# Patient Record
Sex: MALE | Race: WHITE | NOT HISPANIC OR LATINO | Employment: OTHER | URBAN - METROPOLITAN AREA
[De-identification: names, ages, dates, MRNs, and addresses within clinical notes are randomized per-mention and may not be internally consistent; named-entity substitution may affect disease eponyms.]

---

## 2019-04-05 ENCOUNTER — HOSPITAL ENCOUNTER (OUTPATIENT)
Dept: OTHER | Facility: HOSPITAL | Age: 74
Discharge: HOME | End: 2019-04-05
Attending: FAMILY MEDICINE | Admitting: FAMILY MEDICINE

## 2019-09-11 ENCOUNTER — OFFICE VISIT (OUTPATIENT)
Dept: FAMILY MEDICINE CLINIC | Facility: CLINIC | Age: 74
End: 2019-09-11

## 2019-09-11 VITALS
TEMPERATURE: 97.6 F | OXYGEN SATURATION: 99 % | BODY MASS INDEX: 27.8 KG/M2 | DIASTOLIC BLOOD PRESSURE: 86 MMHG | RESPIRATION RATE: 18 BRPM | HEART RATE: 55 BPM | SYSTOLIC BLOOD PRESSURE: 140 MMHG | WEIGHT: 183.4 LBS | HEIGHT: 68 IN

## 2019-09-11 DIAGNOSIS — S80.811A INFECTED ABRASION OF RIGHT LOWER EXTREMITY, INITIAL ENCOUNTER: Primary | ICD-10-CM

## 2019-09-11 DIAGNOSIS — L08.9 INFECTED ABRASION OF RIGHT LOWER EXTREMITY, INITIAL ENCOUNTER: Primary | ICD-10-CM

## 2019-09-11 PROCEDURE — 99213 OFFICE O/P EST LOW 20 MIN: CPT | Performed by: FAMILY MEDICINE

## 2019-09-11 RX ORDER — LORATADINE 10 MG/1
1 CAPSULE, LIQUID FILLED ORAL DAILY
COMMUNITY

## 2019-09-11 RX ORDER — METOPROLOL SUCCINATE 100 MG/1
TABLET, EXTENDED RELEASE ORAL DAILY
COMMUNITY
Start: 2019-05-29 | End: 2020-01-24 | Stop reason: SDUPTHER

## 2019-09-11 RX ORDER — METOPROLOL SUCCINATE 100 MG/1
TABLET, EXTENDED RELEASE ORAL
COMMUNITY
Start: 2019-08-21 | End: 2019-10-31 | Stop reason: SDUPTHER

## 2019-09-11 RX ORDER — MULTIVITAMIN WITH IRON
1 TABLET ORAL DAILY
COMMUNITY

## 2019-09-11 RX ORDER — PROCHLORPERAZINE MALEATE 10 MG
TABLET ORAL
Refills: 0 | Status: ON HOLD | COMMUNITY
Start: 2019-06-07 | End: 2021-10-01

## 2019-09-11 RX ORDER — LOSARTAN POTASSIUM AND HYDROCHLOROTHIAZIDE 12.5; 1 MG/1; MG/1
TABLET ORAL
COMMUNITY
Start: 2019-08-21 | End: 2020-03-26

## 2019-09-11 RX ORDER — ATORVASTATIN CALCIUM 20 MG/1
TABLET, FILM COATED ORAL DAILY
COMMUNITY
Start: 2019-05-29 | End: 2020-06-29

## 2019-09-11 RX ORDER — RANITIDINE 300 MG/1
TABLET ORAL DAILY
COMMUNITY
Start: 2019-05-29 | End: 2020-10-09

## 2019-09-11 RX ORDER — TERBINAFINE HYDROCHLORIDE 250 MG/1
250 TABLET ORAL DAILY
Refills: 1 | Status: ON HOLD | COMMUNITY
Start: 2019-09-03 | End: 2021-10-01

## 2019-09-11 RX ORDER — OMEPRAZOLE 20 MG/1
1 CAPSULE, DELAYED RELEASE ORAL DAILY
COMMUNITY
Start: 2019-05-29 | End: 2020-06-25

## 2019-09-11 NOTE — PROGRESS NOTES
Chief Complaint   Patient presents with   • Laceration     right shin       Subjective   Luis Emery is a 74 y.o. male.     Laceration    The incident occurred 5 to 7 days ago. The laceration is located on the right leg. The laceration is 3 cm in size. The laceration mechanism was a blunt object (Patient was wearing jeans when the object hit the leg. There was no damage to the jeans.). The pain is at a severity of 5/10. The pain is moderate. The pain has been fluctuating since onset. His tetanus status is UTD.          I have reviewed and updated his medications, medical history and problem list during today's office visit.     Past Medical History:   Diagnosis Date   • Anemia    • Chronic angle-closure glaucoma    • DNR (do not resuscitate)    • Essential hypertension    • Gastroesophageal reflux disease without esophagitis    • History of measles    • Hyperlipidemia    • Medicare annual wellness visit, subsequent    • Onychomycosis    • Overweight    • Personal history of malignant neoplasm of prostate    • Screening for depression     Negative Depression Screening (4 or less) ()   • Screening PSA (prostate specific antigen)        Past Surgical History:   Procedure Laterality Date   • ANKLE TENDON REPAIR  July 5th, 2017    Dr. Armijo   • CHOLECYSTECTOMY  03/2010    [facility].   • PROSTATECTOMY  01/1997    [facility].       Family History   Problem Relation Age of Onset   • Testicular cancer Father    • Coronary artery disease Father    • Hypertension Other        Current Outpatient Medications on File Prior to Visit   Medication Sig Dispense Refill   • atorvastatin (LIPITOR) 20 MG tablet Take  by mouth Daily.     • Loratadine (CLARITIN) 10 MG capsule Take 1 capsule by mouth Daily.     • losartan-hydrochlorothiazide (HYZAAR) 100-12.5 MG per tablet      • methylcellulose, Laxative, (CITRUCEL) 500 MG tablet tablet Take  by mouth Daily.     • metoprolol succinate XL (TOPROL-XL)  "100 MG 24 hr tablet Take  by mouth Daily.     • metoprolol succinate XL (TOPROL-XL) 100 MG 24 hr tablet      • Multiple Vitamin (MULTIVITAMINS PO) Take 1 capsule by mouth Daily.     • Omega-3 Fatty Acids (FISH OIL CONCENTRATE) 300 MG capsule Take 1 capsule by mouth Daily.     • omeprazole (priLOSEC) 20 MG capsule Take 1 capsule by mouth Daily.     • prochlorperazine (COMPAZINE) 10 MG tablet TAKE 1 2 TO 1 (ONE HALF TO ONE) TABLET BY MOUTH EVERY 4 TO 6 HOURS AS NEEDED FOR NAUSEA VOMITING  0   • raNITIdine (ZANTAC) 300 MG tablet Take  by mouth Daily.     • terbinafine (lamiSIL) 250 MG tablet Take 250 mg by mouth Daily.  1   • glucosamine-chondroitin 500-400 MG per tablet Take  by mouth Daily.       No current facility-administered medications on file prior to visit.        PHQ-2 Depression Screening  Little interest or pleasure in doing things? 0   Feeling down, depressed, or hopeless? 0   PHQ-2 Total Score 0         Social History     Tobacco Use   • Smoking status: Never Smoker   Substance Use Topics   • Alcohol use: No       Review of Systems   Constitutional: Negative for chills and fever.   Respiratory: Negative for shortness of breath.    Cardiovascular: Negative for chest pain and leg swelling.   Gastrointestinal: Negative for abdominal pain.   Genitourinary: Negative for difficulty urinating.   Musculoskeletal: Negative for arthralgias, gait problem and joint swelling.   Skin: Positive for skin lesions.   Neurological: Negative for weakness and numbness.       Objective   Vitals:    09/11/19 1733   BP: 140/86   BP Location: Left arm   Patient Position: Sitting   Cuff Size: Adult   Pulse: 55   Resp: 18   Temp: 97.6 °F (36.4 °C)   TempSrc: Oral   SpO2: 99%   Weight: 83.2 kg (183 lb 6.4 oz)   Height: 172.7 cm (68\")     Body mass index is 27.89 kg/m².  Physical Exam   Constitutional: He is oriented to person, place, and time. He appears well-developed and well-nourished. No distress.   HENT:   Head: Normocephalic " and atraumatic.   Mouth/Throat: Oropharynx is clear and moist.   Eyes: Conjunctivae and EOM are normal. Pupils are equal, round, and reactive to light.   Neck: Normal range of motion. Neck supple. No JVD present.   Cardiovascular: Normal rate, regular rhythm and normal heart sounds.   No murmur heard.  Pulmonary/Chest: Effort normal and breath sounds normal.   Musculoskeletal: Normal range of motion. He exhibits tenderness. He exhibits no edema.   Lymphadenopathy:     He has no cervical adenopathy.   Neurological: He is alert and oriented to person, place, and time. No cranial nerve deficit.   Skin: Skin is warm and dry. Capillary refill takes less than 2 seconds. No rash noted.        Psychiatric: He has a normal mood and affect. His behavior is normal. Thought content normal.       Assessment/Plan       Diagnoses and all orders for this visit:    1. Infected abrasion of right lower extremity, initial encounter (Primary)  -     silver sulfadiazine (SILVADENE, SSD) 1 % cream; Apply  topically to the appropriate area as directed 2 (Two) Times a Day.  Dispense: 50 g; Refill: 1      Wound care discussed.   Apply silvadene.  F/U next week if needed for recheck.    Return if symptoms worsen or fail to improve.

## 2019-09-12 PROBLEM — Z12.5 SCREENING PSA (PROSTATE SPECIFIC ANTIGEN): Status: ACTIVE | Noted: 2019-09-12

## 2019-09-12 PROBLEM — K21.9 GASTROESOPHAGEAL REFLUX DISEASE WITHOUT ESOPHAGITIS: Status: ACTIVE | Noted: 2019-09-12

## 2019-09-12 PROBLEM — D64.9 ANEMIA: Status: ACTIVE | Noted: 2019-09-12

## 2019-09-12 PROBLEM — R35.0 INCREASED FREQUENCY OF URINATION: Status: ACTIVE | Noted: 2019-09-12

## 2019-09-12 PROBLEM — B35.1 ONYCHOMYCOSIS: Status: ACTIVE | Noted: 2019-09-12

## 2019-09-12 PROBLEM — H40.2290 CHRONIC ANGLE-CLOSURE GLAUCOMA: Status: ACTIVE | Noted: 2019-09-12

## 2019-09-12 PROBLEM — Z86.19 HISTORY OF MEASLES: Status: ACTIVE | Noted: 2019-09-12

## 2019-09-12 PROBLEM — Z00.00 MEDICARE ANNUAL WELLNESS VISIT, SUBSEQUENT: Status: ACTIVE | Noted: 2019-09-12

## 2019-09-12 PROBLEM — Z23 INFLUENZA VACCINE ADMINISTERED: Status: ACTIVE | Noted: 2019-09-12

## 2019-09-12 PROBLEM — I10 ESSENTIAL HYPERTENSION: Status: ACTIVE | Noted: 2019-09-12

## 2019-09-12 PROBLEM — Z66 DNR (DO NOT RESUSCITATE): Status: ACTIVE | Noted: 2019-09-12

## 2019-09-12 PROBLEM — Z85.46 PERSONAL HISTORY OF PROSTATE CANCER: Status: ACTIVE | Noted: 2019-09-12

## 2019-09-12 PROBLEM — E78.5 HYPERLIPIDEMIA: Status: ACTIVE | Noted: 2019-09-12

## 2019-09-12 RX ORDER — LANOLIN ALCOHOL/MO/W.PET/CERES
1 CREAM (GRAM) TOPICAL DAILY
COMMUNITY

## 2019-09-18 ENCOUNTER — OFFICE VISIT (OUTPATIENT)
Dept: FAMILY MEDICINE CLINIC | Facility: CLINIC | Age: 74
End: 2019-09-18

## 2019-09-18 VITALS
TEMPERATURE: 98.8 F | WEIGHT: 183 LBS | DIASTOLIC BLOOD PRESSURE: 88 MMHG | HEIGHT: 68 IN | BODY MASS INDEX: 27.74 KG/M2 | OXYGEN SATURATION: 96 % | SYSTOLIC BLOOD PRESSURE: 136 MMHG | HEART RATE: 57 BPM

## 2019-09-18 DIAGNOSIS — S80.811D ABRASION OF RIGHT LOWER EXTREMITY, SUBSEQUENT ENCOUNTER: Primary | ICD-10-CM

## 2019-09-18 DIAGNOSIS — L03.115 CELLULITIS OF RIGHT LOWER EXTREMITY: ICD-10-CM

## 2019-09-18 DIAGNOSIS — I10 ESSENTIAL HYPERTENSION: ICD-10-CM

## 2019-09-18 PROCEDURE — 99213 OFFICE O/P EST LOW 20 MIN: CPT | Performed by: FAMILY MEDICINE

## 2019-09-18 PROCEDURE — 96372 THER/PROPH/DIAG INJ SC/IM: CPT | Performed by: FAMILY MEDICINE

## 2019-09-18 RX ORDER — SULFAMETHOXAZOLE AND TRIMETHOPRIM 800; 160 MG/1; MG/1
1 TABLET ORAL 2 TIMES DAILY
Qty: 20 TABLET | Refills: 0 | Status: SHIPPED | OUTPATIENT
Start: 2019-09-18 | End: 2019-09-28

## 2019-09-18 RX ORDER — CEFTRIAXONE 1 G/1
1 INJECTION, POWDER, FOR SOLUTION INTRAMUSCULAR; INTRAVENOUS EVERY 24 HOURS
Status: COMPLETED | OUTPATIENT
Start: 2019-09-18 | End: 2019-09-18

## 2019-09-18 RX ADMIN — CEFTRIAXONE 1 G: 1 INJECTION, POWDER, FOR SOLUTION INTRAMUSCULAR; INTRAVENOUS at 17:02

## 2019-09-18 NOTE — PROGRESS NOTES
"Chief Complaint   Patient presents with   • Leg Swelling       Subjective   Luis Emery is a 74 y.o. male.     Patient reports that abrasion on right leg is not getting better and it hurts on the inside.       Leg Swelling   This is a recurrent problem. The current episode started 1 to 4 weeks ago. The problem occurs constantly. The problem has been unchanged. Pertinent negatives include no abdominal pain, arthralgias, chest pain, chills, fever, joint swelling, numbness or weakness. The symptoms are aggravated by walking and standing. Treatments tried: silvadine. The treatment provided no relief.          I have reviewed and updated his medications, medical history and problem list during today's office visit.     Social History     Tobacco Use   • Smoking status: Never Smoker   Substance Use Topics   • Alcohol use: No       Review of Systems   Constitutional: Negative for chills and fever.   Respiratory: Negative for shortness of breath.    Cardiovascular: Negative for chest pain and leg swelling.   Gastrointestinal: Negative for abdominal pain.   Genitourinary: Negative for difficulty urinating.   Musculoskeletal: Negative for arthralgias, gait problem and joint swelling.   Skin: Positive for skin lesions.   Neurological: Negative for weakness and numbness.       Objective   Vitals:    09/18/19 1623   BP: 136/88   Pulse: 57   Temp: 98.8 °F (37.1 °C)   TempSrc: Oral   SpO2: 96%   Weight: 83 kg (183 lb)   Height: 172.7 cm (68\")     Body mass index is 27.83 kg/m².  Physical Exam   Constitutional: He is oriented to person, place, and time. He appears well-developed and well-nourished. No distress.   HENT:   Head: Normocephalic and atraumatic.   Mouth/Throat: Oropharynx is clear and moist.   Eyes: Conjunctivae are normal.   Cardiovascular: Normal rate, regular rhythm and normal heart sounds.   No murmur heard.  Pulmonary/Chest: Effort normal and breath sounds normal.   Musculoskeletal: He exhibits no edema. "   Neurological: He is alert and oriented to person, place, and time.   Skin: Skin is warm. Capillary refill takes less than 2 seconds. Turgor is normal. Abrasion noted. No bruising and no ecchymosis noted. There is erythema.        Psychiatric: He has a normal mood and affect.       Assessment/Plan       Diagnoses and all orders for this visit:    1. Abrasion of right lower extremity, subsequent encounter (Primary)    2. Cellulitis of right lower extremity  -     cefTRIAXone (ROCEPHIN) injection 1 g  -     sulfamethoxazole-trimethoprim (BACTRIM DS,SEPTRA DS) 800-160 MG per tablet; Take 1 tablet by mouth 2 (Two) Times a Day for 10 days.  Dispense: 20 tablet; Refill: 0  -     mupirocin (BACTROBAN) 2 % ointment; Apply  topically to the appropriate area as directed 3 (Three) Times a Day.  Dispense: 22 g; Refill: 0    3. Essential hypertension  Comments:  Monitor blood pressure.          Return if symptoms worsen or fail to improve.

## 2019-10-18 ENCOUNTER — OFFICE VISIT (OUTPATIENT)
Dept: FAMILY MEDICINE CLINIC | Facility: CLINIC | Age: 74
End: 2019-10-18

## 2019-10-18 VITALS
BODY MASS INDEX: 28.01 KG/M2 | RESPIRATION RATE: 18 BRPM | HEIGHT: 68 IN | OXYGEN SATURATION: 99 % | WEIGHT: 184.8 LBS | DIASTOLIC BLOOD PRESSURE: 74 MMHG | SYSTOLIC BLOOD PRESSURE: 116 MMHG | TEMPERATURE: 97.1 F | HEART RATE: 56 BPM

## 2019-10-18 DIAGNOSIS — S80.811D ABRASION OF RIGHT LOWER EXTREMITY, SUBSEQUENT ENCOUNTER: Primary | ICD-10-CM

## 2019-10-18 PROCEDURE — 99212 OFFICE O/P EST SF 10 MIN: CPT | Performed by: FAMILY MEDICINE

## 2019-10-18 RX ORDER — INFLUENZA A VIRUS A/MICHIGAN/45/2015 X-275 (H1N1) ANTIGEN (FORMALDEHYDE INACTIVATED), INFLUENZA A VIRUS A/SINGAPORE/INFIMH-16-0019/2016 IVR-186 (H3N2) ANTIGEN (FORMALDEHYDE INACTIVATED), AND INFLUENZA B VIRUS B/MARYLAND/15/2016 BX-69A (A B/COLORADO/6/2017-LIKE VIRUS) ANTIGEN (FORMALDEHYDE INACTIVATED) 60; 60; 60 UG/.5ML; UG/.5ML; UG/.5ML
INJECTION, SUSPENSION INTRAMUSCULAR
Refills: 0 | COMMUNITY
Start: 2019-10-10 | End: 2019-10-31

## 2019-10-18 NOTE — PROGRESS NOTES
Chief Complaint   Patient presents with   • Follow-up     Abrasion of right lower extremity       Subjective   Luis Emery is a 74 y.o. male.     Abrasion   The current episode started more than 1 month ago. The problem occurs constantly. The problem has been gradually improving. Pertinent negatives include no abdominal pain, arthralgias, chest pain, chills, fever, joint swelling, numbness or weakness. Treatments tried: antibiotics, topical tripple anitbiotics, Derm banadage treatment put on yesterday by Dr. Bernstein. The treatment provided mild relief.        I have reviewed and updated his medications, medical history and problem list during today's office visit.     Active Ambulatory Problems     Diagnosis Date Noted   • Personal history of prostate cancer 09/12/2019   • Onychomycosis 09/12/2019   • Influenza vaccine administered 09/12/2019   • Increased frequency of urination 09/12/2019   • Hyperlipidemia 09/12/2019   • History of measles 09/12/2019   • Gastroesophageal reflux disease without esophagitis 09/12/2019   • Essential hypertension 09/12/2019   • Screening PSA (prostate specific antigen) 09/12/2019   • Medicare annual wellness visit, subsequent 09/12/2019   • DNR (do not resuscitate) 09/12/2019   • Chronic angle-closure glaucoma 09/12/2019   • Anemia 09/12/2019     Resolved Ambulatory Problems     Diagnosis Date Noted   • No Resolved Ambulatory Problems     Past Medical History:   Diagnosis Date   • Abdominal pain    • Acute recurrent frontal sinusitis    • Acute upper respiratory infection    • Anemia    • Chronic angle-closure glaucoma    • DNR (do not resuscitate)    • Essential hypertension    • Gastroesophageal reflux disease without esophagitis    • History of measles    • Hyperlipidemia    • Medicare annual wellness visit, subsequent    • Onychomycosis    • Overweight    • Personal history of malignant neoplasm of prostate    • Screening for depression    • Screening PSA (prostate specific  antigen)    • Urinary frequency      Current Outpatient Medications on File Prior to Visit   Medication Sig Dispense Refill   • atorvastatin (LIPITOR) 20 MG tablet Take  by mouth Daily.     • glucosamine-chondroitin 500-400 MG per tablet Take  by mouth Daily.     • Loratadine (CLARITIN) 10 MG capsule Take 1 capsule by mouth Daily.     • losartan-hydrochlorothiazide (HYZAAR) 100-12.5 MG per tablet      • methylcellulose, Laxative, (CITRUCEL) 500 MG tablet tablet Take  by mouth Daily.     • metoprolol succinate XL (TOPROL-XL) 100 MG 24 hr tablet Take  by mouth Daily.     • metoprolol succinate XL (TOPROL-XL) 100 MG 24 hr tablet      • Multiple Vitamin (MULTIVITAMINS PO) Take 1 capsule by mouth Daily.     • mupirocin (BACTROBAN) 2 % ointment Apply  topically to the appropriate area as directed 3 (Three) Times a Day. 22 g 0   • Omega-3 Fatty Acids (FISH OIL CONCENTRATE) 300 MG capsule Take 1 capsule by mouth Daily.     • omeprazole (priLOSEC) 20 MG capsule Take 1 capsule by mouth Daily.     • prochlorperazine (COMPAZINE) 10 MG tablet TAKE 1 2 TO 1 (ONE HALF TO ONE) TABLET BY MOUTH EVERY 4 TO 6 HOURS AS NEEDED FOR NAUSEA VOMITING  0   • raNITIdine (ZANTAC) 300 MG tablet Take  by mouth Daily.     • terbinafine (lamiSIL) 250 MG tablet Take 250 mg by mouth Daily.  1   • FLUZONE HIGH-DOSE 0.5 ML suspension prefilled syringe injection TO BE ADMINISTERED BY PHARMACIST FOR IMMUNIZATION  0     No current facility-administered medications on file prior to visit.          Social History     Tobacco Use   • Smoking status: Never Smoker   Substance Use Topics   • Alcohol use: No       Review of Systems   Constitutional: Negative for chills and fever.   Respiratory: Negative for shortness of breath.    Cardiovascular: Negative for chest pain and leg swelling.   Gastrointestinal: Negative for abdominal pain.   Genitourinary: Negative for difficulty urinating.   Musculoskeletal: Negative for arthralgias, gait problem and joint  "swelling.   Skin: Positive for skin lesions.   Neurological: Negative for weakness and numbness.       Objective   Vitals:    10/18/19 1319   BP: 116/74   BP Location: Left arm   Patient Position: Sitting   Cuff Size: Adult   Pulse: 56   Resp: 18   Temp: 97.1 °F (36.2 °C)   TempSrc: Oral   SpO2: 99%   Weight: 83.8 kg (184 lb 12.8 oz)   Height: 172.7 cm (68\")     Body mass index is 28.1 kg/m².  Physical Exam   Constitutional: He is oriented to person, place, and time. He appears well-developed and well-nourished. No distress.   HENT:   Head: Normocephalic and atraumatic.   Mouth/Throat: Oropharynx is clear and moist.   Eyes: Conjunctivae are normal.   Cardiovascular: Normal rate, regular rhythm and normal heart sounds.   No murmur heard.  Pulmonary/Chest: Effort normal and breath sounds normal.   Musculoskeletal: He exhibits no edema.   Neurological: He is alert and oriented to person, place, and time.   Skin: Skin is warm. Capillary refill takes less than 2 seconds. Turgor is normal. Abrasion noted. No bruising and no ecchymosis noted. There is erythema.        Psychiatric: He has a normal mood and affect.           Assessment/Plan       Diagnoses and all orders for this visit:    1. Abrasion of right lower extremity, subsequent encounter (Primary)  Comments:  Now being managed by Dr. Bernstein with a Duoderm, placed yesterday.  They will monitor site and f/u as needed.      Return if symptoms worsen or fail to improve.        "

## 2019-10-31 ENCOUNTER — OFFICE VISIT (OUTPATIENT)
Dept: FAMILY MEDICINE CLINIC | Facility: CLINIC | Age: 74
End: 2019-10-31

## 2019-10-31 VITALS
DIASTOLIC BLOOD PRESSURE: 84 MMHG | WEIGHT: 182.4 LBS | TEMPERATURE: 96.2 F | SYSTOLIC BLOOD PRESSURE: 128 MMHG | BODY MASS INDEX: 27.65 KG/M2 | HEART RATE: 53 BPM | RESPIRATION RATE: 18 BRPM | OXYGEN SATURATION: 99 % | HEIGHT: 68 IN

## 2019-10-31 DIAGNOSIS — E78.5 HYPERLIPIDEMIA, UNSPECIFIED HYPERLIPIDEMIA TYPE: ICD-10-CM

## 2019-10-31 DIAGNOSIS — Z00.00 ENCOUNTER FOR MEDICARE ANNUAL WELLNESS EXAM: Primary | ICD-10-CM

## 2019-10-31 DIAGNOSIS — Z66 DNR (DO NOT RESUSCITATE): ICD-10-CM

## 2019-10-31 DIAGNOSIS — I10 ESSENTIAL HYPERTENSION: ICD-10-CM

## 2019-10-31 DIAGNOSIS — S80.811D ABRASION OF RIGHT LOWER EXTREMITY, SUBSEQUENT ENCOUNTER: ICD-10-CM

## 2019-10-31 LAB
BILIRUB BLD-MCNC: NEGATIVE MG/DL
CLARITY, POC: CLEAR
COLOR UR: YELLOW
GLUCOSE UR STRIP-MCNC: NEGATIVE MG/DL
KETONES UR QL: NEGATIVE
LEUKOCYTE EST, POC: NEGATIVE
NITRITE UR-MCNC: NEGATIVE MG/ML
PH UR: 6 [PH] (ref 5–8)
PROT UR STRIP-MCNC: NEGATIVE MG/DL
RBC # UR STRIP: NEGATIVE /UL
SP GR UR: 1 (ref 1–1.03)
UROBILINOGEN UR QL: NORMAL

## 2019-10-31 PROCEDURE — G0439 PPPS, SUBSEQ VISIT: HCPCS | Performed by: FAMILY MEDICINE

## 2019-10-31 PROCEDURE — 36415 COLL VENOUS BLD VENIPUNCTURE: CPT | Performed by: FAMILY MEDICINE

## 2019-10-31 PROCEDURE — 81002 URINALYSIS NONAUTO W/O SCOPE: CPT | Performed by: FAMILY MEDICINE

## 2019-10-31 PROCEDURE — 99214 OFFICE O/P EST MOD 30 MIN: CPT | Performed by: FAMILY MEDICINE

## 2019-10-31 RX ORDER — SULFAMETHOXAZOLE AND TRIMETHOPRIM 800; 160 MG/1; MG/1
1 TABLET ORAL 2 TIMES DAILY
Qty: 20 TABLET | Refills: 0 | Status: SHIPPED | OUTPATIENT
Start: 2019-10-31 | End: 2019-11-10

## 2019-10-31 NOTE — PROGRESS NOTES
The ABCs of the Annual Wellness Visit  Subsequent Medicare Wellness Visit    Chief Complaint   Patient presents with   • Annual Exam     Medicare subsequent wellness   • Wound Check   • Hypertension   • Hyperlipidemia       Subjective   History of Present Illness:  Luis Emery is a 74 y.o. male who presents for a Subsequent Medicare Wellness Visit.    Hypertension   This is a chronic problem. The current episode started more than 1 year ago. The problem is controlled. Pertinent negatives include no chest pain, headaches, palpitations or shortness of breath. Current antihypertension treatment includes angiotensin blockers, diuretics and beta blockers. There are no compliance problems.  There is no history of angina.   Hyperlipidemia   This is a chronic problem. The current episode started more than 1 year ago. The problem is controlled. Pertinent negatives include no chest pain, myalgias or shortness of breath. Current antihyperlipidemic treatment includes statins and diet change. There are no compliance problems.  Risk factors for coronary artery disease include dyslipidemia, hypertension and male sex.     Wound Check   Patient reports that abrasion on right leg is not healing and getting worse.  Treatments have included wound care, silvadene, a rocephin shot, mupirocin, bactrim DS and a duoderm (ordered by dermatology).  Most recently, dermatology has recommended a diluted vinegar water solution.  Wound has been present for about 2 months.  Pertinent negatives include no abdominal pain, arthralgias, chest pain, chills, fever, joint swelling, numbness or weakness. The symptoms are aggravated by walking and standing.       HEALTH RISK ASSESSMENT    Recent Hospitalizations:  Recently treated at the following:  Other: [place], [place]    Current Medical Providers:  Patient Care Team:  Selena White MD as PCP - General  Luz Bernstein MD as PCP - Claims Attributed  Cindy  Selena Saldana MD as PCP - Family Medicine    Smoking Status:  Social History     Tobacco Use   Smoking Status Never Smoker       Alcohol Consumption:  Social History     Substance and Sexual Activity   Alcohol Use No       Depression Screen:   PHQ-2/PHQ-9 Depression Screening 10/31/2019   Little interest or pleasure in doing things 0   Feeling down, depressed, or hopeless 0   Total Score 0       Fall Risk Screen:  SHAYLA Fall Risk Assessment was completed, and patient is at LOW risk for falls.Assessment completed on:10/31/2019    Health Habits and Functional and Cognitive Screening:  Functional & Cognitive Status 10/31/2019   Do you have difficulty preparing food and eating? No   Do you have difficulty bathing yourself, getting dressed or grooming yourself? No   Do you have difficulty using the toilet? No   Do you have difficulty moving around from place to place? No   Do you have trouble with steps or getting out of a bed or a chair? No   Current Diet Well Balanced Diet   Dental Exam Up to date   Eye Exam Up to date   Exercise (times per week) 2 times per week   Current Exercise Activities Include Stationary Bicycling/Spin Class   Do you need help using the phone?  No   Are you deaf or do you have serious difficulty hearing?  No   Do you need help with transportation? No   Do you need help shopping? No   Do you need help preparing meals?  No   Do you need help with housework?  No   Do you need help with laundry? No   Do you need help taking your medications? No   Do you need help managing money? No   Do you ever drive or ride in a car without wearing a seat belt? No   Have you felt unusual stress, anger or loneliness in the last month? No   Who do you live with? Spouse   If you need help, do you have trouble finding someone available to you? No   Have you been bothered in the last four weeks by sexual problems? No   Do you have difficulty concentrating, remembering or making decisions? No         Does the  patient have evidence of cognitive impairment? No  Score 30/30    Asprin use counseling:Does not need ASA (and currently is not on it)    Age-appropriate Screening Schedule:  Refer to the list below for future screening recommendations based on patient's age, sex and/or medical conditions. Orders for these recommended tests are listed in the plan section. The patient has been provided with a written plan.    Health Maintenance   Topic Date Due   • ZOSTER VACCINE (1 of 2) 07/07/1993   • LIPID PANEL  09/13/2019   • TDAP/TD VACCINES (2 - Td) 04/18/2028   • INFLUENZA VACCINE  Completed   • PNEUMOCOCCAL VACCINES (65+ LOW/MEDIUM RISK)  Completed          The following portions of the patient's history were reviewed and updated as appropriate: allergies, current medications, past family history, past medical history, past social history, past surgical history and problem list.    Outpatient Medications Prior to Visit   Medication Sig Dispense Refill   • atorvastatin (LIPITOR) 20 MG tablet Take  by mouth Daily.     • glucosamine-chondroitin 500-400 MG per tablet Take  by mouth Daily.     • Loratadine (CLARITIN) 10 MG capsule Take 1 capsule by mouth Daily.     • losartan-hydrochlorothiazide (HYZAAR) 100-12.5 MG per tablet      • methylcellulose, Laxative, (CITRUCEL) 500 MG tablet tablet Take  by mouth Daily.     • metoprolol succinate XL (TOPROL-XL) 100 MG 24 hr tablet Take  by mouth Daily.     • Multiple Vitamin (MULTIVITAMINS PO) Take 1 capsule by mouth Daily.     • mupirocin (BACTROBAN) 2 % ointment Apply  topically to the appropriate area as directed 3 (Three) Times a Day. 22 g 0   • Omega-3 Fatty Acids (FISH OIL CONCENTRATE) 300 MG capsule Take 1 capsule by mouth Daily.     • omeprazole (priLOSEC) 20 MG capsule Take 1 capsule by mouth Daily.     • prochlorperazine (COMPAZINE) 10 MG tablet TAKE 1 2 TO 1 (ONE HALF TO ONE) TABLET BY MOUTH EVERY 4 TO 6 HOURS AS NEEDED FOR NAUSEA VOMITING  0   • raNITIdine (ZANTAC) 300 MG  tablet Take  by mouth Daily.     • terbinafine (lamiSIL) 250 MG tablet Take 250 mg by mouth Daily.  1   • FLUZONE HIGH-DOSE 0.5 ML suspension prefilled syringe injection TO BE ADMINISTERED BY PHARMACIST FOR IMMUNIZATION  0   • metoprolol succinate XL (TOPROL-XL) 100 MG 24 hr tablet        No facility-administered medications prior to visit.        Patient Active Problem List   Diagnosis   • Personal history of prostate cancer   • Onychomycosis   • Influenza vaccine administered   • Increased frequency of urination   • Hyperlipidemia   • History of measles   • Gastroesophageal reflux disease without esophagitis   • Essential hypertension   • Screening PSA (prostate specific antigen)   • Medicare annual wellness visit, subsequent   • DNR (do not resuscitate)   • Chronic angle-closure glaucoma   • Anemia       Advanced Care Planning:  Patient has an advance directive - a copy has not been provided. Have asked the patient to send this to us to add to record  Wife Marie is his medical decision maker.  He is a DNR.  No feeding tube.    Last discussion 9/2018.  There has been no change in his decision.    Review of Systems   Constitutional: Negative for activity change, appetite change, fatigue and fever.   HENT: Positive for hearing loss (chronic, stable). Negative for dental problem, ear pain, sore throat and trouble swallowing.    Eyes: Negative for pain, redness and visual disturbance.   Respiratory: Negative for cough and shortness of breath.    Cardiovascular: Negative for chest pain, palpitations and leg swelling.   Gastrointestinal: Negative for abdominal pain, constipation, diarrhea and vomiting.   Endocrine: Negative for polydipsia and polyuria.   Genitourinary: Negative for decreased urine volume, difficulty urinating, dysuria, hematuria, scrotal swelling and testicular pain.   Musculoskeletal: Negative for gait problem, joint swelling, myalgias and neck stiffness.   Skin: Positive for color change and wound.  "  Neurological: Negative for dizziness, tremors, seizures, syncope, weakness, numbness and headaches.   Hematological: Negative for adenopathy.   Psychiatric/Behavioral: Negative for agitation, behavioral problems and sleep disturbance.       Compared to one year ago, the patient feels his physical health is worse.  Compared to one year ago, the patient feels his mental health is the same.    Reviewed chart for potential of high risk medication in the elderly: yes  Reviewed chart for potential of harmful drug interactions in the elderly:yes    Objective         Vitals:    10/31/19 0813   BP: 128/84   BP Location: Left arm   Patient Position: Sitting   Cuff Size: Adult   Pulse: 53   Resp: 18   Temp: 96.2 °F (35.7 °C)   TempSrc: Oral   SpO2: 99%   Weight: 82.7 kg (182 lb 6.4 oz)   Height: 172.7 cm (68\")       Body mass index is 27.73 kg/m².  Discussed the patient's BMI with him. The BMI is in the acceptable range.    Physical Exam   Constitutional: He is oriented to person, place, and time. He appears well-developed and well-nourished. No distress.   HENT:   Head: Normocephalic and atraumatic.   Right Ear: External ear normal.   Left Ear: External ear normal.   Mouth/Throat: Oropharynx is clear and moist.   Eyes: Conjunctivae and EOM are normal. Pupils are equal, round, and reactive to light. No scleral icterus.   Neck: Normal range of motion. Neck supple. No JVD present.   Cardiovascular: Normal rate, regular rhythm and intact distal pulses.   Murmur (right sternal border) heard.   Crescendo decrescendo systolic murmur is present with a grade of 3/6.  Pulmonary/Chest: Effort normal and breath sounds normal. He has no wheezes.   Abdominal: Soft. Bowel sounds are normal. There is no tenderness. There is no rebound and no guarding.   Musculoskeletal: Normal range of motion. He exhibits no edema.   Lymphadenopathy:     He has no cervical adenopathy.   Neurological: He is alert and oriented to person, place, and time. He " displays normal reflexes. He exhibits normal muscle tone.   Skin: Skin is warm. Capillary refill takes less than 2 seconds. No rash noted. No erythema.        Psychiatric: He has a normal mood and affect. His behavior is normal. Judgment and thought content normal.             Assessment/Plan   Medicare Risks and Personalized Health Plan  CMS Preventative Services Quick Reference  Advance Directive Discussion  Colon Cancer Screening  Dementia/Memory   Depression/Dysphoria   Diabetic Lab Screening - fasting labs done  Fall Risk  Hearing Problem - chronic, stable; uses hearing aids  Immunizations Discussed/Encouraged (specific immunizations=Shingrix, can get through pharmacy; influenza and pneumonia vaccinations reviewed with patient today and current )  Prostate Cancer Screening - no longer needed as no prostate gland.  Last 2 PSA levels <0.01    The above risks/problems have been discussed with the patient.  Pertinent information has been shared with the patient in the After Visit Summary.  Follow up plans and orders are seen below in the Assessment/Plan Section.    Diagnoses and all orders for this visit:    1. Encounter for Medicare annual wellness exam (Primary)    2. DNR (do not resuscitate)  Comments:  He was asked to provide copy of forms to the office to upload into the system.    3. Hyperlipidemia, unspecified hyperlipidemia type  Comments:  Fasting labs drawn.  Orders:  -     Lipid Panel    4. Essential hypertension  Comments:  Stable.  Orders:  -     POCT urinalysis dipstick, manual  -     CBC & Differential  -     Comprehensive Metabolic Panel  -     CBC Auto Differential    5. Abrasion of right lower extremity, subsequent encounter  Comments:  Continue vinegar water.  Will prescribe another trial of bactrim DS and plan for would care consultation if not improved in 1- 2 weeks.  Orders:  -     sulfamethoxazole-trimethoprim (BACTRIM DS) 800-160 MG per tablet; Take 1 tablet by mouth 2 (Two) Times a Day for  10 days.  Dispense: 20 tablet; Refill: 0      Follow Up:  Return if symptoms worsen or fail to improve.     An After Visit Summary and PPPS were given to the patient.

## 2019-10-31 NOTE — PROGRESS NOTES
Site care done- cleaned with alcohol swab, procedure tolerated well, dressing applied. Venipuncture was obtained after 1 time(s). 2 tubes were drawn. Needle lisa used was 23-butterfly.

## 2019-11-01 LAB
ALBUMIN SERPL-MCNC: 4.5 G/DL (ref 3.5–4.8)
ALBUMIN/GLOB SERPL: 1.6 {RATIO} (ref 1.2–2.2)
ALP SERPL-CCNC: 60 IU/L (ref 39–117)
ALT SERPL-CCNC: 15 IU/L (ref 0–44)
AST SERPL-CCNC: 24 IU/L (ref 0–40)
BASOPHILS # BLD AUTO: 0 X10E3/UL (ref 0–0.2)
BASOPHILS NFR BLD AUTO: 1 %
BILIRUB SERPL-MCNC: 1.1 MG/DL (ref 0–1.2)
BUN SERPL-MCNC: 17 MG/DL (ref 8–27)
BUN/CREAT SERPL: 13 (ref 10–24)
CALCIUM SERPL-MCNC: 9.7 MG/DL (ref 8.6–10.2)
CHLORIDE SERPL-SCNC: 99 MMOL/L (ref 96–106)
CHOLEST SERPL-MCNC: 131 MG/DL (ref 100–199)
CO2 SERPL-SCNC: 26 MMOL/L (ref 20–29)
CREAT SERPL-MCNC: 1.27 MG/DL (ref 0.76–1.27)
EOSINOPHIL # BLD AUTO: 0.4 X10E3/UL (ref 0–0.4)
EOSINOPHIL NFR BLD AUTO: 6 %
ERYTHROCYTE [DISTWIDTH] IN BLOOD BY AUTOMATED COUNT: 13.9 % (ref 12.3–15.4)
GLOBULIN SER CALC-MCNC: 2.8 G/DL (ref 1.5–4.5)
GLUCOSE SERPL-MCNC: 102 MG/DL (ref 65–99)
HCT VFR BLD AUTO: 41.5 % (ref 37.5–51)
HDLC SERPL-MCNC: 45 MG/DL
HGB BLD-MCNC: 14.4 G/DL (ref 13–17.7)
IMM GRANULOCYTES # BLD AUTO: 0 X10E3/UL (ref 0–0.1)
IMM GRANULOCYTES NFR BLD AUTO: 0 %
LDLC SERPL CALC-MCNC: 69 MG/DL (ref 0–99)
LYMPHOCYTES # BLD AUTO: 1.5 X10E3/UL (ref 0.7–3.1)
LYMPHOCYTES NFR BLD AUTO: 22 %
MCH RBC QN AUTO: 32 PG (ref 26.6–33)
MCHC RBC AUTO-ENTMCNC: 34.7 G/DL (ref 31.5–35.7)
MCV RBC AUTO: 92 FL (ref 79–97)
MONOCYTES # BLD AUTO: 0.7 X10E3/UL (ref 0.1–0.9)
MONOCYTES NFR BLD AUTO: 10 %
NEUTROPHILS # BLD AUTO: 4.2 X10E3/UL (ref 1.4–7)
NEUTROPHILS NFR BLD AUTO: 61 %
PLATELET # BLD AUTO: 223 X10E3/UL (ref 150–450)
POTASSIUM SERPL-SCNC: 4.8 MMOL/L (ref 3.5–5.2)
PROT SERPL-MCNC: 7.3 G/DL (ref 6–8.5)
RBC # BLD AUTO: 4.5 X10E6/UL (ref 4.14–5.8)
SODIUM SERPL-SCNC: 138 MMOL/L (ref 134–144)
TRIGL SERPL-MCNC: 87 MG/DL (ref 0–149)
VLDLC SERPL CALC-MCNC: 17 MG/DL (ref 5–40)
WBC # BLD AUTO: 6.9 X10E3/UL (ref 3.4–10.8)

## 2019-11-11 ENCOUNTER — TELEPHONE (OUTPATIENT)
Dept: FAMILY MEDICINE CLINIC | Facility: CLINIC | Age: 74
End: 2019-11-11

## 2019-11-11 NOTE — TELEPHONE ENCOUNTER
Pt requests referral to wound care for right shin. Pt states he was told to call and we would schedule

## 2019-11-12 DIAGNOSIS — S80.811D ABRASION OF RIGHT LOWER EXTREMITY, SUBSEQUENT ENCOUNTER: Primary | ICD-10-CM

## 2019-11-12 NOTE — TELEPHONE ENCOUNTER
Ambulatory referral to [facility] ordered and routed to Wound Care.  They will contact the patient to schedule.

## 2019-11-13 ENCOUNTER — TELEPHONE (OUTPATIENT)
Dept: FAMILY MEDICINE CLINIC | Facility: CLINIC | Age: 74
End: 2019-11-13

## 2019-11-14 ENCOUNTER — TELEPHONE (OUTPATIENT)
Dept: FAMILY MEDICINE CLINIC | Facility: CLINIC | Age: 74
End: 2019-11-14

## 2019-11-14 NOTE — TELEPHONE ENCOUNTER
Patient said never mind on the previous message--they called him and he made appt with the specialists

## 2019-11-14 NOTE — TELEPHONE ENCOUNTER
He is calling about the spot on his leg. He stated that it is not healed and we were to send him to wound care. He stated that it has been about 10 weeks.

## 2019-11-18 ENCOUNTER — OFFICE VISIT (OUTPATIENT)
Dept: WOUND CARE | Facility: HOSPITAL | Age: 74
End: 2019-11-18

## 2019-11-18 PROCEDURE — G0463 HOSPITAL OUTPT CLINIC VISIT: HCPCS

## 2019-11-25 ENCOUNTER — OFFICE VISIT (OUTPATIENT)
Dept: WOUND CARE | Facility: HOSPITAL | Age: 74
End: 2019-11-25

## 2019-11-25 PROCEDURE — G0463 HOSPITAL OUTPT CLINIC VISIT: HCPCS

## 2019-11-27 NOTE — TELEPHONE ENCOUNTER
Patient leg is doing better spot is getting smaller  Gave silver gel patient is set to go back in two weeks for a recheck but leg is better

## 2019-12-09 ENCOUNTER — OFFICE VISIT (OUTPATIENT)
Dept: WOUND CARE | Facility: HOSPITAL | Age: 74
End: 2019-12-09

## 2019-12-09 PROCEDURE — G0463 HOSPITAL OUTPT CLINIC VISIT: HCPCS

## 2019-12-23 ENCOUNTER — OFFICE VISIT (OUTPATIENT)
Dept: WOUND CARE | Facility: HOSPITAL | Age: 74
End: 2019-12-23

## 2019-12-23 PROCEDURE — G0463 HOSPITAL OUTPT CLINIC VISIT: HCPCS

## 2020-01-06 ENCOUNTER — OFFICE VISIT (OUTPATIENT)
Dept: WOUND CARE | Facility: HOSPITAL | Age: 75
End: 2020-01-06

## 2020-01-06 PROCEDURE — G0463 HOSPITAL OUTPT CLINIC VISIT: HCPCS

## 2020-01-20 ENCOUNTER — OFFICE VISIT (OUTPATIENT)
Dept: WOUND CARE | Facility: HOSPITAL | Age: 75
End: 2020-01-20

## 2020-01-20 PROCEDURE — G0463 HOSPITAL OUTPT CLINIC VISIT: HCPCS

## 2020-01-24 ENCOUNTER — OFFICE VISIT (OUTPATIENT)
Dept: FAMILY MEDICINE CLINIC | Facility: CLINIC | Age: 75
End: 2020-01-24

## 2020-01-24 VITALS
TEMPERATURE: 97.8 F | SYSTOLIC BLOOD PRESSURE: 128 MMHG | OXYGEN SATURATION: 96 % | DIASTOLIC BLOOD PRESSURE: 70 MMHG | BODY MASS INDEX: 27.83 KG/M2 | WEIGHT: 183.6 LBS | HEART RATE: 56 BPM | HEIGHT: 68 IN

## 2020-01-24 DIAGNOSIS — I10 ESSENTIAL HYPERTENSION: Primary | ICD-10-CM

## 2020-01-24 DIAGNOSIS — R00.1 BRADYCARDIA: ICD-10-CM

## 2020-01-24 DIAGNOSIS — E78.5 HYPERLIPIDEMIA, UNSPECIFIED HYPERLIPIDEMIA TYPE: ICD-10-CM

## 2020-01-24 DIAGNOSIS — R01.1 HEART MURMUR: ICD-10-CM

## 2020-01-24 DIAGNOSIS — R06.02 SHORT OF BREATH ON EXERTION: ICD-10-CM

## 2020-01-24 PROCEDURE — 93000 ELECTROCARDIOGRAM COMPLETE: CPT | Performed by: FAMILY MEDICINE

## 2020-01-24 PROCEDURE — 99214 OFFICE O/P EST MOD 30 MIN: CPT | Performed by: FAMILY MEDICINE

## 2020-01-24 RX ORDER — METOPROLOL SUCCINATE 100 MG/1
50 TABLET, EXTENDED RELEASE ORAL DAILY
Qty: 90 TABLET | Refills: 0
Start: 2020-01-24 | End: 2020-09-30

## 2020-01-24 RX ORDER — HYDROCHLOROTHIAZIDE 12.5 MG/1
12.5 TABLET ORAL DAILY
Qty: 90 TABLET | Refills: 1 | Status: SHIPPED | OUTPATIENT
Start: 2020-01-24 | End: 2020-03-26 | Stop reason: SDUPTHER

## 2020-01-24 NOTE — PROGRESS NOTES
Chief Complaint   Patient presents with   • Hypertension   • Med Management       Subjective   Luis Emery is a 74 y.o. male.     Hypertension   This is a chronic problem. The current episode started more than 1 year ago. The problem is unchanged. The problem is controlled. Associated symptoms include shortness of breath (with exertion). Pertinent negatives include no blurred vision, chest pain, headaches or palpitations. (Patient dose report dizzy spells.  He states he has had two in the last 6 months. ) Risk factors for coronary artery disease include dyslipidemia and male gender. Past treatments include nothing. Current antihypertension treatment includes beta blockers, angiotensin blockers and diuretics. The current treatment provides significant improvement. There are no compliance problems.       He is concerned today about his BP being elevated (152/81) at [facility] and his heart rate has been in the 40s.    I have reviewed and updated his medications, medical history and problem list during today's office visit.       Past Medical History :  Active Ambulatory Problems     Diagnosis Date Noted   • Personal history of prostate cancer 09/12/2019   • Onychomycosis 09/12/2019   • Influenza vaccine administered 09/12/2019   • Increased frequency of urination 09/12/2019   • Hyperlipidemia 09/12/2019   • History of measles 09/12/2019   • Gastroesophageal reflux disease without esophagitis 09/12/2019   • Essential hypertension 09/12/2019   • Screening PSA (prostate specific antigen) 09/12/2019   • Medicare annual wellness visit, subsequent 09/12/2019   • DNR (do not resuscitate) 09/12/2019   • Chronic angle-closure glaucoma 09/12/2019   • Anemia 09/12/2019     Resolved Ambulatory Problems     Diagnosis Date Noted   • No Resolved Ambulatory Problems     Past Medical History:   Diagnosis Date   • Abdominal pain    • Acute recurrent frontal sinusitis    • Acute upper respiratory infection    • Overweight    •  Personal history of malignant neoplasm of prostate    • Screening for depression    • Urinary frequency        Medication List:    Current Outpatient Medications:   •  atorvastatin (LIPITOR) 20 MG tablet, Take  by mouth Daily., Disp: , Rfl:   •  glucosamine-chondroitin 500-400 MG per tablet, Take  by mouth Daily., Disp: , Rfl:   •  losartan-hydrochlorothiazide (HYZAAR) 100-12.5 MG per tablet, , Disp: , Rfl:   •  metoprolol succinate XL (TOPROL-XL) 100 MG 24 hr tablet, Take  by mouth Daily., Disp: , Rfl:   •  Multiple Vitamin (MULTIVITAMINS PO), Take 1 capsule by mouth Daily., Disp: , Rfl:   •  Omega-3 Fatty Acids (FISH OIL CONCENTRATE) 300 MG capsule, Take 1 capsule by mouth Daily., Disp: , Rfl:   •  omeprazole (priLOSEC) 20 MG capsule, Take 1 capsule by mouth Daily., Disp: , Rfl:   •  prochlorperazine (COMPAZINE) 10 MG tablet, TAKE 1 2 TO 1 (ONE HALF TO ONE) TABLET BY MOUTH EVERY 4 TO 6 HOURS AS NEEDED FOR NAUSEA VOMITING, Disp: , Rfl: 0  •  terbinafine (lamiSIL) 250 MG tablet, Take 250 mg by mouth Daily., Disp: , Rfl: 1  •  Loratadine (CLARITIN) 10 MG capsule, Take 1 capsule by mouth Daily., Disp: , Rfl:   •  methylcellulose, Laxative, (CITRUCEL) 500 MG tablet tablet, Take  by mouth Daily., Disp: , Rfl:   •  mupirocin (BACTROBAN) 2 % ointment, Apply  topically to the appropriate area as directed 3 (Three) Times a Day., Disp: 22 g, Rfl: 0  •  raNITIdine (ZANTAC) 300 MG tablet, Take  by mouth Daily., Disp: , Rfl:       Social History     Tobacco Use   • Smoking status: Never Smoker   Substance Use Topics   • Alcohol use: No       Review of Systems   Constitutional: Negative for activity change, chills and fever.   Eyes: Negative for blurred vision.   Respiratory: Positive for shortness of breath (with exertion). Negative for chest tightness and wheezing.    Cardiovascular: Negative for chest pain, palpitations and leg swelling.   Neurological: Positive for dizziness. Negative for tremors, syncope, facial asymmetry,  "speech difficulty, weakness and headache.       I have reviewed and confirmed the accuracy of the ROS as documented by the MA/LPN/RN Selena White MD      Objective   Vitals:    01/24/20 0842   BP: 128/70   Pulse: 56   Temp: 97.8 °F (36.6 °C)   TempSrc: Oral   SpO2: 96%   Weight: 83.3 kg (183 lb 9.6 oz)   Height: 172.7 cm (67.99\")     Body mass index is 27.92 kg/m².    Physical Exam   Constitutional: He is oriented to person, place, and time. He appears well-developed and well-nourished. No distress.   HENT:   Head: Normocephalic and atraumatic.   Mouth/Throat: Oropharynx is clear and moist.   Eyes: Pupils are equal, round, and reactive to light. Conjunctivae and EOM are normal.   Neck: Normal range of motion. Neck supple. No JVD present.   Cardiovascular: Normal rate, regular rhythm and normal heart sounds.   No murmur heard.  Pulmonary/Chest: Effort normal and breath sounds normal.   Abdominal: Soft. Bowel sounds are normal.   Musculoskeletal: Normal range of motion. He exhibits no edema.   Lymphadenopathy:     He has no cervical adenopathy.   Neurological: He is alert and oriented to person, place, and time. No cranial nerve deficit.   Skin: Skin is warm and dry. Capillary refill takes less than 2 seconds. No rash noted.   Psychiatric: He has a normal mood and affect. His behavior is normal. Thought content normal.           Lab Results   Component Value Date     (H) 10/31/2019    BUN 17 10/31/2019    CREATININE 1.27 10/31/2019    EGFRIFNONA 55 (L) 10/31/2019    EGFRIFAFRI 63 10/31/2019     10/31/2019    K 4.8 10/31/2019    CL 99 10/31/2019    CALCIUM 9.7 10/31/2019    ALBUMIN 4.5 10/31/2019    BILITOT 1.1 10/31/2019    ALKPHOS 60 10/31/2019    AST 24 10/31/2019    ALT 15 10/31/2019    CHLPL 131 10/31/2019    TRIG 87 10/31/2019    HDL 45 10/31/2019    VLDL 17 10/31/2019    LDL 69 10/31/2019    WBC 6.9 10/31/2019    RBC 4.50 10/31/2019    HCT 41.5 10/31/2019    MCV 92 10/31/2019    MCH " 32.0 10/31/2019          Assessment/Plan     Diagnoses and all orders for this visit:    1. Essential hypertension (Primary)  Comments:  Adjust BP medication  by decreasing metoprolol (bradycardia) and increasing HCTZ.  Recheck labs and proceed with cardiac testing for changes seen on EKG.  Orders:  -     metoprolol succinate XL (TOPROL-XL) 100 MG 24 hr tablet; Take 0.5 tablets by mouth Daily.  Dispense: 90 tablet; Refill: 0  -     hydroCHLOROthiazide (HYDRODIURIL) 12.5 MG tablet; Take 1 tablet by mouth Daily.  Dispense: 90 tablet; Refill: 1  -     Comprehensive Metabolic Panel  -     TSH  -     CBC & Differential    2. Bradycardia  -     ECG 12 Lead  -     Adult Transthoracic Echo Complete W/ Cont if Necessary Per Protocol; Future  -     Stress Test With Myocardial Perfusion One Day; Future    3. Heart murmur  Comments:  ECHO ordered.  Orders:  -     Adult Transthoracic Echo Complete W/ Cont if Necessary Per Protocol; Future    4. Short of breath on exertion  -     Stress Test With Myocardial Perfusion One Day; Future    5. Hyperlipidemia, unspecified hyperlipidemia type  -     Lipid Panel        Return if symptoms worsen or fail to improve.       Recent Results (from the past 168 hour(s))   Comprehensive Metabolic Panel    Collection Time: 01/24/20 10:23 AM   Result Value Ref Range    Glucose 81 65 - 99 mg/dL    BUN 16 8 - 27 mg/dL    Creatinine 1.19 0.76 - 1.27 mg/dL    eGFR Non African Am 59 (L) >59 mL/min/1.73    eGFR African Am 68 >59 mL/min/1.73    BUN/Creatinine Ratio 13 10 - 24    Sodium 141 134 - 144 mmol/L    Potassium 4.9 3.5 - 5.2 mmol/L    Chloride 101 96 - 106 mmol/L    Total CO2 27 20 - 29 mmol/L    Calcium 10.0 8.6 - 10.2 mg/dL    Total Protein 6.9 6.0 - 8.5 g/dL    Albumin 4.3 3.7 - 4.7 g/dL    Globulin 2.6 1.5 - 4.5 g/dL    A/G Ratio 1.7 1.2 - 2.2    Total Bilirubin 1.2 0.0 - 1.2 mg/dL    Alkaline Phosphatase 64 39 - 117 IU/L    AST (SGOT) 24 0 - 40 IU/L    ALT (SGPT) 16 0 - 44 IU/L   Lipid Panel     Collection Time: 01/24/20 10:23 AM   Result Value Ref Range    Total Cholesterol 116 100 - 199 mg/dL    Triglycerides 78 0 - 149 mg/dL    HDL Cholesterol 43 >39 mg/dL    VLDL Cholesterol 16 5 - 40 mg/dL    LDL Cholesterol  57 0 - 99 mg/dL   TSH    Collection Time: 01/24/20 10:23 AM   Result Value Ref Range    TSH 2.400 0.450 - 4.500 uIU/mL   CBC & Differential    Collection Time: 01/24/20 10:23 AM   Result Value Ref Range    WBC 6.7 3.4 - 10.8 x10E3/uL    RBC 4.36 4.14 - 5.80 x10E6/uL    Hemoglobin 14.2 13.0 - 17.7 g/dL    Hematocrit 41.1 37.5 - 51.0 %    MCV 94 79 - 97 fL    MCH 32.6 26.6 - 33.0 pg    MCHC 34.5 31.5 - 35.7 g/dL    RDW 13.2 11.6 - 15.4 %    Platelets 226 150 - 450 x10E3/uL    Neutrophil Rel % 56 Not Estab. %    Lymphocyte Rel % 23 Not Estab. %    Monocyte Rel % 16 Not Estab. %    Eosinophil Rel % 4 Not Estab. %    Basophil Rel % 1 Not Estab. %    Neutrophils Absolute 3.7 1.4 - 7.0 x10E3/uL    Lymphocytes Absolute 1.5 0.7 - 3.1 x10E3/uL    Monocytes Absolute 1.1 (H) 0.1 - 0.9 x10E3/uL    Eosinophils Absolute 0.3 0.0 - 0.4 x10E3/uL    Basophils Absolute 0.0 0.0 - 0.2 x10E3/uL    Immature Granulocyte Rel % 0 Not Estab. %    Immature Grans Absolute 0.0 0.0 - 0.1 x10E3/uL

## 2020-01-24 NOTE — PROGRESS NOTES
Procedure     ECG 12 Lead  Date/Time: 1/24/2020 9:16 AM  Performed by: Selena White MD  Authorized by: Selena White MD   Comparison: compared with previous ECG   Comparison to previous ECG: Previous EKG in 2010, repolization variant new  Rhythm: sinus bradycardia  Ectopy comments: none  Rate: bradycardic  Conduction: conduction normal  T Waves: T waves normal  QRS axis: normal  Other findings: early repolarization    Clinical impression: abnormal EKG

## 2020-01-25 LAB
ALBUMIN SERPL-MCNC: 4.3 G/DL (ref 3.7–4.7)
ALBUMIN/GLOB SERPL: 1.7 {RATIO} (ref 1.2–2.2)
ALP SERPL-CCNC: 64 IU/L (ref 39–117)
ALT SERPL-CCNC: 16 IU/L (ref 0–44)
AST SERPL-CCNC: 24 IU/L (ref 0–40)
BASOPHILS # BLD AUTO: 0 X10E3/UL (ref 0–0.2)
BASOPHILS NFR BLD AUTO: 1 %
BILIRUB SERPL-MCNC: 1.2 MG/DL (ref 0–1.2)
BUN SERPL-MCNC: 16 MG/DL (ref 8–27)
BUN/CREAT SERPL: 13 (ref 10–24)
CALCIUM SERPL-MCNC: 10 MG/DL (ref 8.6–10.2)
CHLORIDE SERPL-SCNC: 101 MMOL/L (ref 96–106)
CHOLEST SERPL-MCNC: 116 MG/DL (ref 100–199)
CO2 SERPL-SCNC: 27 MMOL/L (ref 20–29)
CREAT SERPL-MCNC: 1.19 MG/DL (ref 0.76–1.27)
EOSINOPHIL # BLD AUTO: 0.3 X10E3/UL (ref 0–0.4)
EOSINOPHIL NFR BLD AUTO: 4 %
ERYTHROCYTE [DISTWIDTH] IN BLOOD BY AUTOMATED COUNT: 13.2 % (ref 11.6–15.4)
GLOBULIN SER CALC-MCNC: 2.6 G/DL (ref 1.5–4.5)
GLUCOSE SERPL-MCNC: 81 MG/DL (ref 65–99)
HCT VFR BLD AUTO: 41.1 % (ref 37.5–51)
HDLC SERPL-MCNC: 43 MG/DL
HGB BLD-MCNC: 14.2 G/DL (ref 13–17.7)
IMM GRANULOCYTES # BLD AUTO: 0 X10E3/UL (ref 0–0.1)
IMM GRANULOCYTES NFR BLD AUTO: 0 %
LDLC SERPL CALC-MCNC: 57 MG/DL (ref 0–99)
LYMPHOCYTES # BLD AUTO: 1.5 X10E3/UL (ref 0.7–3.1)
LYMPHOCYTES NFR BLD AUTO: 23 %
MCH RBC QN AUTO: 32.6 PG (ref 26.6–33)
MCHC RBC AUTO-ENTMCNC: 34.5 G/DL (ref 31.5–35.7)
MCV RBC AUTO: 94 FL (ref 79–97)
MONOCYTES # BLD AUTO: 1.1 X10E3/UL (ref 0.1–0.9)
MONOCYTES NFR BLD AUTO: 16 %
NEUTROPHILS # BLD AUTO: 3.7 X10E3/UL (ref 1.4–7)
NEUTROPHILS NFR BLD AUTO: 56 %
PLATELET # BLD AUTO: 226 X10E3/UL (ref 150–450)
POTASSIUM SERPL-SCNC: 4.9 MMOL/L (ref 3.5–5.2)
PROT SERPL-MCNC: 6.9 G/DL (ref 6–8.5)
RBC # BLD AUTO: 4.36 X10E6/UL (ref 4.14–5.8)
SODIUM SERPL-SCNC: 141 MMOL/L (ref 134–144)
TRIGL SERPL-MCNC: 78 MG/DL (ref 0–149)
TSH SERPL DL<=0.005 MIU/L-ACNC: 2.4 UIU/ML (ref 0.45–4.5)
VLDLC SERPL CALC-MCNC: 16 MG/DL (ref 5–40)
WBC # BLD AUTO: 6.7 X10E3/UL (ref 3.4–10.8)

## 2020-01-27 ENCOUNTER — TELEPHONE (OUTPATIENT)
Dept: FAMILY MEDICINE CLINIC | Facility: CLINIC | Age: 75
End: 2020-01-27

## 2020-01-27 NOTE — TELEPHONE ENCOUNTER
----- Message from Selena White MD sent at 1/25/2020  9:29 AM EST -----  Please let Mr. Emery know that his lab studies look good.  Thanks.

## 2020-01-29 ENCOUNTER — HOSPITAL ENCOUNTER (OUTPATIENT)
Dept: NUCLEAR MEDICINE | Facility: HOSPITAL | Age: 75
Discharge: HOME | End: 2020-01-29

## 2020-01-29 ENCOUNTER — HOSPITAL ENCOUNTER (OUTPATIENT)
Dept: CARDIOLOGY | Facility: HOSPITAL | Age: 75
Discharge: HOME | End: 2020-01-29
Admitting: FAMILY MEDICINE

## 2020-01-29 DIAGNOSIS — R01.1 HEART MURMUR: ICD-10-CM

## 2020-01-29 DIAGNOSIS — R00.1 BRADYCARDIA: ICD-10-CM

## 2020-01-29 DIAGNOSIS — R06.02 SHORT OF BREATH ON EXERTION: ICD-10-CM

## 2020-01-29 LAB
BH CV NUCLEAR PRIOR STUDY: 3
BH CV STRESS BP STAGE 1: NORMAL
BH CV STRESS BP STAGE 2: NORMAL
BH CV STRESS DURATION MIN STAGE 1: 3
BH CV STRESS DURATION MIN STAGE 2: 3
BH CV STRESS DURATION SEC STAGE 1: 0
BH CV STRESS DURATION SEC STAGE 2: 0
BH CV STRESS GRADE STAGE 1: 10
BH CV STRESS GRADE STAGE 2: 12
BH CV STRESS HR STAGE 1: 112
BH CV STRESS HR STAGE 2: 132
BH CV STRESS METS STAGE 1: 5
BH CV STRESS METS STAGE 2: 7.5
BH CV STRESS PROTOCOL 1: NORMAL
BH CV STRESS RECOVERY BP: NORMAL MMHG
BH CV STRESS RECOVERY HR: 70 BPM
BH CV STRESS SPEED STAGE 1: 1.7
BH CV STRESS SPEED STAGE 2: 2.5
BH CV STRESS STAGE 1: 1
BH CV STRESS STAGE 2: 2
LV EF NUC BP: 65 %
MAXIMAL PREDICTED HEART RATE: 144 BPM
PERCENT MAX PREDICTED HR: 91.67 %
STRESS BASELINE BP: NORMAL MMHG
STRESS BASELINE HR: 77 BPM
STRESS PERCENT HR: 108 %
STRESS POST EXERCISE DUR MIN: 5 MIN
STRESS POST EXERCISE DUR SEC: 31 SEC
STRESS POST PEAK BP: NORMAL MMHG
STRESS POST PEAK HR: 132 BPM
STRESS TARGET HR: 122 BPM

## 2020-01-29 PROCEDURE — 93306 TTE W/DOPPLER COMPLETE: CPT

## 2020-01-29 PROCEDURE — 93017 CV STRESS TEST TRACING ONLY: CPT

## 2020-01-29 PROCEDURE — A9500 TC99M SESTAMIBI: HCPCS | Performed by: FAMILY MEDICINE

## 2020-01-29 PROCEDURE — 0 TECHNETIUM SESTAMIBI: Performed by: FAMILY MEDICINE

## 2020-01-29 PROCEDURE — 93018 CV STRESS TEST I&R ONLY: CPT | Performed by: INTERNAL MEDICINE

## 2020-01-29 PROCEDURE — 78452 HT MUSCLE IMAGE SPECT MULT: CPT

## 2020-01-29 PROCEDURE — 93016 CV STRESS TEST SUPVJ ONLY: CPT | Performed by: NURSE PRACTITIONER

## 2020-01-29 PROCEDURE — 78452 HT MUSCLE IMAGE SPECT MULT: CPT | Performed by: INTERNAL MEDICINE

## 2020-01-29 RX ADMIN — TECHNETIUM TC 99M SESTAMIBI 1 DOSE: 1 INJECTION INTRAVENOUS at 11:50

## 2020-01-29 RX ADMIN — TECHNETIUM TC 99M SESTAMIBI 1 DOSE: 1 INJECTION INTRAVENOUS at 13:15

## 2020-01-30 DIAGNOSIS — I35.0 NONRHEUMATIC AORTIC VALVE STENOSIS: Primary | ICD-10-CM

## 2020-01-30 LAB
BH CV ECHO MEAS - ACS: 1.4 CM
BH CV ECHO MEAS - AI DEC SLOPE: 235.3 CM/SEC^2
BH CV ECHO MEAS - AI DEC TIME: 1.8 SEC
BH CV ECHO MEAS - AI MAX PG: 71 MMHG
BH CV ECHO MEAS - AI MAX VEL: 421.3 CM/SEC
BH CV ECHO MEAS - AI P1/2T: 524.4 MSEC
BH CV ECHO MEAS - AO MAX PG (FULL): 63.1 MMHG
BH CV ECHO MEAS - AO MAX PG: 66.3 MMHG
BH CV ECHO MEAS - AO MEAN PG (FULL): 29 MMHG
BH CV ECHO MEAS - AO MEAN PG: 31 MMHG
BH CV ECHO MEAS - AO ROOT AREA (BSA CORRECTED): 1.7
BH CV ECHO MEAS - AO ROOT AREA: 9.3 CM^2
BH CV ECHO MEAS - AO ROOT DIAM: 3.4 CM
BH CV ECHO MEAS - AO V2 MAX: 407.2 CM/SEC
BH CV ECHO MEAS - AO V2 MEAN: 255.6 CM/SEC
BH CV ECHO MEAS - AO V2 VTI: 98.2 CM
BH CV ECHO MEAS - AORTIC HR: 58.6 BPM
BH CV ECHO MEAS - AORTIC R-R: 1 SEC
BH CV ECHO MEAS - ASC AORTA: 3.5 CM
BH CV ECHO MEAS - AVA(I,A): 1.3 CM^2
BH CV ECHO MEAS - AVA(I,D): 1.3 CM^2
BH CV ECHO MEAS - AVA(V,A): 1.1 CM^2
BH CV ECHO MEAS - AVA(V,D): 1.1 CM^2
BH CV ECHO MEAS - BSA(HAYCOCK): 2 M^2
BH CV ECHO MEAS - BSA: 2 M^2
BH CV ECHO MEAS - BZI_BMI: 27.8 KILOGRAMS/M^2
BH CV ECHO MEAS - BZI_METRIC_HEIGHT: 172.7 CM
BH CV ECHO MEAS - BZI_METRIC_WEIGHT: 83 KG
BH CV ECHO MEAS - CI(AO): 27.1 L/MIN/M^2
BH CV ECHO MEAS - CI(LVOT): 3.7 L/MIN/M^2
BH CV ECHO MEAS - CO(AO): 53.4 L/MIN
BH CV ECHO MEAS - CO(LVOT): 7.4 L/MIN
BH CV ECHO MEAS - EDV(CUBED): 151.1 ML
BH CV ECHO MEAS - EDV(MOD-SP4): 93.4 ML
BH CV ECHO MEAS - EDV(TEICH): 136.9 ML
BH CV ECHO MEAS - EF(CUBED): 92 %
BH CV ECHO MEAS - EF(MOD-BP): 64 %
BH CV ECHO MEAS - EF(MOD-SP4): 64.3 %
BH CV ECHO MEAS - EF(TEICH): 86.8 %
BH CV ECHO MEAS - ESV(CUBED): 12.1 ML
BH CV ECHO MEAS - ESV(MOD-SP4): 33.4 ML
BH CV ECHO MEAS - ESV(TEICH): 18.1 ML
BH CV ECHO MEAS - FS: 56.8 %
BH CV ECHO MEAS - IVS/LVPW: 0.83
BH CV ECHO MEAS - IVSD: 0.94 CM
BH CV ECHO MEAS - LA DIMENSION(2D): 3.4 CM
BH CV ECHO MEAS - LV DIASTOLIC VOL/BSA (35-75): 47.5 ML/M^2
BH CV ECHO MEAS - LV MASS(C)D: 211.2 GRAMS
BH CV ECHO MEAS - LV MASS(C)DI: 107.3 GRAMS/M^2
BH CV ECHO MEAS - LV MAX PG: 3.2 MMHG
BH CV ECHO MEAS - LV MEAN PG: 2 MMHG
BH CV ECHO MEAS - LV SYSTOLIC VOL/BSA (12-30): 17 ML/M^2
BH CV ECHO MEAS - LV V1 MAX: 89.6 CM/SEC
BH CV ECHO MEAS - LV V1 MEAN: 67.9 CM/SEC
BH CV ECHO MEAS - LV V1 VTI: 25.6 CM
BH CV ECHO MEAS - LVIDD: 5.3 CM
BH CV ECHO MEAS - LVIDS: 2.3 CM
BH CV ECHO MEAS - LVOT AREA: 4.9 CM^2
BH CV ECHO MEAS - LVOT DIAM: 2.5 CM
BH CV ECHO MEAS - LVPWD: 1.1 CM
BH CV ECHO MEAS - MV A MAX VEL: 96.9 CM/SEC
BH CV ECHO MEAS - MV DEC SLOPE: 275.4 CM/SEC^2
BH CV ECHO MEAS - MV DEC TIME: 0.23 SEC
BH CV ECHO MEAS - MV E MAX VEL: 64.7 CM/SEC
BH CV ECHO MEAS - MV E/A: 0.67
BH CV ECHO MEAS - MV MAX PG: 4.2 MMHG
BH CV ECHO MEAS - MV MEAN PG: 1.4 MMHG
BH CV ECHO MEAS - MV V2 MAX: 102.9 CM/SEC
BH CV ECHO MEAS - MV V2 MEAN: 53.4 CM/SEC
BH CV ECHO MEAS - MV V2 VTI: 25.4 CM
BH CV ECHO MEAS - MVA(VTI): 4.9 CM^2
BH CV ECHO MEAS - PA ACC TIME: 0.09 SEC
BH CV ECHO MEAS - PA MAX PG (FULL): 1.6 MMHG
BH CV ECHO MEAS - PA MAX PG: 3.9 MMHG
BH CV ECHO MEAS - PA MEAN PG (FULL): 0.83 MMHG
BH CV ECHO MEAS - PA MEAN PG: 1.8 MMHG
BH CV ECHO MEAS - PA PR(ACCEL): 39.6 MMHG
BH CV ECHO MEAS - PA V2 MAX: 98.2 CM/SEC
BH CV ECHO MEAS - PA V2 MEAN: 63.9 CM/SEC
BH CV ECHO MEAS - PA V2 VTI: 21.4 CM
BH CV ECHO MEAS - PULM A REVS DUR: 0.13 SEC
BH CV ECHO MEAS - PULM A REVS VEL: 33.3 CM/SEC
BH CV ECHO MEAS - PULM DIAS VEL: 44.1 CM/SEC
BH CV ECHO MEAS - PULM S/D: 1.8
BH CV ECHO MEAS - PULM SYS VEL: 80.9 CM/SEC
BH CV ECHO MEAS - PVA(I,A): 2.9 CM^2
BH CV ECHO MEAS - PVA(I,D): 2.9 CM^2
BH CV ECHO MEAS - PVA(V,A): 2.8 CM^2
BH CV ECHO MEAS - PVA(V,D): 2.8 CM^2
BH CV ECHO MEAS - QP/QS: 0.49
BH CV ECHO MEAS - RAP SYSTOLE: 3 MMHG
BH CV ECHO MEAS - RV MAX PG: 2.2 MMHG
BH CV ECHO MEAS - RV MEAN PG: 0.99 MMHG
BH CV ECHO MEAS - RV V1 MAX: 74.5 CM/SEC
BH CV ECHO MEAS - RV V1 MEAN: 44.7 CM/SEC
BH CV ECHO MEAS - RV V1 VTI: 17 CM
BH CV ECHO MEAS - RVDD: 2.6 CM
BH CV ECHO MEAS - RVOT AREA: 3.6 CM^2
BH CV ECHO MEAS - RVOT DIAM: 2.2 CM
BH CV ECHO MEAS - RVSP: 21.5 MMHG
BH CV ECHO MEAS - SI(AO): 462.7 ML/M^2
BH CV ECHO MEAS - SI(CUBED): 70.6 ML/M^2
BH CV ECHO MEAS - SI(LVOT): 63.9 ML/M^2
BH CV ECHO MEAS - SI(MOD-SP4): 30.5 ML/M^2
BH CV ECHO MEAS - SI(TEICH): 60.4 ML/M^2
BH CV ECHO MEAS - SV(AO): 910.6 ML
BH CV ECHO MEAS - SV(CUBED): 139 ML
BH CV ECHO MEAS - SV(LVOT): 125.8 ML
BH CV ECHO MEAS - SV(MOD-SP4): 60 ML
BH CV ECHO MEAS - SV(RVOT): 62 ML
BH CV ECHO MEAS - SV(TEICH): 118.8 ML
BH CV ECHO MEAS - TR MAX VEL: 215.3 CM/SEC
MAXIMAL PREDICTED HEART RATE: 144 BPM
STRESS TARGET HR: 122 BPM

## 2020-01-30 PROCEDURE — 93306 TTE W/DOPPLER COMPLETE: CPT | Performed by: INTERNAL MEDICINE

## 2020-01-30 NOTE — PROGRESS NOTES
Please let patient know his echo is showing an abnormality of one of his heart valves.  It is not opening normally due to calcium deposits.  I would like him to see a cardiac specialist.  I will put in a referral to cardiology.

## 2020-01-31 ENCOUNTER — TELEPHONE (OUTPATIENT)
Dept: FAMILY MEDICINE CLINIC | Facility: CLINIC | Age: 75
End: 2020-01-31

## 2020-01-31 NOTE — TELEPHONE ENCOUNTER
----- Message from Selena White MD sent at 1/30/2020 10:18 AM EST -----  Please let patient know his echo is showing an abnormality of one of his heart valves.  It is not opening normally due to calcium deposits.  I would like him to see a cardiac specialist.  I will put in a referral to cardiology.

## 2020-01-31 NOTE — TELEPHONE ENCOUNTER
----- Message from Selena White MD sent at 1/29/2020  5:57 PM EST -----  Please let patient know that his stress test is negative.  Thanks.

## 2020-02-20 ENCOUNTER — OFFICE VISIT (OUTPATIENT)
Dept: CARDIOLOGY | Facility: CLINIC | Age: 75
End: 2020-02-20

## 2020-02-20 VITALS
SYSTOLIC BLOOD PRESSURE: 157 MMHG | HEART RATE: 55 BPM | OXYGEN SATURATION: 98 % | HEIGHT: 68 IN | DIASTOLIC BLOOD PRESSURE: 90 MMHG | WEIGHT: 182 LBS | BODY MASS INDEX: 27.58 KG/M2

## 2020-02-20 DIAGNOSIS — I10 ESSENTIAL HYPERTENSION: ICD-10-CM

## 2020-02-20 DIAGNOSIS — I35.0 AORTIC STENOSIS, SEVERE: ICD-10-CM

## 2020-02-20 DIAGNOSIS — E78.2 MIXED HYPERLIPIDEMIA: ICD-10-CM

## 2020-02-20 DIAGNOSIS — R01.1 HEART MURMUR: Primary | ICD-10-CM

## 2020-02-20 PROCEDURE — 99204 OFFICE O/P NEW MOD 45 MIN: CPT | Performed by: INTERNAL MEDICINE

## 2020-02-20 NOTE — PROGRESS NOTES
Encounter Date:02/20/2020  New patient      Patient ID: Luis Emery is a 74 y.o. male.    Chief Complaint:  New patient  Aortic valve stenosis  Bradycardia      History of Present Illness  The patient is a pleasant 74-year-old white male is seen for aortic valve stenosis.  Patient has heart murmur for several years and recently was noted to have bradycardia.  Patient has been on metoprolol and the dosage was decreased from 100 mg to 50 mg.  Patient had an echocardiogram recently that showed significant aortic valve stenosis and patient was referred here for further follow-up.    Patient is asymptomatic.  Occasional lightheadedness.  The patient has been without any chest discomfort ,shortness of breath, palpitations, dizziness or syncope.  Denies having any headache ,abdominal pain ,nausea, vomiting , diarrhea constipation, loss of weight or loss of appetite.  Denies having any excessive bruising ,hematuria or blood in the stool.    Review of all systems negative except as indicated    Review of chronic problems  Hypertension-on medications  Dyslipidemia-on statins  No other chronic problems.  Assessment and Plan     ]]]]]]]]]]]]]]]]]]  Impression  ==========  - Significant aortic valve stenosis with gradient of 66 (peak to peak) and 31 mean gradient and valve area of 1.2 cm²-asymptomatic    Negative stress Cardiolite test 1/29/2020    Echocardiogram 1/29/2020  · Left ventricular systolic function is normal.  · Estimated EF appears to be in the range of 61 - 65%.  · There is severe calcification of the aortic valve mainly affecting the non, left and right coronary cusp(s).  · Moderate to severe aortic valve stenosis is present.  · Aortic valve maximum pressure gradient is 66.3 mmHg.  · Aortic valve mean pressure gradient is 31.0 mmHg.  · Planimetered aortic valve area was 1.2 cm².  · Mild aortic valve regurgitation is present.  · Left ventricular diastolic dysfunction (grade I) consistent with impaired  relaxation.  ·   · -Sinus bradycardia-asymptomatic  ·   · -Hypertension and dyslipidemia  ·   · -History of prostate cancer.  ·   · -Status post cholecystectomy  ·   · - Allergic to nitroglycerin  ·   · -Non-smoker  · =============  · Plan  · =========  · Patient has significant aortic valve stenosis and sinus bradycardia-asymptomatic.  · Sinus bradycardia is better with reducing dose of metoprolol.  · No need for further cardiac work-up at this time.  · The findings were discussed with patient and educated him and his wife at bedside.  · Patient was educated regarding symptoms to watch for such as shortness of breath chest discomfort dizziness syncope.  · Follow-up in the office in 6 months with an echocardiogram.  · Further plan will depend on patient's progress.  · [[[[[[[[[[[[[[[     Diagnosis Plan   1. Heart murmur  Adult Transthoracic Echo Complete W/ Cont if Necessary Per Protocol   2. Aortic stenosis, severe  Adult Transthoracic Echo Complete W/ Cont if Necessary Per Protocol   3. Essential hypertension     4. Mixed hyperlipidemia     LAB RESULTS (LAST 7 DAYS)    CBC        BMP        CMP         BNP        TROPONIN        CoAg        Creatinine Clearance  Estimated Creatinine Clearance: 55.4 mL/min (by C-G formula based on SCr of 1.19 mg/dL).    ABG        Radiology  No radiology results for the last day                The following portions of the patient's history were reviewed and updated as appropriate: allergies, current medications, past family history, past medical history, past social history, past surgical history and problem list.    Review of Systems   Constitution: Negative for chills, fever and malaise/fatigue.   Cardiovascular: Negative for chest pain, dyspnea on exertion, leg swelling, palpitations and syncope.   Respiratory: Negative for shortness of breath.    Skin: Negative for rash.   Neurological: Positive for dizziness and light-headedness. Negative for numbness.         Current  Outpatient Medications:   •  atorvastatin (LIPITOR) 20 MG tablet, Take  by mouth Daily., Disp: , Rfl:   •  glucosamine-chondroitin 500-400 MG per tablet, Take  by mouth Daily., Disp: , Rfl:   •  hydroCHLOROthiazide (HYDRODIURIL) 12.5 MG tablet, Take 1 tablet by mouth Daily., Disp: 90 tablet, Rfl: 1  •  Loratadine (CLARITIN) 10 MG capsule, Take 1 capsule by mouth Daily., Disp: , Rfl:   •  losartan-hydrochlorothiazide (HYZAAR) 100-12.5 MG per tablet, , Disp: , Rfl:   •  methylcellulose, Laxative, (CITRUCEL) 500 MG tablet tablet, Take  by mouth Daily., Disp: , Rfl:   •  metoprolol succinate XL (TOPROL-XL) 100 MG 24 hr tablet, Take 0.5 tablets by mouth Daily., Disp: 90 tablet, Rfl: 0  •  Multiple Vitamin (MULTIVITAMINS PO), Take 1 capsule by mouth Daily., Disp: , Rfl:   •  mupirocin (BACTROBAN) 2 % ointment, Apply  topically to the appropriate area as directed 3 (Three) Times a Day., Disp: 22 g, Rfl: 0  •  Omega-3 Fatty Acids (FISH OIL CONCENTRATE) 300 MG capsule, Take 1 capsule by mouth Daily., Disp: , Rfl:   •  omeprazole (priLOSEC) 20 MG capsule, Take 1 capsule by mouth Daily., Disp: , Rfl:   •  prochlorperazine (COMPAZINE) 10 MG tablet, TAKE 1 2 TO 1 (ONE HALF TO ONE) TABLET BY MOUTH EVERY 4 TO 6 HOURS AS NEEDED FOR NAUSEA VOMITING, Disp: , Rfl: 0  •  raNITIdine (ZANTAC) 300 MG tablet, Take  by mouth Daily., Disp: , Rfl:   •  terbinafine (lamiSIL) 250 MG tablet, Take 250 mg by mouth Daily., Disp: , Rfl: 1    Allergies   Allergen Reactions   • Nitroglycerin Unknown (See Comments)     chills       Family History   Problem Relation Age of Onset   • Testicular cancer Father    • Coronary artery disease Father    • Hypertension Other        Past Surgical History:   Procedure Laterality Date   • ANKLE TENDON REPAIR  July 5th, 2017    Dr. Armijo   • CHOLECYSTECTOMY  03/2010    [facility].   • PROSTATECTOMY  01/1997    [facility].       Past Medical History:   Diagnosis Date   • Abdominal pain   "   Impression: s/p recent cholecystecomy clinically improved   • Acute recurrent frontal sinusitis    • Acute upper respiratory infection    • Anemia     Impression: stable, Hgb 12.1   • Chronic angle-closure glaucoma    • DNR (do not resuscitate)    • Essential hypertension    • Gastroesophageal reflux disease without esophagitis     Impression: Stable on current meds (alternating omeprazole and ranitidine). EGD 1/2017.   • History of measles    • Hyperlipidemia    • Medicare annual wellness visit, subsequent    • Onychomycosis     Impression: Treatment options discussed. Prescription(s) as below. Medication(s) usage and side effects discussed.   • Overweight    • Personal history of malignant neoplasm of prostate    • Screening for depression     Negative Depression Screening (4 or less) ()   • Screening PSA (prostate specific antigen)     Impression: With history of prostate cancer. New urinary symptoms. Recheck PSA.   • Urinary frequency     Impression: Prescription(s) as below. Medication(s) usage and side effects discussed.       Family History   Problem Relation Age of Onset   • Testicular cancer Father    • Coronary artery disease Father    • Hypertension Other        Social History     Socioeconomic History   • Marital status:      Spouse name: Not on file   • Number of children: Not on file   • Years of education: Not on file   • Highest education level: Not on file   Tobacco Use   • Smoking status: Never Smoker   • Smokeless tobacco: Never Used   Substance and Sexual Activity   • Alcohol use: No   • Drug use: Never   • Sexual activity: Defer         Procedures      Objective:       Physical Exam    /90 (BP Location: Left arm, Patient Position: Sitting, Cuff Size: Adult)   Pulse 55   Ht 172.7 cm (68\")   Wt 82.6 kg (182 lb)   SpO2 98%   BMI 27.67 kg/m²   The patient is alert, oriented and in no distress.    Vital signs as noted above.    Head and neck revealed no carotid bruits or " jugular venous distension.  No thyromegaly or lymphadenopathy is present.    Lungs clear.  No wheezing.  Breath sounds are normal bilaterally.    Heart normal first and second heart sounds.  Grade 3/6 to 4/6 systolic murmur..  No pericardial rub is present.  No gallop is present.    Abdomen soft and nontender.  No organomegaly is present.    Extremities revealed good peripheral pulses without any pedal edema.    Skin warm and dry.    Musculoskeletal system is grossly normal.    CNS grossly normal.

## 2020-03-26 ENCOUNTER — TELEPHONE (OUTPATIENT)
Dept: FAMILY MEDICINE CLINIC | Facility: CLINIC | Age: 75
End: 2020-03-26

## 2020-03-26 DIAGNOSIS — I10 ESSENTIAL HYPERTENSION: ICD-10-CM

## 2020-03-26 RX ORDER — HYDROCHLOROTHIAZIDE 25 MG/1
25 TABLET ORAL DAILY
Qty: 90 TABLET | Refills: 2 | Status: SHIPPED | OUTPATIENT
Start: 2020-03-26 | End: 2021-01-08

## 2020-03-26 RX ORDER — LOSARTAN POTASSIUM 100 MG/1
100 TABLET ORAL DAILY
Qty: 90 TABLET | Refills: 2 | Status: SHIPPED | OUTPATIENT
Start: 2020-03-26 | End: 2021-01-08

## 2020-06-25 RX ORDER — OMEPRAZOLE 20 MG/1
CAPSULE, DELAYED RELEASE ORAL
Qty: 90 CAPSULE | Refills: 2 | Status: SHIPPED | OUTPATIENT
Start: 2020-06-25 | End: 2021-03-24

## 2020-06-29 RX ORDER — ATORVASTATIN CALCIUM 20 MG/1
TABLET, FILM COATED ORAL
Qty: 90 TABLET | Refills: 2 | Status: SHIPPED | OUTPATIENT
Start: 2020-06-29 | End: 2021-04-06

## 2020-08-24 ENCOUNTER — OFFICE VISIT (OUTPATIENT)
Dept: CARDIOLOGY | Facility: CLINIC | Age: 75
End: 2020-08-24

## 2020-08-24 ENCOUNTER — HOSPITAL ENCOUNTER (OUTPATIENT)
Dept: CARDIOLOGY | Facility: HOSPITAL | Age: 75
Discharge: HOME | End: 2020-08-24
Admitting: INTERNAL MEDICINE

## 2020-08-24 VITALS
WEIGHT: 186 LBS | SYSTOLIC BLOOD PRESSURE: 136 MMHG | HEIGHT: 68 IN | BODY MASS INDEX: 28.19 KG/M2 | DIASTOLIC BLOOD PRESSURE: 58 MMHG

## 2020-08-24 VITALS
HEART RATE: 51 BPM | SYSTOLIC BLOOD PRESSURE: 169 MMHG | WEIGHT: 186 LBS | BODY MASS INDEX: 28.28 KG/M2 | DIASTOLIC BLOOD PRESSURE: 84 MMHG | OXYGEN SATURATION: 98 %

## 2020-08-24 DIAGNOSIS — I35.0 AORTIC STENOSIS, SEVERE: ICD-10-CM

## 2020-08-24 DIAGNOSIS — R00.1 BRADYCARDIA: ICD-10-CM

## 2020-08-24 DIAGNOSIS — R01.1 HEART MURMUR: ICD-10-CM

## 2020-08-24 DIAGNOSIS — E78.2 MIXED HYPERLIPIDEMIA: Primary | ICD-10-CM

## 2020-08-24 DIAGNOSIS — I10 ESSENTIAL HYPERTENSION: ICD-10-CM

## 2020-08-24 LAB
BH CV ECHO MEAS - ACS: 1.3 CM
BH CV ECHO MEAS - AI DEC SLOPE: 163.5 CM/SEC^2
BH CV ECHO MEAS - AI DEC TIME: 2.4 SEC
BH CV ECHO MEAS - AI MAX PG: 63.9 MMHG
BH CV ECHO MEAS - AI MAX VEL: 398.6 CM/SEC
BH CV ECHO MEAS - AI P1/2T: 714.1 MSEC
BH CV ECHO MEAS - AO MAX PG (FULL): 55.5 MMHG
BH CV ECHO MEAS - AO MAX PG: 60.1 MMHG
BH CV ECHO MEAS - AO MEAN PG (FULL): 35.5 MMHG
BH CV ECHO MEAS - AO MEAN PG: 38.4 MMHG
BH CV ECHO MEAS - AO ROOT AREA (BSA CORRECTED): 1.7
BH CV ECHO MEAS - AO ROOT AREA: 8.5 CM^2
BH CV ECHO MEAS - AO ROOT DIAM: 3.3 CM
BH CV ECHO MEAS - AO V2 MAX: 386.2 CM/SEC
BH CV ECHO MEAS - AO V2 MEAN: 294.1 CM/SEC
BH CV ECHO MEAS - AO V2 VTI: 98.7 CM
BH CV ECHO MEAS - ASC AORTA: 3.4 CM
BH CV ECHO MEAS - AVA(I,A): 0.79 CM^2
BH CV ECHO MEAS - AVA(I,D): 0.79 CM^2
BH CV ECHO MEAS - AVA(V,A): 0.86 CM^2
BH CV ECHO MEAS - AVA(V,D): 0.86 CM^2
BH CV ECHO MEAS - BSA(HAYCOCK): 2 M^2
BH CV ECHO MEAS - BSA: 2 M^2
BH CV ECHO MEAS - BZI_BMI: 28.3 KILOGRAMS/M^2
BH CV ECHO MEAS - BZI_METRIC_HEIGHT: 172.7 CM
BH CV ECHO MEAS - BZI_METRIC_WEIGHT: 84.4 KG
BH CV ECHO MEAS - EDV(CUBED): 115.3 ML
BH CV ECHO MEAS - EDV(MOD-SP4): 64.8 ML
BH CV ECHO MEAS - EDV(TEICH): 111.1 ML
BH CV ECHO MEAS - EF(CUBED): 96.3 %
BH CV ECHO MEAS - EF(MOD-SP4): 73.9 %
BH CV ECHO MEAS - EF(TEICH): 93.3 %
BH CV ECHO MEAS - ESV(CUBED): 4.2 ML
BH CV ECHO MEAS - ESV(MOD-SP4): 16.9 ML
BH CV ECHO MEAS - ESV(TEICH): 7.4 ML
BH CV ECHO MEAS - FS: 66.7 %
BH CV ECHO MEAS - IVS/LVPW: 0.75
BH CV ECHO MEAS - IVSD: 0.96 CM
BH CV ECHO MEAS - LA DIMENSION: 4.6 CM
BH CV ECHO MEAS - LA/AO: 1.4
BH CV ECHO MEAS - LV DIASTOLIC VOL/BSA (35-75): 32.7 ML/M^2
BH CV ECHO MEAS - LV MASS(C)D: 202.2 GRAMS
BH CV ECHO MEAS - LV MASS(C)DI: 102 GRAMS/M^2
BH CV ECHO MEAS - LV MAX PG: 4.7 MMHG
BH CV ECHO MEAS - LV MEAN PG: 2.9 MMHG
BH CV ECHO MEAS - LV SYSTOLIC VOL/BSA (12-30): 8.5 ML/M^2
BH CV ECHO MEAS - LV V1 MAX: 107.9 CM/SEC
BH CV ECHO MEAS - LV V1 MEAN: 82 CM/SEC
BH CV ECHO MEAS - LV V1 VTI: 25.5 CM
BH CV ECHO MEAS - LVIDD: 4.9 CM
BH CV ECHO MEAS - LVIDS: 1.6 CM
BH CV ECHO MEAS - LVOT AREA: 3.1 CM^2
BH CV ECHO MEAS - LVOT DIAM: 2 CM
BH CV ECHO MEAS - LVPWD: 1.3 CM
BH CV ECHO MEAS - MV A MAX VEL: 89.6 CM/SEC
BH CV ECHO MEAS - MV DEC SLOPE: 279.6 CM/SEC^2
BH CV ECHO MEAS - MV DEC TIME: 0.25 SEC
BH CV ECHO MEAS - MV E MAX VEL: 70.2 CM/SEC
BH CV ECHO MEAS - MV E/A: 0.78
BH CV ECHO MEAS - MV MAX PG: 3.1 MMHG
BH CV ECHO MEAS - MV MEAN PG: 0.85 MMHG
BH CV ECHO MEAS - MV V2 MAX: 87.6 CM/SEC
BH CV ECHO MEAS - MV V2 MEAN: 39.8 CM/SEC
BH CV ECHO MEAS - MV V2 VTI: 26.6 CM
BH CV ECHO MEAS - MVA(VTI): 2.9 CM^2
BH CV ECHO MEAS - PA ACC TIME: 0.13 SEC
BH CV ECHO MEAS - PA PR(ACCEL): 19.8 MMHG
BH CV ECHO MEAS - PULM A REVS DUR: 0.09 SEC
BH CV ECHO MEAS - PULM A REVS VEL: 30.9 CM/SEC
BH CV ECHO MEAS - PULM DIAS VEL: 38.9 CM/SEC
BH CV ECHO MEAS - PULM S/D: 1.4
BH CV ECHO MEAS - PULM SYS VEL: 56.4 CM/SEC
BH CV ECHO MEAS - RAP SYSTOLE: 3 MMHG
BH CV ECHO MEAS - RV MAX PG: 2.2 MMHG
BH CV ECHO MEAS - RV MEAN PG: 1 MMHG
BH CV ECHO MEAS - RV V1 MAX: 74.8 CM/SEC
BH CV ECHO MEAS - RV V1 MEAN: 47.4 CM/SEC
BH CV ECHO MEAS - RV V1 VTI: 17.6 CM
BH CV ECHO MEAS - RVDD: 3.3 CM
BH CV ECHO MEAS - RVSP: 20.8 MMHG
BH CV ECHO MEAS - SI(AO): 424.7 ML/M^2
BH CV ECHO MEAS - SI(CUBED): 56.1 ML/M^2
BH CV ECHO MEAS - SI(LVOT): 39.5 ML/M^2
BH CV ECHO MEAS - SI(MOD-SP4): 24.2 ML/M^2
BH CV ECHO MEAS - SI(TEICH): 52.3 ML/M^2
BH CV ECHO MEAS - SV(AO): 841.7 ML
BH CV ECHO MEAS - SV(CUBED): 111.1 ML
BH CV ECHO MEAS - SV(LVOT): 78.3 ML
BH CV ECHO MEAS - SV(MOD-SP4): 47.9 ML
BH CV ECHO MEAS - SV(TEICH): 103.7 ML
BH CV ECHO MEAS - TR MAX VEL: 209.2 CM/SEC
LV EF 2D ECHO EST: 60 %
MAXIMAL PREDICTED HEART RATE: 143 BPM
STRESS TARGET HR: 122 BPM

## 2020-08-24 PROCEDURE — 93000 ELECTROCARDIOGRAM COMPLETE: CPT | Performed by: INTERNAL MEDICINE

## 2020-08-24 PROCEDURE — 99214 OFFICE O/P EST MOD 30 MIN: CPT | Performed by: INTERNAL MEDICINE

## 2020-08-24 PROCEDURE — 93306 TTE W/DOPPLER COMPLETE: CPT

## 2020-08-24 PROCEDURE — 93306 TTE W/DOPPLER COMPLETE: CPT | Performed by: INTERNAL MEDICINE

## 2020-08-24 RX ORDER — FEXOFENADINE HCL 180 MG/1
180 TABLET ORAL DAILY
COMMUNITY
End: 2020-10-09

## 2020-08-24 NOTE — PROGRESS NOTES
Encounter Date:08/24/2020  Last seen 2/20/2020      Patient ID: Luis Emery is a 75 y.o. male.  Chief Complaint:    Aortic valve stenosis  Bradycardia        History of Present Illness  Since I have last seen, the patient has been without any chest discomfort ,shortness of breath, palpitations, dizziness or syncope.  Denies having any headache ,abdominal pain ,nausea, vomiting , diarrhea constipation, loss of weight or loss of appetite.  Denies having any excessive bruising ,hematuria or blood in the stool.    Review of all systems negative except as indicated         Assessment and Plan      ]]]]]]]]]]]]]]]]]]  Impression  ==========  - Significant aortic valve stenosis with gradient of 66 (peak to peak) and 31 mean gradient and valve area of 1.2 cm²-asymptomatic    Echocardiogram 8/24/2020 revealed significant aortic valve stenosis with peak gradient of 70 mmHg, mean gradient of 38 mmHg and valve area of 0.8 cm².     Negative stress Cardiolite test 1/29/2020     Echocardiogram 1/29/2020  · Left ventricular systolic function is normal.  · Estimated EF appears to be in the range of 61 - 65%.  · There is severe calcification of the aortic valve mainly affecting the non, left and right coronary cusp(s).  · Moderate to severe aortic valve stenosis is present.  · Aortic valve maximum pressure gradient is 66.3 mmHg.  · Aortic valve mean pressure gradient is 31.0 mmHg.  · Planimetered aortic valve area was 1.2 cm².  · Mild aortic valve regurgitation is present.  · Left ventricular diastolic dysfunction (grade I) consistent with impaired relaxation.  ·    · -Sinus bradycardia-asymptomatic  ·    · -Hypertension and dyslipidemia  ·    · -History of prostate cancer.  ·    · -Status post cholecystectomy  ·    · - Allergic to nitroglycerin  ·    · -Non-smoker  · =============  · Plan  · =========  · Patient has significant aortic valve stenosis and sinus bradycardia-asymptomatic.  · Sinus bradycardia is better with reducing  dose of metoprolol.  · No need for further cardiac work-up at this time.  · The findings were discussed with patient and educated him.  · Patient was educated regarding symptoms to watch for such as shortness of breath chest discomfort dizziness syncope.  · Follow-up in the office in 6 months with an echocardiogram.  · Further plan will depend on patient's progress.  · [[[[[[[[[[[[[[[                  Diagnosis Plan   1. Mixed hyperlipidemia     2. Heart murmur     3. Aortic stenosis, severe     4. Essential hypertension     5. Bradycardia     LAB RESULTS (LAST 7 DAYS)    CBC        BMP        CMP         BNP        TROPONIN        CoAg        Creatinine Clearance  CrCl cannot be calculated (Patient's most recent lab result is older than the maximum 30 days allowed.).    ABG        Radiology  No radiology results for the last day                The following portions of the patient's history were reviewed and updated as appropriate: allergies, current medications, past family history, past medical history, past social history, past surgical history and problem list.    Review of Systems   Constitution: Negative for fever and malaise/fatigue.   HENT: Negative for congestion and hearing loss.    Eyes: Negative for double vision and visual disturbance.   Cardiovascular: Negative for chest pain, claudication, dyspnea on exertion, leg swelling and syncope.   Respiratory: Negative for cough and shortness of breath.    Endocrine: Negative for cold intolerance.   Skin: Negative for color change and rash.   Musculoskeletal: Negative for arthritis and joint pain.   Gastrointestinal: Negative for abdominal pain and heartburn.   Genitourinary: Negative for hematuria.   Neurological: Negative for excessive daytime sleepiness and dizziness.   Psychiatric/Behavioral: Negative for depression. The patient is not nervous/anxious.    All other systems reviewed and are negative.        Current Outpatient Medications:   •  atorvastatin  (LIPITOR) 20 MG tablet, Take 1 tablet by mouth once daily, Disp: 90 tablet, Rfl: 2  •  fexofenadine (ALLEGRA) 180 MG tablet, Take 180 mg by mouth Daily., Disp: , Rfl:   •  glucosamine-chondroitin 500-400 MG per tablet, Take  by mouth Daily., Disp: , Rfl:   •  hydroCHLOROthiazide (HYDRODIURIL) 25 MG tablet, Take 1 tablet by mouth Daily., Disp: 90 tablet, Rfl: 2  •  losartan (COZAAR) 100 MG tablet, Take 1 tablet by mouth Daily., Disp: 90 tablet, Rfl: 2  •  methylcellulose, Laxative, (CITRUCEL) 500 MG tablet tablet, Take  by mouth Daily., Disp: , Rfl:   •  metoprolol succinate XL (TOPROL-XL) 100 MG 24 hr tablet, Take 0.5 tablets by mouth Daily., Disp: 90 tablet, Rfl: 0  •  Multiple Vitamin (MULTIVITAMINS PO), Take 1 capsule by mouth Daily., Disp: , Rfl:   •  Omega-3 Fatty Acids (FISH OIL CONCENTRATE) 300 MG capsule, Take 1 capsule by mouth Daily., Disp: , Rfl:   •  omeprazole (priLOSEC) 20 MG capsule, Take 1 capsule by mouth once daily, Disp: 90 capsule, Rfl: 2  •  Loratadine (CLARITIN) 10 MG capsule, Take 1 capsule by mouth Daily., Disp: , Rfl:   •  mupirocin (BACTROBAN) 2 % ointment, Apply  topically to the appropriate area as directed 3 (Three) Times a Day., Disp: 22 g, Rfl: 0  •  prochlorperazine (COMPAZINE) 10 MG tablet, TAKE 1 2 TO 1 (ONE HALF TO ONE) TABLET BY MOUTH EVERY 4 TO 6 HOURS AS NEEDED FOR NAUSEA VOMITING, Disp: , Rfl: 0  •  raNITIdine (ZANTAC) 300 MG tablet, Take  by mouth Daily., Disp: , Rfl:   •  terbinafine (lamiSIL) 250 MG tablet, Take 250 mg by mouth Daily., Disp: , Rfl: 1    Allergies   Allergen Reactions   • Nitroglycerin Unknown (See Comments)     chills       Family History   Problem Relation Age of Onset   • Testicular cancer Father    • Coronary artery disease Father    • Hypertension Other        Past Surgical History:   Procedure Laterality Date   • ANKLE TENDON REPAIR  July 5th, 2017    Dr. Armijo   • CHOLECYSTECTOMY  03/2010    [facility].   • PROSTATECTOMY  01/1997     [facility].       Past Medical History:   Diagnosis Date   • Abdominal pain     Impression: s/p recent cholecystecomy clinically improved   • Acute recurrent frontal sinusitis    • Acute upper respiratory infection    • Anemia     Impression: stable, Hgb 12.1   • Chronic angle-closure glaucoma    • DNR (do not resuscitate)    • Essential hypertension    • Gastroesophageal reflux disease without esophagitis     Impression: Stable on current meds (alternating omeprazole and ranitidine). EGD 1/2017.   • History of measles    • Hyperlipidemia    • Medicare annual wellness visit, subsequent    • Onychomycosis     Impression: Treatment options discussed. Prescription(s) as below. Medication(s) usage and side effects discussed.   • Overweight    • Personal history of malignant neoplasm of prostate    • Screening for depression     Negative Depression Screening (4 or less) ()   • Screening PSA (prostate specific antigen)     Impression: With history of prostate cancer. New urinary symptoms. Recheck PSA.   • Urinary frequency     Impression: Prescription(s) as below. Medication(s) usage and side effects discussed.       Family History   Problem Relation Age of Onset   • Testicular cancer Father    • Coronary artery disease Father    • Hypertension Other        Social History     Socioeconomic History   • Marital status:      Spouse name: Not on file   • Number of children: Not on file   • Years of education: Not on file   • Highest education level: Not on file   Tobacco Use   • Smoking status: Never Smoker   • Smokeless tobacco: Never Used   Substance and Sexual Activity   • Alcohol use: No   • Drug use: Never   • Sexual activity: Defer           ECG 12 Lead  Date/Time: 8/24/2020 3:00 PM  Performed by: Luis Angel Frederick MD  Authorized by: Luis Angel Frederick MD   Comparison: compared with previous ECG   Similar to previous ECG  Comparison to previous ECG: Sinus bradycardia 50/min nonspecific ST-T wave  changes normal axis normal intervals no ectopy no change from 1/24/2020                Objective:       Physical Exam    /84   Pulse 51   Wt 84.4 kg (186 lb)   SpO2 98%   BMI 28.28 kg/m²   The patient is alert, oriented and in no distress.    Vital signs as noted above.    Head and neck revealed no carotid bruits or jugular venous distension.  No thyromegaly or lymphadenopathy is present.    Lungs clear.  No wheezing.  Breath sounds are normal bilaterally.    Heart normal first and second heart sounds.  Grade 3/6 to 4/6 systolic murmur..  No pericardial rub is present.  No gallop is present.    Abdomen soft and nontender.  No organomegaly is present.    Extremities revealed good peripheral pulses without any pedal edema.    Skin warm and dry.    Musculoskeletal system is grossly normal.    CNS grossly normal.

## 2020-09-29 DIAGNOSIS — I10 ESSENTIAL HYPERTENSION: ICD-10-CM

## 2020-09-30 RX ORDER — METOPROLOL SUCCINATE 100 MG/1
TABLET, EXTENDED RELEASE ORAL
Qty: 90 TABLET | Refills: 0 | Status: SHIPPED | OUTPATIENT
Start: 2020-09-30 | End: 2021-03-16

## 2020-10-09 ENCOUNTER — OFFICE VISIT (OUTPATIENT)
Dept: FAMILY MEDICINE CLINIC | Facility: CLINIC | Age: 75
End: 2020-10-09

## 2020-10-09 VITALS
WEIGHT: 186 LBS | HEIGHT: 68 IN | SYSTOLIC BLOOD PRESSURE: 137 MMHG | DIASTOLIC BLOOD PRESSURE: 84 MMHG | OXYGEN SATURATION: 95 % | TEMPERATURE: 97.8 F | HEART RATE: 81 BPM | BODY MASS INDEX: 28.19 KG/M2

## 2020-10-09 DIAGNOSIS — R35.0 INCREASED FREQUENCY OF URINATION: Primary | ICD-10-CM

## 2020-10-09 DIAGNOSIS — R82.90 ABNORMAL URINALYSIS: ICD-10-CM

## 2020-10-09 LAB
BILIRUB BLD-MCNC: ABNORMAL MG/DL
CLARITY, POC: CLEAR
COLOR UR: YELLOW
GLUCOSE UR STRIP-MCNC: NEGATIVE MG/DL
KETONES UR QL: ABNORMAL
LEUKOCYTE EST, POC: ABNORMAL
NITRITE UR-MCNC: NEGATIVE MG/ML
PH UR: 9 [PH] (ref 5–8)
PROT UR STRIP-MCNC: ABNORMAL MG/DL
RBC # UR STRIP: NEGATIVE /UL
SP GR UR: 1 (ref 1–1.03)
UROBILINOGEN UR QL: NORMAL

## 2020-10-09 PROCEDURE — 99213 OFFICE O/P EST LOW 20 MIN: CPT | Performed by: FAMILY MEDICINE

## 2020-10-09 PROCEDURE — 81003 URINALYSIS AUTO W/O SCOPE: CPT | Performed by: FAMILY MEDICINE

## 2020-10-09 RX ORDER — CIPROFLOXACIN 500 MG/1
500 TABLET, FILM COATED ORAL 2 TIMES DAILY
Qty: 14 TABLET | Refills: 0 | Status: SHIPPED | OUTPATIENT
Start: 2020-10-09 | End: 2020-10-16

## 2020-10-09 NOTE — PROGRESS NOTES
Chief Complaint   Patient presents with   • Back Pain   • Urinary Tract Infection       Subjective   Luis Emery is a 75 y.o. male.     Back Pain  This is a new problem. The current episode started in the past 7 days. The problem occurs constantly. The problem has been gradually worsening since onset. The pain is present in the lumbar spine. The quality of the pain is described as aching. The pain does not radiate. The pain is at a severity of 4/10. The pain is moderate. The pain is worse during the day. Stiffness is present all day. Pertinent negatives include no abdominal pain, bladder incontinence, dysuria, fever, headaches, leg pain, paresis, perianal numbness or weakness. He has tried nothing for the symptoms. The treatment provided no relief.   Urinary Tract Infection   This is a new problem. The current episode started in the past 7 days. The problem occurs every urination. The pain is at a severity of 0/10. The patient is experiencing no pain. There has been no fever. He is not sexually active. There is no history of pyelonephritis. Associated symptoms include hesitancy and urgency. Pertinent negatives include no chills or flank pain. He has tried nothing for the symptoms. The treatment provided no relief.          I have reviewed and updated his medications, medical history and problem list during today's office visit.       Past Medical History :  Active Ambulatory Problems     Diagnosis Date Noted   • Personal history of prostate cancer 09/12/2019   • Onychomycosis 09/12/2019   • Influenza vaccine administered 09/12/2019   • Increased frequency of urination 09/12/2019   • Hyperlipidemia 09/12/2019   • History of measles 09/12/2019   • Gastroesophageal reflux disease without esophagitis 09/12/2019   • Essential hypertension 09/12/2019   • Screening PSA (prostate specific antigen) 09/12/2019   • Medicare annual wellness visit, subsequent 09/12/2019   • DNR (do not resuscitate) 09/12/2019   • Chronic  angle-closure glaucoma 09/12/2019   • Anemia 09/12/2019   • Heart murmur 02/20/2020   • Aortic stenosis, severe 02/20/2020     Resolved Ambulatory Problems     Diagnosis Date Noted   • No Resolved Ambulatory Problems     Past Medical History:   Diagnosis Date   • Abdominal pain    • Acute recurrent frontal sinusitis    • Acute upper respiratory infection    • Overweight    • Personal history of malignant neoplasm of prostate    • Screening for depression    • Urinary frequency        Medication List:    Current Outpatient Medications:   •  atorvastatin (LIPITOR) 20 MG tablet, Take 1 tablet by mouth once daily, Disp: 90 tablet, Rfl: 2  •  fexofenadine (ALLEGRA) 180 MG tablet, Take 180 mg by mouth Daily., Disp: , Rfl:   •  glucosamine-chondroitin 500-400 MG per tablet, Take  by mouth Daily., Disp: , Rfl:   •  hydroCHLOROthiazide (HYDRODIURIL) 25 MG tablet, Take 1 tablet by mouth Daily., Disp: 90 tablet, Rfl: 2  •  Loratadine (CLARITIN) 10 MG capsule, Take 1 capsule by mouth Daily., Disp: , Rfl:   •  losartan (COZAAR) 100 MG tablet, Take 1 tablet by mouth Daily., Disp: 90 tablet, Rfl: 2  •  methylcellulose, Laxative, (CITRUCEL) 500 MG tablet tablet, Take  by mouth Daily., Disp: , Rfl:   •  metoprolol succinate XL (TOPROL-XL) 100 MG 24 hr tablet, Take 1 tablet by mouth once daily, Disp: 90 tablet, Rfl: 0  •  Multiple Vitamin (MULTIVITAMINS PO), Take 1 capsule by mouth Daily., Disp: , Rfl:   •  mupirocin (BACTROBAN) 2 % ointment, Apply  topically to the appropriate area as directed 3 (Three) Times a Day., Disp: 22 g, Rfl: 0  •  Omega-3 Fatty Acids (FISH OIL CONCENTRATE) 300 MG capsule, Take 1 capsule by mouth Daily., Disp: , Rfl:   •  omeprazole (priLOSEC) 20 MG capsule, Take 1 capsule by mouth once daily, Disp: 90 capsule, Rfl: 2  •  prochlorperazine (COMPAZINE) 10 MG tablet, TAKE 1 2 TO 1 (ONE HALF TO ONE) TABLET BY MOUTH EVERY 4 TO 6 HOURS AS NEEDED FOR NAUSEA VOMITING, Disp: , Rfl: 0  •  raNITIdine (ZANTAC) 300 MG  "tablet, Take  by mouth Daily., Disp: , Rfl:   •  terbinafine (lamiSIL) 250 MG tablet, Take 250 mg by mouth Daily., Disp: , Rfl: 1    Allergies   Allergen Reactions   • Nitroglycerin Unknown (See Comments)     chills       Social History     Tobacco Use   • Smoking status: Never Smoker   • Smokeless tobacco: Never Used   Substance Use Topics   • Alcohol use: No       Review of Systems   Constitutional: Negative for chills and fever.   Respiratory: Negative for shortness of breath.    Cardiovascular: Negative for leg swelling.   Gastrointestinal: Negative for abdominal pain and constipation.   Endocrine: Negative for polydipsia.   Genitourinary: Positive for decreased urine volume, difficulty urinating, hesitancy and urgency. Negative for dysuria, flank pain, scrotal swelling and urinary incontinence.   Musculoskeletal: Positive for back pain.   Neurological: Negative for weakness.         Objective   Vitals:    10/09/20 1039   BP: 137/84   Pulse: 81   Temp: 97.8 °F (36.6 °C)   TempSrc: Temporal   SpO2: 95%   Weight: 84.4 kg (186 lb)   Height: 172.7 cm (68\")     Body mass index is 28.28 kg/m².    Physical Exam  Constitutional:       General: He is not in acute distress.     Appearance: Normal appearance. He is not ill-appearing.   HENT:      Head: Normocephalic and atraumatic.   Eyes:      Conjunctiva/sclera: Conjunctivae normal.   Cardiovascular:      Rate and Rhythm: Normal rate and regular rhythm.      Heart sounds: Murmur present.   Pulmonary:      Effort: Pulmonary effort is normal.      Breath sounds: Normal breath sounds.   Abdominal:      General: There is distension (mild).      Tenderness: There is abdominal tenderness (LLQ). There is no right CVA tenderness, left CVA tenderness, guarding or rebound.   Genitourinary:     Comments: Exam not performed  Musculoskeletal:      Right lower leg: No edema.      Left lower leg: No edema.   Skin:     General: Skin is warm.      Capillary Refill: Capillary refill takes " less than 2 seconds.   Neurological:      Mental Status: He is alert. Mental status is at baseline.   Psychiatric:         Mood and Affect: Mood normal.         Behavior: Behavior normal.         Thought Content: Thought content normal.         Judgment: Judgment normal.       Brief Urine Lab Results  (Last result in the past 365 days)      Color   Clarity   Blood   Leuk Est   Nitrite   Protein   CREAT   Urine HCG        10/09/20 1052 Yellow Clear Negative Trace Negative 30 mg/dL               Assessment/Plan     Diagnoses and all orders for this visit:    1. Increased frequency of urination (Primary)  -     POCT urinalysis dipstick, multipro  -     ciprofloxacin (CIPRO) 500 MG tablet; Take 1 tablet by mouth 2 (Two) Times a Day for 7 days.  Dispense: 14 tablet; Refill: 0    2. Abnormal urinalysis  -     Urine Culture - Urine, Urine, Random Void        Return if symptoms worsen or fail to improve.  Will f/u with patient by phone when culture results return.     I wore protective equipment throughout this patient encounter to include mask and eye protection. Hand hygiene was performed before donning protective equipment and after removal when leaving the room.  Patient also wore a mask.

## 2020-10-11 LAB
BACTERIA UR CULT: NO GROWTH
BACTERIA UR CULT: NORMAL

## 2020-10-12 ENCOUNTER — TELEPHONE (OUTPATIENT)
Dept: FAMILY MEDICINE CLINIC | Facility: CLINIC | Age: 75
End: 2020-10-12

## 2020-10-12 NOTE — TELEPHONE ENCOUNTER
Spoke to pt 10/12/2020, 1:45pm, advised pt of urine culture results per Dr. White.  Pt states he was feeling better, advised to continue with antibiotics per Dr. White.

## 2020-10-12 NOTE — TELEPHONE ENCOUNTER
----- Message from Selena White MD sent at 10/11/2020  4:52 PM EDT -----  Please let patient know that his urine culture is negative for infection.  If he is improved, I do want him to continue taking the antibiotic.  If he is not better, please let me know.  Thank you

## 2020-11-04 ENCOUNTER — OFFICE VISIT (OUTPATIENT)
Dept: FAMILY MEDICINE CLINIC | Facility: CLINIC | Age: 75
End: 2020-11-04

## 2020-11-04 VITALS
HEART RATE: 78 BPM | OXYGEN SATURATION: 95 % | SYSTOLIC BLOOD PRESSURE: 119 MMHG | DIASTOLIC BLOOD PRESSURE: 80 MMHG | HEIGHT: 67 IN | BODY MASS INDEX: 29.41 KG/M2 | TEMPERATURE: 97.8 F | WEIGHT: 187.4 LBS

## 2020-11-04 DIAGNOSIS — R73.9 HYPERGLYCEMIA: ICD-10-CM

## 2020-11-04 DIAGNOSIS — Z78.9 FULL CODE STATUS: ICD-10-CM

## 2020-11-04 DIAGNOSIS — Z00.00 MEDICARE ANNUAL WELLNESS VISIT, SUBSEQUENT: Primary | ICD-10-CM

## 2020-11-04 DIAGNOSIS — I10 ESSENTIAL HYPERTENSION: ICD-10-CM

## 2020-11-04 DIAGNOSIS — E78.2 MIXED HYPERLIPIDEMIA: ICD-10-CM

## 2020-11-04 DIAGNOSIS — N18.31 STAGE 3A CHRONIC KIDNEY DISEASE (HCC): ICD-10-CM

## 2020-11-04 PROBLEM — Z23 INFLUENZA VACCINE ADMINISTERED: Status: RESOLVED | Noted: 2019-09-12 | Resolved: 2020-11-04

## 2020-11-04 PROBLEM — Z66 DNR (DO NOT RESUSCITATE): Status: RESOLVED | Noted: 2019-09-12 | Resolved: 2020-11-04

## 2020-11-04 PROCEDURE — G0439 PPPS, SUBSEQ VISIT: HCPCS | Performed by: FAMILY MEDICINE

## 2020-11-04 PROCEDURE — 99214 OFFICE O/P EST MOD 30 MIN: CPT | Performed by: FAMILY MEDICINE

## 2020-11-10 LAB
ALBUMIN SERPL-MCNC: 4.1 G/DL (ref 3.7–4.7)
ALBUMIN/GLOB SERPL: 1.5 {RATIO} (ref 1.2–2.2)
ALP SERPL-CCNC: 66 IU/L (ref 39–117)
ALT SERPL-CCNC: 18 IU/L (ref 0–44)
AST SERPL-CCNC: 22 IU/L (ref 0–40)
BASOPHILS # BLD AUTO: 0.1 X10E3/UL (ref 0–0.2)
BASOPHILS NFR BLD AUTO: 1 %
BILIRUB SERPL-MCNC: 1.3 MG/DL (ref 0–1.2)
BUN SERPL-MCNC: 18 MG/DL (ref 8–27)
BUN/CREAT SERPL: 14 (ref 10–24)
CALCIUM SERPL-MCNC: 9.4 MG/DL (ref 8.6–10.2)
CHLORIDE SERPL-SCNC: 103 MMOL/L (ref 96–106)
CHOLEST SERPL-MCNC: 124 MG/DL (ref 100–199)
CO2 SERPL-SCNC: 23 MMOL/L (ref 20–29)
CREAT SERPL-MCNC: 1.31 MG/DL (ref 0.76–1.27)
EOSINOPHIL # BLD AUTO: 0.2 X10E3/UL (ref 0–0.4)
EOSINOPHIL NFR BLD AUTO: 4 %
ERYTHROCYTE [DISTWIDTH] IN BLOOD BY AUTOMATED COUNT: 12.6 % (ref 11.6–15.4)
GLOBULIN SER CALC-MCNC: 2.7 G/DL (ref 1.5–4.5)
GLUCOSE SERPL-MCNC: 114 MG/DL (ref 65–99)
HCT VFR BLD AUTO: 41.8 % (ref 37.5–51)
HDLC SERPL-MCNC: 46 MG/DL
HGB BLD-MCNC: 13.6 G/DL (ref 13–17.7)
IMM GRANULOCYTES # BLD AUTO: 0 X10E3/UL (ref 0–0.1)
IMM GRANULOCYTES NFR BLD AUTO: 0 %
LDLC SERPL CALC-MCNC: 62 MG/DL (ref 0–99)
LYMPHOCYTES # BLD AUTO: 1.6 X10E3/UL (ref 0.7–3.1)
LYMPHOCYTES NFR BLD AUTO: 27 %
MCH RBC QN AUTO: 30.7 PG (ref 26.6–33)
MCHC RBC AUTO-ENTMCNC: 32.5 G/DL (ref 31.5–35.7)
MCV RBC AUTO: 94 FL (ref 79–97)
MONOCYTES # BLD AUTO: 0.7 X10E3/UL (ref 0.1–0.9)
MONOCYTES NFR BLD AUTO: 11 %
NEUTROPHILS # BLD AUTO: 3.4 X10E3/UL (ref 1.4–7)
NEUTROPHILS NFR BLD AUTO: 57 %
PLATELET # BLD AUTO: 197 X10E3/UL (ref 150–450)
POTASSIUM SERPL-SCNC: 3.9 MMOL/L (ref 3.5–5.2)
PROT SERPL-MCNC: 6.8 G/DL (ref 6–8.5)
RBC # BLD AUTO: 4.43 X10E6/UL (ref 4.14–5.8)
SODIUM SERPL-SCNC: 140 MMOL/L (ref 134–144)
TRIGL SERPL-MCNC: 80 MG/DL (ref 0–149)
VLDLC SERPL CALC-MCNC: 16 MG/DL (ref 5–40)
WBC # BLD AUTO: 6 X10E3/UL (ref 3.4–10.8)

## 2020-12-13 PROBLEM — Z78.9 FULL CODE STATUS: Status: ACTIVE | Noted: 2020-12-13

## 2021-01-08 DIAGNOSIS — I10 ESSENTIAL HYPERTENSION: ICD-10-CM

## 2021-01-08 RX ORDER — HYDROCHLOROTHIAZIDE 25 MG/1
TABLET ORAL
Qty: 90 TABLET | Refills: 2 | Status: SHIPPED | OUTPATIENT
Start: 2021-01-08 | End: 2021-10-04

## 2021-01-08 RX ORDER — LOSARTAN POTASSIUM 100 MG/1
TABLET ORAL
Qty: 90 TABLET | Refills: 2 | Status: SHIPPED | OUTPATIENT
Start: 2021-01-08 | End: 2021-10-04

## 2021-03-15 ENCOUNTER — OFFICE VISIT (OUTPATIENT)
Dept: CARDIOLOGY | Facility: CLINIC | Age: 76
End: 2021-03-15

## 2021-03-15 ENCOUNTER — HOSPITAL ENCOUNTER (OUTPATIENT)
Dept: CARDIOLOGY | Facility: HOSPITAL | Age: 76
Discharge: HOME | End: 2021-03-15
Admitting: INTERNAL MEDICINE

## 2021-03-15 VITALS
HEIGHT: 67 IN | WEIGHT: 187 LBS | BODY MASS INDEX: 29.35 KG/M2 | SYSTOLIC BLOOD PRESSURE: 126 MMHG | DIASTOLIC BLOOD PRESSURE: 74 MMHG

## 2021-03-15 VITALS
OXYGEN SATURATION: 100 % | TEMPERATURE: 96.8 F | BODY MASS INDEX: 29.11 KG/M2 | SYSTOLIC BLOOD PRESSURE: 160 MMHG | DIASTOLIC BLOOD PRESSURE: 83 MMHG | HEIGHT: 67 IN | WEIGHT: 185.5 LBS | HEART RATE: 56 BPM

## 2021-03-15 DIAGNOSIS — R00.1 BRADYCARDIA: ICD-10-CM

## 2021-03-15 DIAGNOSIS — I10 ESSENTIAL HYPERTENSION: ICD-10-CM

## 2021-03-15 DIAGNOSIS — R01.1 HEART MURMUR: ICD-10-CM

## 2021-03-15 DIAGNOSIS — I35.0 AORTIC STENOSIS, SEVERE: Primary | ICD-10-CM

## 2021-03-15 DIAGNOSIS — I35.0 AORTIC STENOSIS, SEVERE: ICD-10-CM

## 2021-03-15 DIAGNOSIS — E78.2 MIXED HYPERLIPIDEMIA: ICD-10-CM

## 2021-03-15 LAB
BH CV ECHO MEAS - ACS: 0.89 CM
BH CV ECHO MEAS - AI DEC SLOPE: 99.3 CM/SEC^2
BH CV ECHO MEAS - AI DEC TIME: 2.8 SEC
BH CV ECHO MEAS - AI MAX PG: 31.6 MMHG
BH CV ECHO MEAS - AI MAX VEL: 278.4 CM/SEC
BH CV ECHO MEAS - AI P1/2T: 821 MSEC
BH CV ECHO MEAS - AO MAX PG (FULL): 48.2 MMHG
BH CV ECHO MEAS - AO MAX PG: 52.8 MMHG
BH CV ECHO MEAS - AO MEAN PG (FULL): 30.3 MMHG
BH CV ECHO MEAS - AO MEAN PG: 32.7 MMHG
BH CV ECHO MEAS - AO ROOT AREA (BSA CORRECTED): 1.8
BH CV ECHO MEAS - AO ROOT AREA: 9.4 CM^2
BH CV ECHO MEAS - AO ROOT DIAM: 3.5 CM
BH CV ECHO MEAS - AO V2 MAX: 362.9 CM/SEC
BH CV ECHO MEAS - AO V2 MEAN: 273.6 CM/SEC
BH CV ECHO MEAS - AO V2 VTI: 80.1 CM
BH CV ECHO MEAS - ASC AORTA: 3.6 CM
BH CV ECHO MEAS - AVA(I,A): 0.4 CM^2
BH CV ECHO MEAS - AVA(I,D): 0.4 CM^2
BH CV ECHO MEAS - AVA(V,A): 0.4 CM^2
BH CV ECHO MEAS - AVA(V,D): 0.4 CM^2
BH CV ECHO MEAS - BSA(HAYCOCK): 2 M^2
BH CV ECHO MEAS - BSA: 2 M^2
BH CV ECHO MEAS - BZI_BMI: 29.3 KILOGRAMS/M^2
BH CV ECHO MEAS - BZI_METRIC_HEIGHT: 170.2 CM
BH CV ECHO MEAS - BZI_METRIC_WEIGHT: 84.8 KG
BH CV ECHO MEAS - EDV(CUBED): 69 ML
BH CV ECHO MEAS - EDV(MOD-SP4): 30.6 ML
BH CV ECHO MEAS - EDV(TEICH): 74.3 ML
BH CV ECHO MEAS - EF(CUBED): 90 %
BH CV ECHO MEAS - EF(MOD-SP4): 62.5 %
BH CV ECHO MEAS - EF(TEICH): 84.9 %
BH CV ECHO MEAS - ESV(CUBED): 6.9 ML
BH CV ECHO MEAS - ESV(MOD-SP4): 11.5 ML
BH CV ECHO MEAS - ESV(TEICH): 11.2 ML
BH CV ECHO MEAS - FS: 53.6 %
BH CV ECHO MEAS - IVS/LVPW: 1.1
BH CV ECHO MEAS - IVSD: 1.1 CM
BH CV ECHO MEAS - LA DIMENSION: 4 CM
BH CV ECHO MEAS - LA/AO: 1.2
BH CV ECHO MEAS - LV DIASTOLIC VOL/BSA (35-75): 15.6 ML/M^2
BH CV ECHO MEAS - LV MASS(C)D: 146.9 GRAMS
BH CV ECHO MEAS - LV MASS(C)DI: 74.8 GRAMS/M^2
BH CV ECHO MEAS - LV MAX PG: 4.6 MMHG
BH CV ECHO MEAS - LV MEAN PG: 2.4 MMHG
BH CV ECHO MEAS - LV SYSTOLIC VOL/BSA (12-30): 5.8 ML/M^2
BH CV ECHO MEAS - LV V1 MAX: 107.4 CM/SEC
BH CV ECHO MEAS - LV V1 MEAN: 74 CM/SEC
BH CV ECHO MEAS - LV V1 VTI: 23.6 CM
BH CV ECHO MEAS - LVIDD: 4.1 CM
BH CV ECHO MEAS - LVIDS: 1.9 CM
BH CV ECHO MEAS - LVOT AREA: 1.3 CM^2
BH CV ECHO MEAS - LVOT DIAM: 1.3 CM
BH CV ECHO MEAS - LVPWD: 1 CM
BH CV ECHO MEAS - MV A MAX VEL: 93.4 CM/SEC
BH CV ECHO MEAS - MV DEC SLOPE: 230.6 CM/SEC^2
BH CV ECHO MEAS - MV DEC TIME: 0.27 SEC
BH CV ECHO MEAS - MV E MAX VEL: 62.6 CM/SEC
BH CV ECHO MEAS - MV E/A: 0.67
BH CV ECHO MEAS - MV MAX PG: 2.9 MMHG
BH CV ECHO MEAS - MV MEAN PG: 0.9 MMHG
BH CV ECHO MEAS - MV V2 MAX: 85.6 CM/SEC
BH CV ECHO MEAS - MV V2 MEAN: 41.5 CM/SEC
BH CV ECHO MEAS - MV V2 VTI: 22.7 CM
BH CV ECHO MEAS - MVA(VTI): 1.4 CM^2
BH CV ECHO MEAS - RAP SYSTOLE: 8 MMHG
BH CV ECHO MEAS - RVDD: 4.1 CM
BH CV ECHO MEAS - RVSP: 24.6 MMHG
BH CV ECHO MEAS - SI(AO): 383.6 ML/M^2
BH CV ECHO MEAS - SI(CUBED): 31.6 ML/M^2
BH CV ECHO MEAS - SI(LVOT): 16.2 ML/M^2
BH CV ECHO MEAS - SI(MOD-SP4): 9.7 ML/M^2
BH CV ECHO MEAS - SI(TEICH): 32.1 ML/M^2
BH CV ECHO MEAS - SV(AO): 753.7 ML
BH CV ECHO MEAS - SV(CUBED): 62.1 ML
BH CV ECHO MEAS - SV(LVOT): 31.8 ML
BH CV ECHO MEAS - SV(MOD-SP4): 19.1 ML
BH CV ECHO MEAS - SV(TEICH): 63.1 ML
BH CV ECHO MEAS - TR MAX VEL: 203.7 CM/SEC
LV EF 2D ECHO EST: 60 %
MAXIMAL PREDICTED HEART RATE: 143 BPM
STRESS TARGET HR: 122 BPM

## 2021-03-15 PROCEDURE — 93306 TTE W/DOPPLER COMPLETE: CPT

## 2021-03-15 PROCEDURE — 93000 ELECTROCARDIOGRAM COMPLETE: CPT | Performed by: INTERNAL MEDICINE

## 2021-03-15 PROCEDURE — 99214 OFFICE O/P EST MOD 30 MIN: CPT | Performed by: INTERNAL MEDICINE

## 2021-03-15 PROCEDURE — 93306 TTE W/DOPPLER COMPLETE: CPT | Performed by: INTERNAL MEDICINE

## 2021-03-15 NOTE — PROGRESS NOTES
Encounter Date:03/15/2021  Last seen 8/24/2020      Patient ID: Luis Emery is a 75 y.o. male.    Chief Complaint:  Aortic valve stenosis  Bradycardia  Hypertension  Dyslipidemia        History of Present Illness  Occasional mild lightheadedness.  Since I have last seen, the patient has been without any chest discomfort ,shortness of breath, palpitations, dizziness or syncope.  Denies having any headache ,abdominal pain ,nausea, vomiting , diarrhea constipation, loss of weight or loss of appetite.  Denies having any excessive bruising ,hematuria or blood in the stool.    Review of all systems negative except as indicated.    Reviewed ROS.         Assessment and Plan      ]]]]]]]]]]]]]]]]]]  Impression  ==========  - Significant aortic valve stenosis with gradient of 66 (peak to peak) and 31 mean gradient and valve area of 1.2 cm²-asymptomatic    Echocardiogram 3/15/2021  Significant aortic valve stenosis with peak gradient of 56 mmHg mean gradient 37 mmHg and valve area of 0.4 cm².  Mitral annular calcification  Left ventricular size and contractility is normal with ejection fraction of 60%.    Echocardiogram 8/24/2020 revealed significant aortic valve stenosis with peak gradient of 70 mmHg, mean gradient of 38 mmHg and valve area of 0.8 cm².     Negative stress Cardiolite test 1/29/2020     Echocardiogram 1/29/2020  · Left ventricular systolic function is normal.  · Estimated EF appears to be in the range of 61 - 65%.  · There is severe calcification of the aortic valve mainly affecting the non, left and right coronary cusp(s).  · Moderate to severe aortic valve stenosis is present.  · Aortic valve maximum pressure gradient is 66.3 mmHg.  · Aortic valve mean pressure gradient is 31.0 mmHg.  · Planimetered aortic valve area was 1.2 cm².  · Mild aortic valve regurgitation is present.  · Left ventricular diastolic dysfunction (grade I) consistent with impaired relaxation.  ·    · -Sinus  bradycardia-asymptomatic  ·    · -Hypertension and dyslipidemia  ·    · -History of prostate cancer.  ·    · -Status post cholecystectomy  ·    · - Allergic to nitroglycerin  ·    · -Non-smoker  · =============  · Plan  · =========  · Patient has significant aortic valve stenosis-asymptomatic  · Echocardiogram 3/15/2021-as above  ·   · Hypertension-160/80  · Continue losartan metoprolol  ·   · Dyslipidemia-continue atorvastatin  ·   · Sinus bradycardia-asymptomatic.  · Sinus bradycardia is better with reducing dose of metoprolol.  · EKG showed sinus bradycardia without ischemic changes  ·   · No need for further cardiac work-up at this time.  · The findings were discussed with patient and educated him.  · Patient was educated regarding symptoms to watch for such as shortness of breath chest discomfort dizziness syncope.  · Follow-up in the office in 6 months with an echocardiogram.  · Further plan will depend on patient's progress.  · [[[[[[[[[[[[[[[                         Diagnosis Plan   1. Aortic stenosis, severe  Adult Transthoracic Echo Complete W/ Cont if Necessary Per Protocol   2. Heart murmur  Adult Transthoracic Echo Complete W/ Cont if Necessary Per Protocol   3. Mixed hyperlipidemia  Adult Transthoracic Echo Complete W/ Cont if Necessary Per Protocol   4. Essential hypertension  Adult Transthoracic Echo Complete W/ Cont if Necessary Per Protocol   5. Bradycardia  Adult Transthoracic Echo Complete W/ Cont if Necessary Per Protocol   LAB RESULTS (LAST 7 DAYS)    CBC        BMP        CMP         BNP        TROPONIN        CoAg        Creatinine Clearance  CrCl cannot be calculated (Patient's most recent lab result is older than the maximum 30 days allowed.).    ABG        Radiology  No radiology results for the last day                The following portions of the patient's history were reviewed and updated as appropriate: allergies, current medications, past family history, past medical history, past  social history, past surgical history and problem list.    Review of Systems   Constitutional: Negative for malaise/fatigue.   Cardiovascular: Negative for chest pain, leg swelling, palpitations and syncope.   Respiratory: Negative for shortness of breath.    Skin: Negative for rash.   Gastrointestinal: Negative for nausea and vomiting.   Neurological: Positive for light-headedness. Negative for dizziness and numbness.         Current Outpatient Medications:   •  atorvastatin (LIPITOR) 20 MG tablet, Take 1 tablet by mouth once daily, Disp: 90 tablet, Rfl: 2  •  glucosamine-chondroitin 500-400 MG per tablet, Take  by mouth Daily., Disp: , Rfl:   •  hydroCHLOROthiazide (HYDRODIURIL) 25 MG tablet, Take 1 tablet by mouth once daily, Disp: 90 tablet, Rfl: 2  •  Loratadine (CLARITIN) 10 MG capsule, Take 1 capsule by mouth Daily., Disp: , Rfl:   •  losartan (COZAAR) 100 MG tablet, Take 1 tablet by mouth once daily, Disp: 90 tablet, Rfl: 2  •  methylcellulose, Laxative, (CITRUCEL) 500 MG tablet tablet, Take  by mouth Daily., Disp: , Rfl:   •  metoprolol succinate XL (TOPROL-XL) 100 MG 24 hr tablet, Take 1 tablet by mouth once daily, Disp: 90 tablet, Rfl: 0  •  Multiple Vitamin (MULTIVITAMINS PO), Take 1 capsule by mouth Daily., Disp: , Rfl:   •  Omega-3 Fatty Acids (FISH OIL CONCENTRATE) 300 MG capsule, Take 1 capsule by mouth Daily., Disp: , Rfl:   •  omeprazole (priLOSEC) 20 MG capsule, Take 1 capsule by mouth once daily, Disp: 90 capsule, Rfl: 2  •  prochlorperazine (COMPAZINE) 10 MG tablet, TAKE 1 2 TO 1 (ONE HALF TO ONE) TABLET BY MOUTH EVERY 4 TO 6 HOURS AS NEEDED FOR NAUSEA VOMITING, Disp: , Rfl: 0  •  terbinafine (lamiSIL) 250 MG tablet, Take 250 mg by mouth Daily., Disp: , Rfl: 1    Allergies   Allergen Reactions   • Nitroglycerin Unknown (See Comments)     chills       Family History   Problem Relation Age of Onset   • Testicular cancer Father    • Coronary artery disease Father    • Hypertension Other        Past  Surgical History:   Procedure Laterality Date   • ANKLE TENDON REPAIR  July 5th, 2017    Dr. Armijo   • CHOLECYSTECTOMY  03/2010    [facility].   • PROSTATECTOMY  01/1997    [facility].       Past Medical History:   Diagnosis Date   • Abdominal pain     Impression: s/p recent cholecystecomy clinically improved   • Acute recurrent frontal sinusitis    • Acute upper respiratory infection    • Anemia     Impression: stable, Hgb 12.1   • Chronic angle-closure glaucoma    • DNR (do not resuscitate)    • Essential hypertension    • Gastroesophageal reflux disease without esophagitis     Impression: Stable on current meds (alternating omeprazole and ranitidine). EGD 1/2017.   • History of measles    • Hyperlipidemia    • Medicare annual wellness visit, subsequent    • Onychomycosis     Impression: Treatment options discussed. Prescription(s) as below. Medication(s) usage and side effects discussed.   • Overweight    • Personal history of malignant neoplasm of prostate    • Screening for depression     Negative Depression Screening (4 or less) ()   • Screening PSA (prostate specific antigen)     Impression: With history of prostate cancer. New urinary symptoms. Recheck PSA.   • Urinary frequency     Impression: Prescription(s) as below. Medication(s) usage and side effects discussed.       Family History   Problem Relation Age of Onset   • Testicular cancer Father    • Coronary artery disease Father    • Hypertension Other        Social History     Socioeconomic History   • Marital status:      Spouse name: Not on file   • Number of children: Not on file   • Years of education: Not on file   • Highest education level: Not on file   Tobacco Use   • Smoking status: Never Smoker   • Smokeless tobacco: Never Used   Substance and Sexual Activity   • Alcohol use: No   • Drug use: Never   • Sexual activity: Defer           ECG 12 Lead    Date/Time: 3/15/2021 12:37 PM  Performed by: Yoly  "MD Luis Angel  Authorized by: Luis Angel Frederick MD   Comparison: compared with previous ECG   Similar to previous ECG  Comparison to previous ECG: Sinus bradycardia nonspecific ST-T wave changes normal axis normal intervals no ectopy no significant change from 8/24/2020                Objective:       Physical Exam    /83   Pulse 56   Temp 96.8 °F (36 °C)   Ht 170.2 cm (67\")   Wt 84.1 kg (185 lb 8 oz)   SpO2 100%   BMI 29.05 kg/m²   The patient is alert, oriented and in no distress.    Vital signs as noted above.    Head and neck revealed no carotid bruits or jugular venous distension.  No thyromegaly or lymphadenopathy is present.    Lungs clear.  No wheezing.  Breath sounds are normal bilaterally.    Heart normal first and second heart sounds.  Grade 3/6 to 4/6 systolic murmur..  No pericardial rub is present.  No gallop is present.    Abdomen soft and nontender.  No organomegaly is present.    Extremities revealed good peripheral pulses without any pedal edema.    Skin warm and dry.    Musculoskeletal system is grossly normal.    CNS grossly normal.    Reviewed and unchanged from last visit.        "

## 2021-03-16 DIAGNOSIS — I10 ESSENTIAL HYPERTENSION: ICD-10-CM

## 2021-03-16 RX ORDER — METOPROLOL SUCCINATE 100 MG/1
TABLET, EXTENDED RELEASE ORAL
Qty: 90 TABLET | Refills: 2 | Status: SHIPPED | OUTPATIENT
Start: 2021-03-16 | End: 2021-11-03 | Stop reason: HOSPADM

## 2021-03-19 ENCOUNTER — TELEPHONE (OUTPATIENT)
Dept: CARDIOLOGY | Facility: CLINIC | Age: 76
End: 2021-03-19

## 2021-03-19 ENCOUNTER — PATIENT MESSAGE (OUTPATIENT)
Dept: CARDIOLOGY | Facility: CLINIC | Age: 76
End: 2021-03-19

## 2021-03-19 NOTE — TELEPHONE ENCOUNTER
Patient sent through My Chart, please advise.       Contact: [phone]  When I saw Dr Frederick Monday 3/15 he had not seen my Echocardiogram. Then Wednesday I get the visit update with “severe aortic valve narrowing “ that was addressed with me?? It was never mentioned to me, do I need to do anything? Or is there further treatment?  Luis Emery

## 2021-03-19 NOTE — TELEPHONE ENCOUNTER
Please reassure him that he does have significant aortic valve stenosis but unchanged from before.  As discussed with him we will observe him for any symptoms such as increasing shortness of breath etc.  No need to do anything at this time.  If he has any further questions I would like to see him and discussed with him.

## 2021-03-24 RX ORDER — OMEPRAZOLE 20 MG/1
CAPSULE, DELAYED RELEASE ORAL
Qty: 90 CAPSULE | Refills: 2 | Status: SHIPPED | OUTPATIENT
Start: 2021-03-24 | End: 2021-12-21

## 2021-04-06 RX ORDER — ATORVASTATIN CALCIUM 20 MG/1
TABLET, FILM COATED ORAL
Qty: 90 TABLET | Refills: 3 | Status: SHIPPED | OUTPATIENT
Start: 2021-04-06 | End: 2022-03-03 | Stop reason: SDUPTHER

## 2021-09-27 ENCOUNTER — OFFICE VISIT (OUTPATIENT)
Dept: CARDIOLOGY | Facility: CLINIC | Age: 76
End: 2021-09-27

## 2021-09-27 ENCOUNTER — HOSPITAL ENCOUNTER (OUTPATIENT)
Dept: CARDIOLOGY | Facility: HOSPITAL | Age: 76
Discharge: HOME | End: 2021-09-27
Admitting: INTERNAL MEDICINE

## 2021-09-27 VITALS
WEIGHT: 185 LBS | BODY MASS INDEX: 29.03 KG/M2 | HEIGHT: 67 IN | SYSTOLIC BLOOD PRESSURE: 155 MMHG | DIASTOLIC BLOOD PRESSURE: 71 MMHG

## 2021-09-27 VITALS
SYSTOLIC BLOOD PRESSURE: 162 MMHG | HEIGHT: 67 IN | OXYGEN SATURATION: 99 % | WEIGHT: 189 LBS | BODY MASS INDEX: 29.66 KG/M2 | DIASTOLIC BLOOD PRESSURE: 99 MMHG | HEART RATE: 61 BPM

## 2021-09-27 DIAGNOSIS — I35.0 AORTIC STENOSIS, SEVERE: Primary | ICD-10-CM

## 2021-09-27 DIAGNOSIS — I10 ESSENTIAL HYPERTENSION: ICD-10-CM

## 2021-09-27 DIAGNOSIS — R42 DIZZINESS: ICD-10-CM

## 2021-09-27 DIAGNOSIS — R00.1 BRADYCARDIA: ICD-10-CM

## 2021-09-27 DIAGNOSIS — R01.1 HEART MURMUR: ICD-10-CM

## 2021-09-27 DIAGNOSIS — E78.2 MIXED HYPERLIPIDEMIA: ICD-10-CM

## 2021-09-27 DIAGNOSIS — I35.0 AORTIC STENOSIS, SEVERE: ICD-10-CM

## 2021-09-27 LAB
BH CV ECHO MEAS - ACS: 1.2 CM
BH CV ECHO MEAS - AI DEC SLOPE: 139 CM/SEC^2
BH CV ECHO MEAS - AI DEC TIME: 2.6 SEC
BH CV ECHO MEAS - AI MAX PG: 43.8 MMHG
BH CV ECHO MEAS - AI MAX VEL: 326.3 CM/SEC
BH CV ECHO MEAS - AI P1/2T: 687.5 MSEC
BH CV ECHO MEAS - AO MAX PG (FULL): 51.6 MMHG
BH CV ECHO MEAS - AO MAX PG: 54.7 MMHG
BH CV ECHO MEAS - AO MEAN PG (FULL): 37.6 MMHG
BH CV ECHO MEAS - AO MEAN PG: 39.3 MMHG
BH CV ECHO MEAS - AO ROOT AREA (BSA CORRECTED): 1.7
BH CV ECHO MEAS - AO ROOT AREA: 8.2 CM^2
BH CV ECHO MEAS - AO ROOT DIAM: 3.2 CM
BH CV ECHO MEAS - AO V2 MAX: 369.7 CM/SEC
BH CV ECHO MEAS - AO V2 MEAN: 305.9 CM/SEC
BH CV ECHO MEAS - AO V2 VTI: 88.5 CM
BH CV ECHO MEAS - ASC AORTA: 3.7 CM
BH CV ECHO MEAS - AVA(I,A): 0.46 CM^2
BH CV ECHO MEAS - AVA(I,D): 0.46 CM^2
BH CV ECHO MEAS - AVA(V,A): 0.49 CM^2
BH CV ECHO MEAS - AVA(V,D): 0.49 CM^2
BH CV ECHO MEAS - BSA(HAYCOCK): 2 M^2
BH CV ECHO MEAS - BSA: 2 M^2
BH CV ECHO MEAS - BZI_BMI: 29 KILOGRAMS/M^2
BH CV ECHO MEAS - BZI_METRIC_HEIGHT: 170.2 CM
BH CV ECHO MEAS - BZI_METRIC_WEIGHT: 83.9 KG
BH CV ECHO MEAS - EDV(CUBED): 54.4 ML
BH CV ECHO MEAS - EDV(MOD-SP2): 44.6 ML
BH CV ECHO MEAS - EDV(MOD-SP4): 32.9 ML
BH CV ECHO MEAS - EDV(TEICH): 61.5 ML
BH CV ECHO MEAS - EF(CUBED): 82.2 %
BH CV ECHO MEAS - EF(MOD-SP2): 61.5 %
BH CV ECHO MEAS - EF(MOD-SP4): 69 %
BH CV ECHO MEAS - EF(TEICH): 75.7 %
BH CV ECHO MEAS - ESV(CUBED): 9.7 ML
BH CV ECHO MEAS - ESV(MOD-SP2): 17.2 ML
BH CV ECHO MEAS - ESV(MOD-SP4): 10.2 ML
BH CV ECHO MEAS - ESV(TEICH): 15 ML
BH CV ECHO MEAS - FS: 43.7 %
BH CV ECHO MEAS - IVS/LVPW: 1.2
BH CV ECHO MEAS - IVSD: 1.5 CM
BH CV ECHO MEAS - LA DIMENSION: 3.7 CM
BH CV ECHO MEAS - LA/AO: 1.2
BH CV ECHO MEAS - LV DIASTOLIC VOL/BSA (35-75): 16.8 ML/M^2
BH CV ECHO MEAS - LV MASS(C)D: 188.1 GRAMS
BH CV ECHO MEAS - LV MASS(C)DI: 96.2 GRAMS/M^2
BH CV ECHO MEAS - LV MAX PG: 3.1 MMHG
BH CV ECHO MEAS - LV MEAN PG: 1.7 MMHG
BH CV ECHO MEAS - LV SYSTOLIC VOL/BSA (12-30): 5.2 ML/M^2
BH CV ECHO MEAS - LV V1 MAX: 87.8 CM/SEC
BH CV ECHO MEAS - LV V1 MEAN: 62.1 CM/SEC
BH CV ECHO MEAS - LV V1 VTI: 20 CM
BH CV ECHO MEAS - LVIDD: 3.8 CM
BH CV ECHO MEAS - LVIDS: 2.1 CM
BH CV ECHO MEAS - LVOT AREA: 2.1 CM^2
BH CV ECHO MEAS - LVOT DIAM: 1.6 CM
BH CV ECHO MEAS - LVPWD: 1.2 CM
BH CV ECHO MEAS - MV A MAX VEL: 90.7 CM/SEC
BH CV ECHO MEAS - MV DEC SLOPE: 151.9 CM/SEC^2
BH CV ECHO MEAS - MV DEC TIME: 0.35 SEC
BH CV ECHO MEAS - MV E MAX VEL: 53.5 CM/SEC
BH CV ECHO MEAS - MV E/A: 0.59
BH CV ECHO MEAS - MV MAX PG: 3.5 MMHG
BH CV ECHO MEAS - MV MEAN PG: 0.77 MMHG
BH CV ECHO MEAS - MV V2 MAX: 94.2 CM/SEC
BH CV ECHO MEAS - MV V2 MEAN: 37.7 CM/SEC
BH CV ECHO MEAS - MV V2 VTI: 21 CM
BH CV ECHO MEAS - MVA(VTI): 2 CM^2
BH CV ECHO MEAS - PA MAX PG (FULL): 2.6 MMHG
BH CV ECHO MEAS - PA MAX PG: 4.6 MMHG
BH CV ECHO MEAS - PA V2 MAX: 106.8 CM/SEC
BH CV ECHO MEAS - PULM A REVS DUR: 0.08 SEC
BH CV ECHO MEAS - PULM A REVS VEL: 25.3 CM/SEC
BH CV ECHO MEAS - PULM DIAS VEL: 47.8 CM/SEC
BH CV ECHO MEAS - PULM S/D: 1.3
BH CV ECHO MEAS - PULM SYS VEL: 60.4 CM/SEC
BH CV ECHO MEAS - RAP SYSTOLE: 3 MMHG
BH CV ECHO MEAS - RV MAX PG: 2 MMHG
BH CV ECHO MEAS - RV MEAN PG: 1.3 MMHG
BH CV ECHO MEAS - RV V1 MAX: 70.5 CM/SEC
BH CV ECHO MEAS - RV V1 MEAN: 54.5 CM/SEC
BH CV ECHO MEAS - RV V1 VTI: 17.2 CM
BH CV ECHO MEAS - RVDD: 3 CM
BH CV ECHO MEAS - RVSP: 22 MMHG
BH CV ECHO MEAS - SI(AO): 372.7 ML/M^2
BH CV ECHO MEAS - SI(CUBED): 22.8 ML/M^2
BH CV ECHO MEAS - SI(LVOT): 21 ML/M^2
BH CV ECHO MEAS - SI(MOD-SP2): 14 ML/M^2
BH CV ECHO MEAS - SI(MOD-SP4): 11.6 ML/M^2
BH CV ECHO MEAS - SI(TEICH): 23.8 ML/M^2
BH CV ECHO MEAS - SV(AO): 729.1 ML
BH CV ECHO MEAS - SV(CUBED): 44.7 ML
BH CV ECHO MEAS - SV(LVOT): 41.1 ML
BH CV ECHO MEAS - SV(MOD-SP2): 27.4 ML
BH CV ECHO MEAS - SV(MOD-SP4): 22.7 ML
BH CV ECHO MEAS - SV(TEICH): 46.5 ML
BH CV ECHO MEAS - TR MAX VEL: 216 CM/SEC
LV EF 2D ECHO EST: 60 %
MAXIMAL PREDICTED HEART RATE: 142 BPM
STRESS TARGET HR: 121 BPM

## 2021-09-27 PROCEDURE — 93306 TTE W/DOPPLER COMPLETE: CPT

## 2021-09-27 PROCEDURE — 99215 OFFICE O/P EST HI 40 MIN: CPT | Performed by: INTERNAL MEDICINE

## 2021-09-27 PROCEDURE — 93000 ELECTROCARDIOGRAM COMPLETE: CPT | Performed by: INTERNAL MEDICINE

## 2021-09-27 PROCEDURE — 93306 TTE W/DOPPLER COMPLETE: CPT | Performed by: INTERNAL MEDICINE

## 2021-09-27 NOTE — PROGRESS NOTES
Encounter Date:09/27/2021  Last seen 3/15/2021      Patient ID: Luis MONGE Elder is a 76 y.o. male.    Chief Complaint:  Aortic valve stenosis  Bradycardia  Hypertension  Dyslipidemia        History of Present Illness  Patient is having dizziness especially while driving and also having near syncopal feeling.    Since I have last seen, the patient has been without any chest discomfort ,shortness of breath, palpitations or syncope.  Denies having any headache ,abdominal pain ,nausea, vomiting , diarrhea constipation, loss of weight or loss of appetite.  Denies having any excessive bruising ,hematuria or blood in the stool.     Review of all systems negative except as indicated.     Reviewed ROS.         Assessment and Plan      ]]]]]]]]]]]]]]]]]]  Impression  ==========  - Significant aortic valve stenosis with gradient of 66 (peak to peak) and 31 mean gradient and valve area of 1.2 cm²-dizziness and near syncopal feeling.  Echocardiogram 9/27/2021 revealed  Normal left ventricle function significant aortic valve stenosis with valve area of 0.4-0.5 cm².  Gradient 50/35 mmHg.    Echocardiogram 3/15/2021  Significant aortic valve stenosis with peak gradient of 56 mmHg mean gradient 37 mmHg and valve area of 0.4 cm².  Mitral annular calcification  Left ventricular size and contractility is normal with ejection fraction of 60%.     Echocardiogram 8/24/2020 revealed significant aortic valve stenosis with peak gradient of 70 mmHg, mean gradient of 38 mmHg and valve area of 0.8 cm².     Negative stress Cardiolite test 1/29/2020     Echocardiogram 1/29/2020  · Left ventricular systolic function is normal.  · Estimated EF appears to be in the range of 61 - 65%.  · There is severe calcification of the aortic valve mainly affecting the non, left and right coronary cusp(s).  · Moderate to severe aortic valve stenosis is present.  · Aortic valve maximum pressure gradient is 66.3 mmHg.  · Aortic valve mean pressure gradient is 31.0  mmHg.  · Planimetered aortic valve area was 1.2 cm².  · Mild aortic valve regurgitation is present.  · Left ventricular diastolic dysfunction (grade I) consistent with impaired relaxation.  ·    · -Sinus bradycardia-asymptomatic  ·    · -Hypertension and dyslipidemia  ·    · -History of prostate cancer.  ·    · -Status post cholecystectomy  ·    · - Allergic to nitroglycerin  ·    · -Non-smoker  · =============  · Plan  · =========  · Patient has significant aortic valve stenosis-having dizziness and near syncope.  · Echocardiogram 9/27/2021-as above   ·    · Hypertension-160/99  · Continue losartan metoprolol  ·    · Dyslipidemia-continue atorvastatin  ·    · Sinus bradycardia-asymptomatic.  · Sinus bradycardia is better with reducing dose of metoprolol.  · EKG showed sinus bradycardia without ischemic changes-9/27/2021  ·    · Patient appears to be getting more symptomatic with valvular stenosis with dizziness and near syncope.  · Patient and patient's family were advised BECKI right and left heart catheterization for further evaluation.  · Risks and benefits pros and cons of the procedure including infection bleeding blood clot heart attack stroke renal dysfunction allergic reaction to the dye etc. were discussed with patient.  ·   · Further plan will depend on patient's progress.  · [[[[[[[[[[[[[[[                        Diagnosis Plan   1. Aortic stenosis, severe  Case Request Cath Lab: Right and Left Heart Cath with Coronar Angiography    CBC (No Diff)    Basic Metabolic Panel    Protime-INR    ECG 12 Lead    Lipid Panel    XR Chest 2 View    Adult Transesophageal Echo (BECKI) W/ Cont if Necessary Per Protocol   2. Heart murmur  Case Request Cath Lab: Right and Left Heart Cath with Coronar Angiography    CBC (No Diff)    Basic Metabolic Panel    Protime-INR    ECG 12 Lead    Lipid Panel    XR Chest 2 View    Adult Transesophageal Echo (BECKI) W/ Cont if Necessary Per Protocol   3. Essential hypertension  Case  Request Cath Lab: Right and Left Heart Cath with Coronar Angiography    CBC (No Diff)    Basic Metabolic Panel    Protime-INR    ECG 12 Lead    Lipid Panel    XR Chest 2 View    Adult Transesophageal Echo (BECKI) W/ Cont if Necessary Per Protocol   4. Bradycardia  Case Request Cath Lab: Right and Left Heart Cath with Coronar Angiography    CBC (No Diff)    Basic Metabolic Panel    Protime-INR    ECG 12 Lead    Lipid Panel    XR Chest 2 View    Adult Transesophageal Echo (BECKI) W/ Cont if Necessary Per Protocol   5. Mixed hyperlipidemia  Case Request Cath Lab: Right and Left Heart Cath with Coronar Angiography    CBC (No Diff)    Basic Metabolic Panel    Protime-INR    ECG 12 Lead    Lipid Panel    XR Chest 2 View    Adult Transesophageal Echo (BECKI) W/ Cont if Necessary Per Protocol   6. Dizziness     LAB RESULTS (LAST 7 DAYS)    CBC        BMP        CMP         BNP        TROPONIN        CoAg        Creatinine Clearance  CrCl cannot be calculated (Patient's most recent lab result is older than the maximum 30 days allowed.).    ABG        Radiology  No radiology results for the last day                The following portions of the patient's history were reviewed and updated as appropriate: allergies, current medications, past family history, past medical history, past social history, past surgical history and problem list.    Review of Systems   Constitutional: Negative for malaise/fatigue.   Cardiovascular: Negative for chest pain, leg swelling, palpitations and syncope.   Respiratory: Negative for shortness of breath.    Skin: Negative for rash.   Gastrointestinal: Negative for nausea and vomiting.   Neurological: Positive for dizziness (one episode while driving). Negative for light-headedness and numbness.   All other systems reviewed and are negative.        Current Outpatient Medications:   •  atorvastatin (LIPITOR) 20 MG tablet, Take 1 tablet by mouth once daily, Disp: 90 tablet, Rfl: 3  •   glucosamine-chondroitin 500-400 MG per tablet, Take  by mouth Daily., Disp: , Rfl:   •  Loratadine (CLARITIN) 10 MG capsule, Take 1 capsule by mouth Daily., Disp: , Rfl:   •  losartan (COZAAR) 100 MG tablet, Take 1 tablet by mouth once daily, Disp: 90 tablet, Rfl: 2  •  methylcellulose, Laxative, (CITRUCEL) 500 MG tablet tablet, Take  by mouth Daily., Disp: , Rfl:   •  metoprolol succinate XL (TOPROL-XL) 100 MG 24 hr tablet, Take 1 tablet by mouth once daily, Disp: 90 tablet, Rfl: 2  •  Multiple Vitamin (MULTIVITAMINS PO), Take 1 capsule by mouth Daily., Disp: , Rfl:   •  Omega-3 Fatty Acids (FISH OIL CONCENTRATE) 300 MG capsule, Take 1 capsule by mouth Daily., Disp: , Rfl:   •  omeprazole (priLOSEC) 20 MG capsule, Take 1 capsule by mouth once daily, Disp: 90 capsule, Rfl: 2  •  prochlorperazine (COMPAZINE) 10 MG tablet, TAKE 1 2 TO 1 (ONE HALF TO ONE) TABLET BY MOUTH EVERY 4 TO 6 HOURS AS NEEDED FOR NAUSEA VOMITING, Disp: , Rfl: 0  •  hydroCHLOROthiazide (HYDRODIURIL) 25 MG tablet, Take 1 tablet by mouth once daily, Disp: 90 tablet, Rfl: 2  •  terbinafine (lamiSIL) 250 MG tablet, Take 250 mg by mouth Daily., Disp: , Rfl: 1    Allergies   Allergen Reactions   • Nitroglycerin Unknown (See Comments)     chills       Family History   Problem Relation Age of Onset   • Testicular cancer Father    • Coronary artery disease Father    • Hypertension Other        Past Surgical History:   Procedure Laterality Date   • ANKLE TENDON REPAIR  July 5th, 2017    Dr. Armijo   • CHOLECYSTECTOMY  03/2010    [facility].   • PROSTATECTOMY  01/1997    [facility].       Past Medical History:   Diagnosis Date   • Abdominal pain     Impression: s/p recent cholecystecomy clinically improved   • Acute recurrent frontal sinusitis    • Acute upper respiratory infection    • Anemia     Impression: stable, Hgb 12.1   • Chronic angle-closure glaucoma    • DNR (do not resuscitate)    • Essential hypertension    •  "Gastroesophageal reflux disease without esophagitis     Impression: Stable on current meds (alternating omeprazole and ranitidine). EGD 1/2017.   • History of measles    • Hyperlipidemia    • Medicare annual wellness visit, subsequent    • Onychomycosis     Impression: Treatment options discussed. Prescription(s) as below. Medication(s) usage and side effects discussed.   • Overweight    • Personal history of malignant neoplasm of prostate    • Screening for depression     Negative Depression Screening (4 or less) ()   • Screening PSA (prostate specific antigen)     Impression: With history of prostate cancer. New urinary symptoms. Recheck PSA.   • Urinary frequency     Impression: Prescription(s) as below. Medication(s) usage and side effects discussed.       Family History   Problem Relation Age of Onset   • Testicular cancer Father    • Coronary artery disease Father    • Hypertension Other        Social History     Socioeconomic History   • Marital status:      Spouse name: Not on file   • Number of children: Not on file   • Years of education: Not on file   • Highest education level: Not on file   Tobacco Use   • Smoking status: Never Smoker   • Smokeless tobacco: Never Used   Vaping Use   • Vaping Use: Never used   Substance and Sexual Activity   • Alcohol use: No   • Drug use: Never   • Sexual activity: Defer           ECG 12 Lead    Date/Time: 9/27/2021 10:42 AM  Performed by: Luis Angel Frederick MD  Authorized by: Luis Angel Frederick MD   Comparison: compared with previous ECG   Similar to previous ECG  Comparison to previous ECG: Sinus bradycardia 57/min nonspecific ST-T wave changes normal axis normal intervals no ectopy no significant change from 3/15/2021                Objective:       Physical Exam    /99 (BP Location: Left arm, Patient Position: Sitting, Cuff Size: Large Adult)   Pulse 61   Ht 170.2 cm (67\")   Wt 85.7 kg (189 lb)   SpO2 99%   BMI 29.60 kg/m²   The patient is alert, " oriented and in no distress.    Vital signs as noted above.    Head and neck revealed no carotid bruits or jugular venous distension.  No thyromegaly or lymphadenopathy is present.    Lungs clear.  No wheezing.  Breath sounds are normal bilaterally.    Heart normal first and second heart sounds.  Grade 3/6 to 4/6 systolic murmur..  No pericardial rub is present.  No gallop is present.    Abdomen soft and nontender.  No organomegaly is present.    Extremities revealed good peripheral pulses without any pedal edema.    Skin warm and dry.    Musculoskeletal system is grossly normal.    CNS grossly normal.    Reviewed and unchanged from last visit.

## 2021-09-28 PROBLEM — R00.1 BRADYCARDIA: Status: ACTIVE | Noted: 2021-09-28

## 2021-09-29 ENCOUNTER — HOSPITAL ENCOUNTER (OUTPATIENT)
Dept: GENERAL RADIOLOGY | Facility: HOSPITAL | Age: 76
Discharge: HOME | End: 2021-09-29

## 2021-09-29 ENCOUNTER — HOSPITAL ENCOUNTER (OUTPATIENT)
Dept: CARDIOLOGY | Facility: HOSPITAL | Age: 76
Discharge: HOME | End: 2021-09-29

## 2021-09-29 ENCOUNTER — LAB (OUTPATIENT)
Dept: LAB | Facility: HOSPITAL | Age: 76
End: 2021-09-29

## 2021-09-29 DIAGNOSIS — E78.2 MIXED HYPERLIPIDEMIA: ICD-10-CM

## 2021-09-29 DIAGNOSIS — R01.1 HEART MURMUR: ICD-10-CM

## 2021-09-29 DIAGNOSIS — I10 ESSENTIAL HYPERTENSION: ICD-10-CM

## 2021-09-29 DIAGNOSIS — R00.1 BRADYCARDIA: ICD-10-CM

## 2021-09-29 DIAGNOSIS — I35.0 AORTIC STENOSIS, SEVERE: ICD-10-CM

## 2021-09-29 LAB
ANION GAP SERPL CALCULATED.3IONS-SCNC: 8.9 MMOL/L (ref 5–15)
BUN SERPL-MCNC: 20 MG/DL (ref 8–23)
BUN/CREAT SERPL: 15.2 (ref 7–25)
CALCIUM SPEC-SCNC: 10.1 MG/DL (ref 8.6–10.5)
CHLORIDE SERPL-SCNC: 102 MMOL/L (ref 98–107)
CHOLEST SERPL-MCNC: 106 MG/DL (ref 0–200)
CO2 SERPL-SCNC: 28.1 MMOL/L (ref 22–29)
CREAT SERPL-MCNC: 1.32 MG/DL (ref 0.76–1.27)
DEPRECATED RDW RBC AUTO: 43.1 FL (ref 37–54)
ERYTHROCYTE [DISTWIDTH] IN BLOOD BY AUTOMATED COUNT: 12.4 % (ref 12.3–15.4)
GFR SERPL CREATININE-BSD FRML MDRD: 52 ML/MIN/1.73
GLUCOSE SERPL-MCNC: 128 MG/DL (ref 65–99)
HCT VFR BLD AUTO: 42.2 % (ref 37.5–51)
HDLC SERPL-MCNC: 38 MG/DL (ref 40–60)
HGB BLD-MCNC: 14.4 G/DL (ref 13–17.7)
INR PPP: 1.02 (ref 0.93–1.1)
LDLC SERPL CALC-MCNC: 49 MG/DL (ref 0–100)
LDLC/HDLC SERPL: 1.25 {RATIO}
MCH RBC QN AUTO: 32.4 PG (ref 26.6–33)
MCHC RBC AUTO-ENTMCNC: 34.1 G/DL (ref 31.5–35.7)
MCV RBC AUTO: 94.8 FL (ref 79–97)
PLATELET # BLD AUTO: 203 10*3/MM3 (ref 140–450)
PMV BLD AUTO: 10.1 FL (ref 6–12)
POTASSIUM SERPL-SCNC: 4.2 MMOL/L (ref 3.5–5.2)
PROTHROMBIN TIME: 11.3 SECONDS (ref 9.6–11.7)
QT INTERVAL: 374 MS
RBC # BLD AUTO: 4.45 10*6/MM3 (ref 4.14–5.8)
SARS-COV-2 ORF1AB RESP QL NAA+PROBE: NOT DETECTED
SODIUM SERPL-SCNC: 139 MMOL/L (ref 136–145)
TRIGL SERPL-MCNC: 102 MG/DL (ref 0–150)
VLDLC SERPL-MCNC: 19 MG/DL (ref 5–40)
WBC # BLD AUTO: 8.44 10*3/MM3 (ref 3.4–10.8)

## 2021-09-29 PROCEDURE — 85027 COMPLETE CBC AUTOMATED: CPT

## 2021-09-29 PROCEDURE — 80061 LIPID PANEL: CPT

## 2021-09-29 PROCEDURE — 93010 ELECTROCARDIOGRAM REPORT: CPT | Performed by: INTERNAL MEDICINE

## 2021-09-29 PROCEDURE — 93005 ELECTROCARDIOGRAM TRACING: CPT | Performed by: INTERNAL MEDICINE

## 2021-09-29 PROCEDURE — U0004 COV-19 TEST NON-CDC HGH THRU: HCPCS

## 2021-09-29 PROCEDURE — 80048 BASIC METABOLIC PNL TOTAL CA: CPT

## 2021-09-29 PROCEDURE — U0005 INFEC AGEN DETEC AMPLI PROBE: HCPCS

## 2021-09-29 PROCEDURE — 36415 COLL VENOUS BLD VENIPUNCTURE: CPT

## 2021-09-29 PROCEDURE — C9803 HOPD COVID-19 SPEC COLLECT: HCPCS

## 2021-09-29 PROCEDURE — 71046 X-RAY EXAM CHEST 2 VIEWS: CPT

## 2021-09-29 PROCEDURE — 85610 PROTHROMBIN TIME: CPT

## 2021-09-30 ENCOUNTER — ANESTHESIA EVENT (OUTPATIENT)
Dept: CARDIOLOGY | Facility: HOSPITAL | Age: 76
End: 2021-09-30

## 2021-09-30 RX ORDER — SODIUM CHLORIDE 0.9 % (FLUSH) 0.9 %
10 SYRINGE (ML) INJECTION AS NEEDED
Status: CANCELLED | OUTPATIENT
Start: 2021-09-30

## 2021-09-30 RX ORDER — SODIUM CHLORIDE 9 MG/ML
9 INJECTION, SOLUTION INTRAVENOUS CONTINUOUS PRN
Status: CANCELLED | OUTPATIENT
Start: 2021-09-30

## 2021-09-30 RX ORDER — SODIUM CHLORIDE 0.9 % (FLUSH) 0.9 %
10 SYRINGE (ML) INJECTION EVERY 12 HOURS SCHEDULED
Status: CANCELLED | OUTPATIENT
Start: 2021-09-30

## 2021-10-01 ENCOUNTER — HOSPITAL ENCOUNTER (OUTPATIENT)
Dept: CARDIOLOGY | Facility: HOSPITAL | Age: 76
Discharge: HOME | End: 2021-10-01

## 2021-10-01 ENCOUNTER — APPOINTMENT (OUTPATIENT)
Dept: CARDIOLOGY | Facility: HOSPITAL | Age: 76
End: 2021-10-01

## 2021-10-01 ENCOUNTER — ANESTHESIA (OUTPATIENT)
Dept: CARDIOLOGY | Facility: HOSPITAL | Age: 76
End: 2021-10-01

## 2021-10-01 ENCOUNTER — HOSPITAL ENCOUNTER (OUTPATIENT)
Facility: HOSPITAL | Age: 76
Setting detail: HOSPITAL OUTPATIENT SURGERY
Discharge: HOME | End: 2021-10-01
Attending: INTERNAL MEDICINE | Admitting: INTERNAL MEDICINE

## 2021-10-01 VITALS
HEART RATE: 66 BPM | HEIGHT: 67 IN | SYSTOLIC BLOOD PRESSURE: 142 MMHG | DIASTOLIC BLOOD PRESSURE: 73 MMHG | TEMPERATURE: 97.6 F | WEIGHT: 187.17 LBS | RESPIRATION RATE: 14 BRPM | OXYGEN SATURATION: 96 % | BODY MASS INDEX: 29.38 KG/M2

## 2021-10-01 VITALS
HEART RATE: 66 BPM | SYSTOLIC BLOOD PRESSURE: 107 MMHG | DIASTOLIC BLOOD PRESSURE: 68 MMHG | RESPIRATION RATE: 17 BRPM | OXYGEN SATURATION: 99 %

## 2021-10-01 DIAGNOSIS — E78.2 MIXED HYPERLIPIDEMIA: ICD-10-CM

## 2021-10-01 DIAGNOSIS — I10 ESSENTIAL HYPERTENSION: ICD-10-CM

## 2021-10-01 DIAGNOSIS — I35.0 AORTIC STENOSIS, SEVERE: ICD-10-CM

## 2021-10-01 DIAGNOSIS — R00.1 BRADYCARDIA: ICD-10-CM

## 2021-10-01 DIAGNOSIS — R00.1 BRADYCARDIA: Primary | ICD-10-CM

## 2021-10-01 DIAGNOSIS — R01.1 HEART MURMUR: ICD-10-CM

## 2021-10-01 LAB
BH CV XLRA MEAS LEFT BULB PSV: -73.6 CM/SEC
BH CV XLRA MEAS LEFT CCA RATIO VEL: 101 CM/SEC
BH CV XLRA MEAS LEFT DIST CCA EDV: 20.3 CM/SEC
BH CV XLRA MEAS LEFT DIST CCA PSV: 78.5 CM/SEC
BH CV XLRA MEAS LEFT DIST ICA EDV: -39.9 CM/SEC
BH CV XLRA MEAS LEFT DIST ICA PSV: -120 CM/SEC
BH CV XLRA MEAS LEFT ICA RATIO VEL: -128 CM/SEC
BH CV XLRA MEAS LEFT ICA/CCA RATIO: -1.3
BH CV XLRA MEAS LEFT MID ICA EDV: -45.1 CM/SEC
BH CV XLRA MEAS LEFT MID ICA PSV: -128 CM/SEC
BH CV XLRA MEAS LEFT PROX CCA EDV: 24.5 CM/SEC
BH CV XLRA MEAS LEFT PROX CCA PSV: 101 CM/SEC
BH CV XLRA MEAS LEFT PROX ECA PSV: -81.3 CM/SEC
BH CV XLRA MEAS LEFT PROX ICA EDV: -41.6 CM/SEC
BH CV XLRA MEAS LEFT PROX ICA PSV: -123 CM/SEC
BH CV XLRA MEAS LEFT PROX SCLA PSV: 147 CM/SEC
BH CV XLRA MEAS LEFT VERTEBRAL A PSV: -53.1 CM/SEC
BH CV XLRA MEAS RIGHT BULB PSV: -54.7 CM/SEC
BH CV XLRA MEAS RIGHT CCA RATIO VEL: 75.8 CM/SEC
BH CV XLRA MEAS RIGHT DIST CCA EDV: 21.1 CM/SEC
BH CV XLRA MEAS RIGHT DIST CCA PSV: 75.8 CM/SEC
BH CV XLRA MEAS RIGHT DIST ICA EDV: -29.8 CM/SEC
BH CV XLRA MEAS RIGHT DIST ICA PSV: -68.3 CM/SEC
BH CV XLRA MEAS RIGHT ICA RATIO VEL: -73.9 CM/SEC
BH CV XLRA MEAS RIGHT ICA/CCA RATIO: -0.97
BH CV XLRA MEAS RIGHT PROX CCA EDV: 16.8 CM/SEC
BH CV XLRA MEAS RIGHT PROX CCA PSV: 67.7 CM/SEC
BH CV XLRA MEAS RIGHT PROX ECA PSV: -84.5 CM/SEC
BH CV XLRA MEAS RIGHT PROX ICA EDV: -16.8 CM/SEC
BH CV XLRA MEAS RIGHT PROX ICA PSV: -73.9 CM/SEC
BH CV XLRA MEAS RIGHT PROX SCLA PSV: 163 CM/SEC
BH CV XLRA MEAS RIGHT VERTEBRAL A PSV: -31.7 CM/SEC
LEFT ARM BP: NORMAL MMHG
MAXIMAL PREDICTED HEART RATE: 142 BPM
STRESS TARGET HR: 121 BPM

## 2021-10-01 PROCEDURE — 99152 MOD SED SAME PHYS/QHP 5/>YRS: CPT | Performed by: INTERNAL MEDICINE

## 2021-10-01 PROCEDURE — C1894 INTRO/SHEATH, NON-LASER: HCPCS | Performed by: INTERNAL MEDICINE

## 2021-10-01 PROCEDURE — 93312 ECHO TRANSESOPHAGEAL: CPT

## 2021-10-01 PROCEDURE — 25010000002 MIDAZOLAM PER 1 MG: Performed by: INTERNAL MEDICINE

## 2021-10-01 PROCEDURE — 93880 EXTRACRANIAL BILAT STUDY: CPT

## 2021-10-01 PROCEDURE — 93320 DOPPLER ECHO COMPLETE: CPT

## 2021-10-01 PROCEDURE — 93567 NJX CAR CTH SPRVLV AORTGRPHY: CPT | Performed by: INTERNAL MEDICINE

## 2021-10-01 PROCEDURE — 0 IOPAMIDOL PER 1 ML: Performed by: INTERNAL MEDICINE

## 2021-10-01 PROCEDURE — 93312 ECHO TRANSESOPHAGEAL: CPT | Performed by: INTERNAL MEDICINE

## 2021-10-01 PROCEDURE — 93325 DOPPLER ECHO COLOR FLOW MAPG: CPT

## 2021-10-01 PROCEDURE — C1769 GUIDE WIRE: HCPCS | Performed by: INTERNAL MEDICINE

## 2021-10-01 PROCEDURE — 93456 R HRT CORONARY ARTERY ANGIO: CPT | Performed by: INTERNAL MEDICINE

## 2021-10-01 PROCEDURE — 93325 DOPPLER ECHO COLOR FLOW MAPG: CPT | Performed by: INTERNAL MEDICINE

## 2021-10-01 PROCEDURE — 93320 DOPPLER ECHO COMPLETE: CPT | Performed by: INTERNAL MEDICINE

## 2021-10-01 PROCEDURE — 25010000002 FENTANYL CITRATE (PF) 100 MCG/2ML SOLUTION: Performed by: INTERNAL MEDICINE

## 2021-10-01 PROCEDURE — 99153 MOD SED SAME PHYS/QHP EA: CPT | Performed by: INTERNAL MEDICINE

## 2021-10-01 PROCEDURE — 25010000002 PROPOFOL 10 MG/ML EMULSION: Performed by: ANESTHESIOLOGY

## 2021-10-01 RX ORDER — ONDANSETRON 2 MG/ML
4 INJECTION INTRAMUSCULAR; INTRAVENOUS EVERY 6 HOURS PRN
Status: DISCONTINUED | OUTPATIENT
Start: 2021-10-01 | End: 2021-10-01 | Stop reason: HOSPADM

## 2021-10-01 RX ORDER — LIDOCAINE HYDROCHLORIDE 20 MG/ML
INJECTION, SOLUTION INFILTRATION; PERINEURAL AS NEEDED
Status: DISCONTINUED | OUTPATIENT
Start: 2021-10-01 | End: 2021-10-01 | Stop reason: HOSPADM

## 2021-10-01 RX ORDER — SODIUM CHLORIDE 9 MG/ML
60 INJECTION, SOLUTION INTRAVENOUS CONTINUOUS PRN
Status: DISPENSED | OUTPATIENT
Start: 2021-10-01 | End: 2021-10-01

## 2021-10-01 RX ORDER — SODIUM CHLORIDE 9 MG/ML
250 INJECTION, SOLUTION INTRAVENOUS ONCE AS NEEDED
Status: DISCONTINUED | OUTPATIENT
Start: 2021-10-01 | End: 2021-10-01 | Stop reason: HOSPADM

## 2021-10-01 RX ORDER — SODIUM CHLORIDE 0.9 % (FLUSH) 0.9 %
10 SYRINGE (ML) INJECTION AS NEEDED
Status: DISCONTINUED | OUTPATIENT
Start: 2021-10-01 | End: 2021-10-01 | Stop reason: HOSPADM

## 2021-10-01 RX ORDER — ACETAMINOPHEN 325 MG/1
650 TABLET ORAL EVERY 4 HOURS PRN
Status: DISCONTINUED | OUTPATIENT
Start: 2021-10-01 | End: 2021-10-01 | Stop reason: HOSPADM

## 2021-10-01 RX ORDER — PROPOFOL 10 MG/ML
VIAL (ML) INTRAVENOUS AS NEEDED
Status: DISCONTINUED | OUTPATIENT
Start: 2021-10-01 | End: 2021-10-01 | Stop reason: SURG

## 2021-10-01 RX ORDER — SODIUM CHLORIDE 0.9 % (FLUSH) 0.9 %
10 SYRINGE (ML) INJECTION EVERY 12 HOURS SCHEDULED
Status: DISCONTINUED | OUTPATIENT
Start: 2021-10-01 | End: 2021-10-01

## 2021-10-01 RX ORDER — DIPHENHYDRAMINE HCL 25 MG
25 CAPSULE ORAL EVERY 6 HOURS PRN
Status: DISCONTINUED | OUTPATIENT
Start: 2021-10-01 | End: 2021-10-01 | Stop reason: HOSPADM

## 2021-10-01 RX ORDER — MIDAZOLAM HYDROCHLORIDE 1 MG/ML
INJECTION INTRAMUSCULAR; INTRAVENOUS AS NEEDED
Status: DISCONTINUED | OUTPATIENT
Start: 2021-10-01 | End: 2021-10-01 | Stop reason: HOSPADM

## 2021-10-01 RX ORDER — FENTANYL CITRATE 50 UG/ML
INJECTION, SOLUTION INTRAMUSCULAR; INTRAVENOUS AS NEEDED
Status: DISCONTINUED | OUTPATIENT
Start: 2021-10-01 | End: 2021-10-01 | Stop reason: HOSPADM

## 2021-10-01 RX ORDER — ONDANSETRON 4 MG/1
4 TABLET, FILM COATED ORAL EVERY 6 HOURS PRN
Status: DISCONTINUED | OUTPATIENT
Start: 2021-10-01 | End: 2021-10-01 | Stop reason: HOSPADM

## 2021-10-01 RX ADMIN — PROPOFOL 100 MG: 10 INJECTION, EMULSION INTRAVENOUS at 11:22

## 2021-10-01 RX ADMIN — PROPOFOL 50 MG: 10 INJECTION, EMULSION INTRAVENOUS at 11:27

## 2021-10-01 RX ADMIN — SODIUM CHLORIDE 9 ML/HR: 9 INJECTION, SOLUTION INTRAVENOUS at 10:59

## 2021-10-01 RX ADMIN — Medication 600 MG: at 11:47

## 2021-10-01 RX ADMIN — PROPOFOL 50 MG: 10 INJECTION, EMULSION INTRAVENOUS at 11:32

## 2021-10-01 NOTE — INTERVAL H&P NOTE
H&P reviewed. The patient was examined and there are no changes to the H&P.     Paramedian Forehead Flap Text: A decision was made to reconstruct the defect utilizing an interpolation axial flap and a staged reconstruction.  A telfa template was made of the defect.  This telfa template was then used to outline the paramedian forehead pedicle flap.  The donor area for the pedicle flap was then injected with anesthesia.  The flap was excised through the skin and subcutaneous tissue down to the layer of the underlying musculature.  The pedicle flap was carefully excised within this deep plane to maintain its blood supply.  The edges of the donor site were undermined.   The donor site was closed in a primary fashion.  The pedicle was then rotated into position and sutured.  Once the tube was sutured into place, adequate blood supply was confirmed with blanching and refill.  The pedicle was then wrapped with xeroform gauze and dressed appropriately with a telfa and gauze bandage to ensure continued blood supply and protect the attached pedicle.

## 2021-10-01 NOTE — ANESTHESIA POSTPROCEDURE EVALUATION
Patient: Luis Emery    Procedure Summary     Date: 10/01/21 Room / Location: [facility]    Anesthesia Start: 1120 Anesthesia Stop: 1136    Procedure: ADULT TRANSESOPHAGEAL ECHO (BECKI) W/ CONT IF NECESSARY PER PROTOCOL Diagnosis:       Aortic stenosis, severe      Heart murmur      Essential hypertension      Bradycardia      Mixed hyperlipidemia      (Valvular Disease)    Scheduled Providers: Luis Angel Frederick MD Provider: Salomon Jeter MD    Anesthesia Type: MAC ASA Status: 4          Anesthesia Type: MAC    Vitals  No vitals data found for the desired time range.          Post Anesthesia Care and Evaluation    Patient location during evaluation: bedside  Patient participation: complete - patient participated  Level of consciousness: awake and alert  Pain score: 1  Pain management: adequate  Airway patency: patent  Anesthetic complications: No anesthetic complications  PONV Status: none  Cardiovascular status: acceptable  Respiratory status: acceptable  Hydration status: acceptable  Post Neuraxial Block status: Motor and sensory function returned to baseline

## 2021-10-01 NOTE — ANESTHESIA PREPROCEDURE EVALUATION
Anesthesia Evaluation     Patient summary reviewed and Nursing notes reviewed   NPO Solid Status: > 6 hours  NPO Liquid Status: > 6 hours           Airway   Mallampati: II  TM distance: >3 FB  Neck ROM: full  No difficulty expected  Dental - normal exam     Pulmonary - normal exam    breath sounds clear to auscultation  (+) recent URI,   Cardiovascular - normal exam    ECG reviewed  Rhythm: regular  Rate: normal    (+) hypertension, valvular problems/murmurs AS, hyperlipidemia,       Neuro/Psych- negative ROS  GI/Hepatic/Renal/Endo    (+)  GERD,      Musculoskeletal (-) negative ROS    Abdominal  - normal exam    Abdomen: soft.  Bowel sounds: normal.   Substance History - negative use     OB/GYN negative ob/gyn ROS         Other - negative ROS                       Anesthesia Plan    ASA 4     MAC     intravenous induction     Anesthetic plan, all risks, benefits, and alternatives have been provided, discussed and informed consent has been obtained with: patient.

## 2021-10-04 DIAGNOSIS — I10 ESSENTIAL HYPERTENSION: ICD-10-CM

## 2021-10-04 RX ORDER — LOSARTAN POTASSIUM 100 MG/1
TABLET ORAL
Qty: 90 TABLET | Refills: 0 | Status: SHIPPED | OUTPATIENT
Start: 2021-10-04 | End: 2021-11-29

## 2021-10-04 RX ORDER — HYDROCHLOROTHIAZIDE 25 MG/1
TABLET ORAL
Qty: 90 TABLET | Refills: 0 | Status: SHIPPED | OUTPATIENT
Start: 2021-10-04 | End: 2021-11-03 | Stop reason: HOSPADM

## 2021-10-14 ENCOUNTER — PATIENT ROUNDING (BHMG ONLY) (OUTPATIENT)
Dept: CARDIAC SURGERY | Facility: CLINIC | Age: 76
End: 2021-10-14

## 2021-10-14 ENCOUNTER — OFFICE VISIT (OUTPATIENT)
Dept: CARDIAC SURGERY | Facility: CLINIC | Age: 76
End: 2021-10-14

## 2021-10-14 VITALS
SYSTOLIC BLOOD PRESSURE: 152 MMHG | OXYGEN SATURATION: 98 % | HEART RATE: 62 BPM | BODY MASS INDEX: 29.51 KG/M2 | RESPIRATION RATE: 20 BRPM | DIASTOLIC BLOOD PRESSURE: 89 MMHG | WEIGHT: 188 LBS | HEIGHT: 67 IN | TEMPERATURE: 97.8 F

## 2021-10-14 DIAGNOSIS — I35.0 AORTIC STENOSIS, SEVERE: Primary | ICD-10-CM

## 2021-10-14 DIAGNOSIS — I35.0 AORTIC STENOSIS, SEVERE: ICD-10-CM

## 2021-10-14 DIAGNOSIS — E78.2 MIXED HYPERLIPIDEMIA: ICD-10-CM

## 2021-10-14 DIAGNOSIS — I10 ESSENTIAL HYPERTENSION: Primary | ICD-10-CM

## 2021-10-14 DIAGNOSIS — K21.9 GASTROESOPHAGEAL REFLUX DISEASE WITHOUT ESOPHAGITIS: ICD-10-CM

## 2021-10-14 PROCEDURE — 99204 OFFICE O/P NEW MOD 45 MIN: CPT | Performed by: THORACIC SURGERY (CARDIOTHORACIC VASCULAR SURGERY)

## 2021-10-14 NOTE — PROGRESS NOTES
October 14, 2021    Hello, may I speak with Luis Emery?    My name is Holly Peterson      I am  with [facility]  [address] [place] [address]  [place] [address] [phone].    Before we get started may I verify your date of birth? 1945    I am calling to officially welcome you to our practice and ask about your recent visit. Is this a good time to talk? yes    Tell me about your visit with us. What things went well?  Everything was fine just waiting for my scan.       We're always looking for ways to make our patients' experiences even better. Do you have recommendations on ways we may improve?  no unless there is a magic pill    Overall were you satisfied with your first visit to our practice? yes       I appreciate you taking the time to speak with me today. Is there anything else I can do for you? no      Thank you, and have a great day.

## 2021-10-17 LAB
MAXIMAL PREDICTED HEART RATE: 142 BPM
STRESS TARGET HR: 121 BPM

## 2021-10-17 NOTE — PROGRESS NOTES
10/17/2021    Seen on 10/14/2021    Chief Complaint     Dizziness, evaluation of aortic stenosis    History of Present Illness:       Dear Yeimi and Colleagues,  It was nice to see Luis Emery in consultation at your request. He is a 76 y.o. male with a history of prostatic cancer, hyperlipidemia, GERD, hypertension urinary frequency who has developed progressive dyspnea with exertion especially with going up stairs and dizziness.  It is unclear whether the dizziness is postural because sound that he has a well exerting.  His shortness of breath has been progressive and has gotten worse over the last few weeks. He denies richardson syncope, TIA, orthopnea or PND, lower extremity edema or chest pain.  He had a 2D echocardiogram that showed severe aortic stenosis with significantly thickened and calcific leaflets and a mean gradient of 41 mmHg, peak gradient of 56 and aortic valve area of 0.5 cm².  His peak velocity was 3.7 m/s at BECKI follow and showed also severe aortic stenosis with an aortic valve area 0.8 cm², mild regurgitation and elevated gradients.  The patient had a cardiac cath that showed no significant coronary artery disease and there was an inability to close the aortic valve therefore the aortogram gradients were not measured.  He has no family history of aneurysms, dissections or connective tissue disorders.  He denies a history of scarlet fever or rheumatic fever or endocarditis.  He is here for a surgical opinion.      Patient Active Problem List   Diagnosis   • Personal history of prostate cancer   • Onychomycosis   • Increased frequency of urination   • Hyperlipidemia   • History of measles   • Gastroesophageal reflux disease without esophagitis   • Essential hypertension   • Screening PSA (prostate specific antigen)   • Medicare annual wellness visit, subsequent   • Chronic angle-closure glaucoma   • Anemia   • Heart murmur   • Aortic stenosis, severe   • Full code status   • Bradycardia       Past  Medical History:   Diagnosis Date   • Abdominal pain     Impression: s/p recent cholecystecomy clinically improved   • Acute recurrent frontal sinusitis    • Acute upper respiratory infection    • Anemia     Impression: stable, Hgb 12.1   • Cancer (HCC)     prostate- removed surgically   • Chronic angle-closure glaucoma    • DNR (do not resuscitate)    • Essential hypertension    • Gastroesophageal reflux disease without esophagitis     Impression: Stable on current meds (alternating omeprazole and ranitidine). EGD 1/2017.   • Heart murmur    • History of measles    • Hyperlipidemia    • Medicare annual wellness visit, subsequent    • Onychomycosis     Impression: Treatment options discussed. Prescription(s) as below. Medication(s) usage and side effects discussed.   • Overweight    • Personal history of malignant neoplasm of prostate    • Screening for depression     Negative Depression Screening (4 or less) ()   • Screening PSA (prostate specific antigen)     Impression: With history of prostate cancer. New urinary symptoms. Recheck PSA.   • Urinary frequency     Impression: Prescription(s) as below. Medication(s) usage and side effects discussed.       Past Surgical History:   Procedure Laterality Date   • ANKLE TENDON REPAIR  July 5th, 2017    Dr. Armijo   • CARDIAC CATHETERIZATION  2005    no stents   • CARDIAC CATHETERIZATION N/A 10/1/2021    Procedure: Right and Left Heart Cath with Coronar Angiography;  Surgeon: Luis Angel Frederick MD;  Location: [facility] CATH INVASIVE LOCATION;  Service: Cardiovascular;  Laterality: N/A;   • CARDIAC CATHETERIZATION N/A 10/1/2021    Procedure: Aortic root aortogram;  Surgeon: Luis Angel Frederick MD;  Location: [facility] CATH INVASIVE LOCATION;  Service: Cardiovascular;  Laterality: N/A;   • CHOLECYSTECTOMY  03/2010    [facility].   • PROSTATECTOMY  01/1997    [facility].       Allergies   Allergen Reactions   • Nitroglycerin Unknown (See Comments)      chills         Current Outpatient Medications:   •  atorvastatin (LIPITOR) 20 MG tablet, Take 1 tablet by mouth once daily, Disp: 90 tablet, Rfl: 3  •  glucosamine-chondroitin 500-400 MG per tablet, Take 1 tablet by mouth Daily., Disp: , Rfl:   •  hydroCHLOROthiazide (HYDRODIURIL) 25 MG tablet, Take 1 tablet by mouth once daily, Disp: 90 tablet, Rfl: 0  •  Loratadine (CLARITIN) 10 MG capsule, Take 1 capsule by mouth Daily., Disp: , Rfl:   •  losartan (COZAAR) 100 MG tablet, Take 1 tablet by mouth once daily, Disp: 90 tablet, Rfl: 0  •  methylcellulose, Laxative, (CITRUCEL) 500 MG tablet tablet, Take 1 tablet by mouth 2 (two) times a day., Disp: , Rfl:   •  metoprolol succinate XL (TOPROL-XL) 100 MG 24 hr tablet, Take 1 tablet by mouth once daily (Patient taking differently: Take 50 mg by mouth Daily.), Disp: 90 tablet, Rfl: 2  •  Multiple Vitamin (MULTIVITAMINS PO), Take 1 capsule by mouth Daily., Disp: , Rfl:   •  Omega-3 Fatty Acids (FISH OIL CONCENTRATE) 300 MG capsule, Take 1 capsule by mouth Daily., Disp: , Rfl:   •  omeprazole (priLOSEC) 20 MG capsule, Take 1 capsule by mouth once daily, Disp: 90 capsule, Rfl: 2    Social History     Socioeconomic History   • Marital status:    Tobacco Use   • Smoking status: Never Smoker   • Smokeless tobacco: Never Used   Vaping Use   • Vaping Use: Never used   Substance and Sexual Activity   • Alcohol use: No   • Drug use: Never   • Sexual activity: Defer       Family History   Problem Relation Age of Onset   • Testicular cancer Father    • Coronary artery disease Father    • Hypertension Other        Review of Systems   Constitutional: Positive for fatigue.   Respiratory: Positive for shortness of breath.    Cardiovascular: Negative for chest pain, palpitations and leg swelling.   Neurological: Positive for dizziness. Negative for seizures, syncope and weakness.   All other systems reviewed and are negative.      Physical Exam:    Vital Signs:  Weight: 85.3 kg  (188 lb)   Body mass index is 29.23 kg/m².  Temp: 97.8 °F (36.6 °C)   Heart Rate: 62   BP: 152/89     Constitutional:       Appearance: Well-developed.   Eyes:      Conjunctiva/sclera: Conjunctivae normal.      Pupils: Pupils are equal, round, and reactive to light.   HENT:      Head: Normocephalic and atraumatic.      Nose: Nose normal.   Neck:      Thyroid: No thyromegaly.      Vascular: No JVD.      Lymphadenopathy: No cervical adenopathy.   Pulmonary:      Effort: Pulmonary effort is normal.      Breath sounds: Normal breath sounds. No rales.   Cardiovascular:      PMI at left midclavicular line. Normal rate. Regular rhythm.      Murmurs: There is a grade 3/6 harsh midsystolic murmur at the URSB, radiating to the neck.      No gallop. No click. No rub.   Pulses:     Intact distal pulses. No decreased pulses.   Edema:     Peripheral edema absent.   Abdominal:      General: Bowel sounds are normal. There is no distension.      Palpations: Abdomen is soft. There is no abdominal mass.      Tenderness: There is no abdominal tenderness.   Musculoskeletal: Normal range of motion.         General: No tenderness or deformity.      Cervical back: Normal range of motion and neck supple. Skin:     General: Skin is warm and dry.      Findings: No erythema or rash.   Neurological:      Mental Status: Alert and oriented to person, place, and time.      Deep Tendon Reflexes: Reflexes are normal and symmetric.   Psychiatric:         Behavior: Behavior normal.          Assessment:     Problems Addressed this Visit        Cardiac and Vasculature    Hyperlipidemia    Essential hypertension - Primary    Aortic stenosis, severe       Gastrointestinal Abdominal     Gastroesophageal reflux disease without esophagitis      Diagnoses       Codes Comments    Essential hypertension    -  Primary ICD-10-CM: I10  ICD-9-CM: 401.9     Mixed hyperlipidemia     ICD-10-CM: E78.2  ICD-9-CM: 272.2     Aortic stenosis, severe     ICD-10-CM:  I35.0  ICD-9-CM: 424.1     Gastroesophageal reflux disease without esophagitis     ICD-10-CM: K21.9  ICD-9-CM: 530.81           Recommendation/Plan:     Severe nonrheumatic calcific aortic stenosis with documented gradients and progression of symptoms.  He has heart failure symptoms class II and exertional dyspnea.  I recommend TAVR over SAVR given his age, prostatic cancer and low to moderate surgical risk.  I have discussed with the patient that he should be a surgical rescue if need be.  He will need to have a TAVR CTA to further address whether he has good femoral access and details of the aortic valve annulus for implant ability.  I have discussed the risks, benefits and terms of the procedure and he wishes to proceed.  He would see a TAVR cardiologist per Dr. Frederick and as a team will make the final decision regarding his TAVR.    Thank you for allowing me to participate in his care.    Regards,    Preston Hager M.D  I spent over 45 minutes in reviewing the records, examining the patient, reviewing and interpreting the cardiac cath, BECKI and 2D echocardiogram myself and discussing the findings and options with the patient, discussing the nature and explained the patient the procedure to be considered and documenting in the electronic record

## 2021-10-25 ENCOUNTER — HOSPITAL ENCOUNTER (OUTPATIENT)
Dept: CARDIOLOGY | Facility: HOSPITAL | Age: 76
Discharge: HOME | End: 2021-10-25

## 2021-10-25 ENCOUNTER — HOSPITAL ENCOUNTER (OUTPATIENT)
Dept: CT IMAGING | Facility: HOSPITAL | Age: 76
Discharge: HOME | End: 2021-10-25

## 2021-10-25 ENCOUNTER — OFFICE VISIT (OUTPATIENT)
Dept: CARDIOLOGY | Facility: CLINIC | Age: 76
End: 2021-10-25

## 2021-10-25 VITALS
OXYGEN SATURATION: 98 % | WEIGHT: 191.6 LBS | SYSTOLIC BLOOD PRESSURE: 138 MMHG | BODY MASS INDEX: 30.07 KG/M2 | DIASTOLIC BLOOD PRESSURE: 82 MMHG | HEIGHT: 67 IN | HEART RATE: 64 BPM

## 2021-10-25 DIAGNOSIS — I35.0 AORTIC STENOSIS, SEVERE: ICD-10-CM

## 2021-10-25 DIAGNOSIS — I10 ESSENTIAL HYPERTENSION: ICD-10-CM

## 2021-10-25 DIAGNOSIS — E78.2 MIXED HYPERLIPIDEMIA: ICD-10-CM

## 2021-10-25 DIAGNOSIS — I35.0 AORTIC STENOSIS, SEVERE: Primary | ICD-10-CM

## 2021-10-25 LAB
CREAT BLDA-MCNC: 1.4 MG/DL (ref 0.6–1.3)
QT INTERVAL: 385 MS

## 2021-10-25 PROCEDURE — 74174 CTA ABD&PLVS W/CONTRAST: CPT

## 2021-10-25 PROCEDURE — 0 IOPAMIDOL PER 1 ML: Performed by: THORACIC SURGERY (CARDIOTHORACIC VASCULAR SURGERY)

## 2021-10-25 PROCEDURE — 99214 OFFICE O/P EST MOD 30 MIN: CPT | Performed by: INTERNAL MEDICINE

## 2021-10-25 PROCEDURE — 71275 CT ANGIOGRAPHY CHEST: CPT

## 2021-10-25 PROCEDURE — 93010 ELECTROCARDIOGRAM REPORT: CPT | Performed by: INTERNAL MEDICINE

## 2021-10-25 PROCEDURE — 93005 ELECTROCARDIOGRAM TRACING: CPT | Performed by: THORACIC SURGERY (CARDIOTHORACIC VASCULAR SURGERY)

## 2021-10-25 PROCEDURE — 82565 ASSAY OF CREATININE: CPT

## 2021-10-25 RX ADMIN — IOPAMIDOL 95 ML: 755 INJECTION, SOLUTION INTRAVENOUS at 08:43

## 2021-10-25 NOTE — PROGRESS NOTES
Luis Emery  1945  Date of Office Visit: 10/25/21  Encounter Provider: Manpreet Vargas MD  Place of Service: [facility] CARDIOLOGY      CHIEF COMPLAINT:  Severe degenerative aortic valve stenosis.  Nonrheumatic next    HISTORY OF PRESENT ILLNESS:  76-year-old male previously evaluated by Dr. Hager and follows with Dr. Yeimi Frederick.  He is a very pleasant 76-year-old male with medical history of essential hypertension, gastroesophageal reflux disease and progressive dyspnea on exertion who presents to me secondary to severe aortic valve stenosis.  I have reviewed his prior studies including transesophageal echocardiogram, TTE, catheterization and today his CTA transcatheter aortic valve replacement protocol.  He states that he has had no issues with chest pain or syncope but occasionally will have lightheadedness when he is very active.  He also complains of dyspnea that is moderate in intensity and presents when he is walking upstairs.  He has previously underwent transthoracic echocardiogram with severe AS with calcified leaflets that are restricted and a mean gradient of 41 mmHg with a valve area of 0.5 cm².  He had a transesophageal echocardiogram once again with a decrease to valve area of 0.8 cm² and elevated gradients similar to the transthoracic echocardiogram.  Coronary angiography showed no coronary artery disease and he is here today for evaluation.        Review of Systems   Constitutional: Negative for fever and malaise/fatigue.   HENT: Negative for nosebleeds and sore throat.    Eyes: Negative for blurred vision and double vision.   Cardiovascular: Negative for chest pain, claudication, palpitations and syncope.   Respiratory: Negative for cough, shortness of breath and snoring.    Endocrine: Negative for cold intolerance, heat intolerance and polydipsia.   Skin: Negative for itching, poor wound healing and rash.   Musculoskeletal: Negative for joint pain, joint  swelling, muscle weakness and myalgias.   Gastrointestinal: Negative for abdominal pain, melena, nausea and vomiting.   Neurological: Negative for light-headedness, loss of balance, seizures, vertigo and weakness.   Psychiatric/Behavioral: Negative for altered mental status and depression.       Past Medical History:   Diagnosis Date   • Abdominal pain     Impression: s/p recent cholecystecomy clinically improved   • Acute recurrent frontal sinusitis    • Acute upper respiratory infection    • Anemia     Impression: stable, Hgb 12.1   • Cancer (HCC)     prostate- removed surgically   • Chronic angle-closure glaucoma    • DNR (do not resuscitate)    • Essential hypertension    • Gastroesophageal reflux disease without esophagitis     Impression: Stable on current meds (alternating omeprazole and ranitidine). EGD 1/2017.   • Heart murmur    • History of measles    • Hyperlipidemia    • Medicare annual wellness visit, subsequent    • Onychomycosis     Impression: Treatment options discussed. Prescription(s) as below. Medication(s) usage and side effects discussed.   • Overweight    • Personal history of malignant neoplasm of prostate    • Screening for depression     Negative Depression Screening (4 or less) ()   • Screening PSA (prostate specific antigen)     Impression: With history of prostate cancer. New urinary symptoms. Recheck PSA.   • Urinary frequency     Impression: Prescription(s) as below. Medication(s) usage and side effects discussed.       The following portions of the patient's history were reviewed and updated as appropriate: Social history , Family history and Surgical history     Current Outpatient Medications on File Prior to Visit   Medication Sig Dispense Refill   • atorvastatin (LIPITOR) 20 MG tablet Take 1 tablet by mouth once daily 90 tablet 3   • glucosamine-chondroitin 500-400 MG per tablet Take 1 tablet by mouth Daily.     • hydroCHLOROthiazide (HYDRODIURIL) 25 MG tablet Take 1 tablet  "by mouth once daily 90 tablet 0   • Loratadine (CLARITIN) 10 MG capsule Take 1 capsule by mouth Daily.     • losartan (COZAAR) 100 MG tablet Take 1 tablet by mouth once daily 90 tablet 0   • methylcellulose, Laxative, (CITRUCEL) 500 MG tablet tablet Take 1 tablet by mouth 2 (two) times a day.     • metoprolol succinate XL (TOPROL-XL) 100 MG 24 hr tablet Take 1 tablet by mouth once daily (Patient taking differently: Take 50 mg by mouth Daily.) 90 tablet 2   • Multiple Vitamin (MULTIVITAMINS PO) Take 1 capsule by mouth Daily.     • Omega-3 Fatty Acids (FISH OIL CONCENTRATE) 300 MG capsule Take 1 capsule by mouth Daily.     • omeprazole (priLOSEC) 20 MG capsule Take 1 capsule by mouth once daily 90 capsule 2     No current facility-administered medications on file prior to visit.       Allergies   Allergen Reactions   • Nitroglycerin Unknown (See Comments)     chills       Vitals:    10/25/21 1303   BP: 138/82   BP Location: Left arm   Patient Position: Sitting   Cuff Size: Adult   Pulse: 64   SpO2: 98%   Weight: 86.9 kg (191 lb 9.6 oz)   Height: 170.2 cm (67\")     Body mass index is 30.01 kg/m².   Constitutional:       Appearance: Well-developed.   Eyes:      General: No scleral icterus.     Conjunctiva/sclera: Conjunctivae normal.   HENT:      Head: Normocephalic and atraumatic.   Neck:      Thyroid: No thyromegaly.      Vascular: Normal carotid pulses. No carotid bruit, hepatojugular reflux or JVD.      Trachea: No tracheal deviation.   Pulmonary:      Effort: No respiratory distress.      Breath sounds: Normal breath sounds. No decreased breath sounds. No wheezing. No rhonchi. No rales.   Chest:      Chest wall: Not tender to palpatation.   Cardiovascular:      Normal rate. Regular rhythm.      Murmurs: There is a grade 3/6 mid to late systolic murmur.      No gallop.   Pulses:     Carotid: 2+ bilaterally.     Radial: 2+ bilaterally.     Femoral: 2+ bilaterally.     Dorsalis pedis: 2+ bilaterally.     Posterior " tibial: 2+ bilaterally.  Edema:     Peripheral edema absent.   Abdominal:      General: Bowel sounds are normal. There is no distension.      Palpations: Abdomen is soft.      Tenderness: There is no abdominal tenderness.   Musculoskeletal:         General: No deformity.      Cervical back: Normal range of motion and neck supple. Skin:     Findings: No erythema or rash.   Neurological:      Mental Status: Alert and oriented to person, place, and time.      Sensory: No sensory deficit.   Psychiatric:         Behavior: Behavior normal.         Lab Results   Component Value Date    WBC 8.44 09/29/2021    HGB 14.4 09/29/2021    HCT 42.2 09/29/2021    MCV 94.8 09/29/2021     09/29/2021       Lab Results   Component Value Date    GLUCOSE 128 (H) 09/29/2021    BUN 20 09/29/2021    CREATININE 1.32 (H) 09/29/2021    EGFRIFNONA 52 (L) 09/29/2021    EGFRIFAFRI 60 11/09/2020    BCR 15.2 09/29/2021    K 4.2 09/29/2021    CO2 28.1 09/29/2021    CALCIUM 10.1 09/29/2021    PROTENTOTREF 6.8 11/09/2020    ALBUMIN 4.1 11/09/2020    LABIL2 1.5 11/09/2020    AST 22 11/09/2020    ALT 18 11/09/2020       Lab Results   Component Value Date    GLUCOSE 128 (H) 09/29/2021    CALCIUM 10.1 09/29/2021     09/29/2021    K 4.2 09/29/2021    CO2 28.1 09/29/2021     09/29/2021    BUN 20 09/29/2021    CREATININE 1.32 (H) 09/29/2021    EGFRIFAFRI 60 11/09/2020    EGFRIFNONA 52 (L) 09/29/2021    BCR 15.2 09/29/2021    ANIONGAP 8.9 09/29/2021       Lab Results   Component Value Date    CHOL 106 09/29/2021    CHLPL 124 11/09/2020    TRIG 102 09/29/2021    HDL 38 (L) 09/29/2021    LDL 49 09/29/2021       Procedures     10/1/2021     No evidence for pulmonary hypertension is present.     Left ventricle angiogram was not performed due to inability to cross the aortic valve.  Aortogram revealed normal-sized aorta with significantly reduced aortic valve motion.  No evidence for aortic regurgitation is present.     Left main coronary artery  is normal.  Left anterior descending artery normal.  Circumflex coronary artery is normal.  Right coronary artery is normal (large and dominant)                Results for orders placed during the hospital encounter of 10/01/21    Adult Transesophageal Echo (BECKI) W/ Cont if Necessary Per Protocol    Interpretation Summary  Date of study  10/1/2021.    Indications  Significant aortic valve stenosis    Procedure performed  Transesophageal echocardiogram  Doppler study (color Doppler study)    Procedure  Anesthesia was provided by anesthesiologist with intravenous Diprivan.  BECKI probe could be passed without difficulty.  Patient tolerated the procedure well.  No complications were noted.    Results  Technically satisfactory study.  Mitral valve is structurally normal.  Mild mitral regurgitation is present.  Aortic valve is significantly thickened and tricuspid with decreased opening motion.  Valve area is 0.8 cm².  Mild aortic regurgitation.  Tricuspid valve is normal.  Left atrium is normal in size.  Left ventricle is normal in size and contractility with ejection fraction of 60%.  Right atrium and right ventricle are normal.  Atrial septum is intact.  Left atrial appendage is normal without any clot.  No pericardial effusion or intracardiac thrombus is seen.  Aorta is normal.    Impression  Significant aortic valve stenosis with valve area of 0.8 cm².  Left ventricle is normal in size and contractility.  Ejection fraction 60%.  Mild mitral and aortic regurgitation is present.  No pericardial effusion or intracardiac thrombus is seen.      DISCUSSION/SUMMARY very pleasant 76-year-old male with a medical history of severe degenerative aortic valve stenosis, dyspnea on exertion, hypertension and gastroesophageal reflux disease who presents to me secondary to severe AS.  He has dyspnea on exertion and occasional lightheadedness.  He has not had any issues with angina.  He clearly has severe degenerative aortic valve  stenosis.  I do think this is symptomatic with New York Heart Association class II symptoms.    1.  Severe degenerative aortic valve stenosis: Nonrheumatic  -TTE, BECKI consistent with severe AS based on gradients and valve area.  -Ejection fraction is preserved.  -I have recommended transcatheter aortic valve replacement with a left transfemoral approach based on the initial review of imaging.  We will process these images a little bit better and reassess for right sided access as well.  -Think the pacemaker risk is pretty low in this scenario secondary to the narrow QRS and no significant issues with heart block.    2.  Essential hypertension: Well-controlled.  Continue losartan and metoprolol succinate therapy at current dose.  No hyperkalemia or renal insufficiency noted on evaluation of lab work.

## 2021-10-27 DIAGNOSIS — I35.0 AORTIC STENOSIS, SEVERE: Primary | ICD-10-CM

## 2021-10-28 ENCOUNTER — PREP FOR SURGERY (OUTPATIENT)
Dept: OTHER | Facility: HOSPITAL | Age: 76
End: 2021-10-28

## 2021-10-28 DIAGNOSIS — R79.1 ABNORMAL COAGULATION PROFILE: ICD-10-CM

## 2021-10-28 DIAGNOSIS — R79.9 ABNORMAL FINDING OF BLOOD CHEMISTRY, UNSPECIFIED: ICD-10-CM

## 2021-10-28 DIAGNOSIS — I11.0 HYPERTENSIVE HEART DISEASE WITH HEART FAILURE (HCC): ICD-10-CM

## 2021-10-28 DIAGNOSIS — I35.0 AORTIC VALVE STENOSIS, ETIOLOGY OF CARDIAC VALVE DISEASE UNSPECIFIED: Primary | ICD-10-CM

## 2021-10-28 RX ORDER — CHLORHEXIDINE GLUCONATE 0.12 MG/ML
15 RINSE ORAL EVERY 12 HOURS
Status: CANCELLED | OUTPATIENT
Start: 2021-10-28 | End: 2021-10-29

## 2021-10-28 RX ORDER — CEFAZOLIN SODIUM 2 G/100ML
2 INJECTION, SOLUTION INTRAVENOUS
Status: CANCELLED | OUTPATIENT
Start: 2021-11-03 | End: 2021-11-04

## 2021-10-28 RX ORDER — CHLORHEXIDINE GLUCONATE 0.12 MG/ML
15 RINSE ORAL ONCE
Status: CANCELLED | OUTPATIENT
Start: 2021-11-02 | End: 2021-10-28

## 2021-10-29 ENCOUNTER — PRE-ADMISSION TESTING (OUTPATIENT)
Dept: PREADMISSION TESTING | Facility: HOSPITAL | Age: 76
End: 2021-10-29

## 2021-10-29 ENCOUNTER — ANESTHESIA EVENT (OUTPATIENT)
Dept: PERIOP | Facility: HOSPITAL | Age: 76
End: 2021-10-29

## 2021-10-29 ENCOUNTER — HOSPITAL ENCOUNTER (OUTPATIENT)
Dept: GENERAL RADIOLOGY | Facility: HOSPITAL | Age: 76
Discharge: HOME | End: 2021-10-29

## 2021-10-29 VITALS
OXYGEN SATURATION: 95 % | TEMPERATURE: 97.4 F | RESPIRATION RATE: 16 BRPM | WEIGHT: 188 LBS | DIASTOLIC BLOOD PRESSURE: 91 MMHG | SYSTOLIC BLOOD PRESSURE: 154 MMHG | HEART RATE: 88 BPM | BODY MASS INDEX: 29.51 KG/M2 | HEIGHT: 67 IN

## 2021-10-29 DIAGNOSIS — I11.0 HYPERTENSIVE HEART DISEASE WITH HEART FAILURE (HCC): ICD-10-CM

## 2021-10-29 DIAGNOSIS — R79.9 ABNORMAL FINDING OF BLOOD CHEMISTRY, UNSPECIFIED: ICD-10-CM

## 2021-10-29 DIAGNOSIS — I35.0 AORTIC VALVE STENOSIS, ETIOLOGY OF CARDIAC VALVE DISEASE UNSPECIFIED: ICD-10-CM

## 2021-10-29 DIAGNOSIS — R79.1 ABNORMAL COAGULATION PROFILE: ICD-10-CM

## 2021-10-29 LAB
ABO GROUP BLD: NORMAL
ALBUMIN SERPL-MCNC: 4.3 G/DL (ref 3.5–5.2)
ALBUMIN/GLOB SERPL: 1.6 G/DL
ALP SERPL-CCNC: 81 U/L (ref 39–117)
ALT SERPL W P-5'-P-CCNC: 23 U/L (ref 1–41)
ANION GAP SERPL CALCULATED.3IONS-SCNC: 8.1 MMOL/L (ref 5–15)
APTT PPP: 32.2 SECONDS (ref 22.7–35.4)
AST SERPL-CCNC: 27 U/L (ref 1–40)
BASOPHILS # BLD AUTO: 0.04 10*3/MM3 (ref 0–0.2)
BASOPHILS NFR BLD AUTO: 0.7 % (ref 0–1.5)
BILIRUB SERPL-MCNC: 1.1 MG/DL (ref 0–1.2)
BILIRUB UR QL STRIP: NEGATIVE
BLD GP AB SCN SERPL QL: NEGATIVE
BUN SERPL-MCNC: 21 MG/DL (ref 8–23)
BUN/CREAT SERPL: 18.8 (ref 7–25)
CALCIUM SPEC-SCNC: 9.5 MG/DL (ref 8.6–10.5)
CHLORIDE SERPL-SCNC: 102 MMOL/L (ref 98–107)
CLARITY UR: CLEAR
CLOSE TME COLL+ADP + EPINEP PNL BLD: 97 % (ref 86–100)
CO2 SERPL-SCNC: 27.9 MMOL/L (ref 22–29)
COLOR UR: YELLOW
CREAT SERPL-MCNC: 1.12 MG/DL (ref 0.76–1.27)
DEPRECATED RDW RBC AUTO: 42.7 FL (ref 37–54)
EOSINOPHIL # BLD AUTO: 0.48 10*3/MM3 (ref 0–0.4)
EOSINOPHIL NFR BLD AUTO: 8.4 % (ref 0.3–6.2)
ERYTHROCYTE [DISTWIDTH] IN BLOOD BY AUTOMATED COUNT: 12.6 % (ref 12.3–15.4)
GFR SERPL CREATININE-BSD FRML MDRD: 63 ML/MIN/1.73
GLOBULIN UR ELPH-MCNC: 2.7 GM/DL
GLUCOSE SERPL-MCNC: 127 MG/DL (ref 65–99)
GLUCOSE UR STRIP-MCNC: NEGATIVE MG/DL
HBA1C MFR BLD: 5.5 % (ref 4.8–5.6)
HCT VFR BLD AUTO: 42.5 % (ref 37.5–51)
HGB BLD-MCNC: 14.3 G/DL (ref 13–17.7)
HGB UR QL STRIP.AUTO: NEGATIVE
IMM GRANULOCYTES # BLD AUTO: 0.01 10*3/MM3 (ref 0–0.05)
IMM GRANULOCYTES NFR BLD AUTO: 0.2 % (ref 0–0.5)
INR PPP: 1.05 (ref 0.9–1.1)
KETONES UR QL STRIP: NEGATIVE
LEUKOCYTE ESTERASE UR QL STRIP.AUTO: NEGATIVE
LYMPHOCYTES # BLD AUTO: 1.04 10*3/MM3 (ref 0.7–3.1)
LYMPHOCYTES NFR BLD AUTO: 18.3 % (ref 19.6–45.3)
MCH RBC QN AUTO: 31.5 PG (ref 26.6–33)
MCHC RBC AUTO-ENTMCNC: 33.6 G/DL (ref 31.5–35.7)
MCV RBC AUTO: 93.6 FL (ref 79–97)
MONOCYTES # BLD AUTO: 0.95 10*3/MM3 (ref 0.1–0.9)
MONOCYTES NFR BLD AUTO: 16.7 % (ref 5–12)
NEUTROPHILS NFR BLD AUTO: 3.17 10*3/MM3 (ref 1.7–7)
NEUTROPHILS NFR BLD AUTO: 55.7 % (ref 42.7–76)
NITRITE UR QL STRIP: NEGATIVE
NRBC BLD AUTO-RTO: 0 /100 WBC (ref 0–0.2)
NT-PROBNP SERPL-MCNC: 46.9 PG/ML (ref 0–1800)
PH UR STRIP.AUTO: 6.5 [PH] (ref 5–8)
PLATELET # BLD AUTO: 159 10*3/MM3 (ref 140–450)
PMV BLD AUTO: 9.5 FL (ref 6–12)
POTASSIUM SERPL-SCNC: 3.6 MMOL/L (ref 3.5–5.2)
PROT SERPL-MCNC: 7 G/DL (ref 6–8.5)
PROT UR QL STRIP: NEGATIVE
PROTHROMBIN TIME: 13.5 SECONDS (ref 11.7–14.2)
QT INTERVAL: 386 MS
RBC # BLD AUTO: 4.54 10*6/MM3 (ref 4.14–5.8)
RH BLD: POSITIVE
SODIUM SERPL-SCNC: 138 MMOL/L (ref 136–145)
SP GR UR STRIP: 1.02 (ref 1–1.03)
T&S EXPIRATION DATE: NORMAL
UROBILINOGEN UR QL STRIP: NORMAL
WBC # BLD AUTO: 5.69 10*3/MM3 (ref 3.4–10.8)

## 2021-10-29 PROCEDURE — 71046 X-RAY EXAM CHEST 2 VIEWS: CPT

## 2021-10-29 PROCEDURE — 81003 URINALYSIS AUTO W/O SCOPE: CPT

## 2021-10-29 PROCEDURE — 86900 BLOOD TYPING SEROLOGIC ABO: CPT

## 2021-10-29 PROCEDURE — 85610 PROTHROMBIN TIME: CPT

## 2021-10-29 PROCEDURE — 86850 RBC ANTIBODY SCREEN: CPT

## 2021-10-29 PROCEDURE — 85730 THROMBOPLASTIN TIME PARTIAL: CPT

## 2021-10-29 PROCEDURE — 93005 ELECTROCARDIOGRAM TRACING: CPT

## 2021-10-29 PROCEDURE — 83036 HEMOGLOBIN GLYCOSYLATED A1C: CPT

## 2021-10-29 PROCEDURE — 85025 COMPLETE CBC W/AUTO DIFF WBC: CPT

## 2021-10-29 PROCEDURE — 83880 ASSAY OF NATRIURETIC PEPTIDE: CPT

## 2021-10-29 PROCEDURE — 36415 COLL VENOUS BLD VENIPUNCTURE: CPT

## 2021-10-29 PROCEDURE — 86901 BLOOD TYPING SEROLOGIC RH(D): CPT

## 2021-10-29 PROCEDURE — 93010 ELECTROCARDIOGRAM REPORT: CPT | Performed by: INTERNAL MEDICINE

## 2021-10-29 PROCEDURE — 85576 BLOOD PLATELET AGGREGATION: CPT

## 2021-10-29 PROCEDURE — 80053 COMPREHEN METABOLIC PANEL: CPT

## 2021-10-29 RX ORDER — CHLORHEXIDINE GLUCONATE 0.12 MG/ML
15 RINSE ORAL EVERY 12 HOURS
Status: DISPENSED | OUTPATIENT
Start: 2021-10-29 | End: 2021-10-30

## 2021-10-30 ENCOUNTER — LAB (OUTPATIENT)
Dept: LAB | Facility: HOSPITAL | Age: 76
End: 2021-10-30

## 2021-10-30 DIAGNOSIS — I35.0 AORTIC VALVE STENOSIS, ETIOLOGY OF CARDIAC VALVE DISEASE UNSPECIFIED: ICD-10-CM

## 2021-10-30 PROCEDURE — C9803 HOPD COVID-19 SPEC COLLECT: HCPCS

## 2021-10-30 PROCEDURE — U0004 COV-19 TEST NON-CDC HGH THRU: HCPCS

## 2021-10-30 PROCEDURE — U0005 INFEC AGEN DETEC AMPLI PROBE: HCPCS

## 2021-10-31 LAB — SARS-COV-2 ORF1AB RESP QL NAA+PROBE: NOT DETECTED

## 2021-11-02 ENCOUNTER — HOSPITAL ENCOUNTER (INPATIENT)
Facility: HOSPITAL | Age: 76
LOS: 1 days | Discharge: HOME | End: 2021-11-03
Attending: THORACIC SURGERY (CARDIOTHORACIC VASCULAR SURGERY) | Admitting: THORACIC SURGERY (CARDIOTHORACIC VASCULAR SURGERY)

## 2021-11-02 ENCOUNTER — ANESTHESIA (OUTPATIENT)
Dept: PERIOP | Facility: HOSPITAL | Age: 76
End: 2021-11-02

## 2021-11-02 ENCOUNTER — APPOINTMENT (OUTPATIENT)
Dept: GENERAL RADIOLOGY | Facility: HOSPITAL | Age: 76
End: 2021-11-02

## 2021-11-02 DIAGNOSIS — I50.32 CHRONIC DIASTOLIC CONGESTIVE HEART FAILURE (HCC): ICD-10-CM

## 2021-11-02 DIAGNOSIS — I35.0 AORTIC STENOSIS, SEVERE: Primary | ICD-10-CM

## 2021-11-02 DIAGNOSIS — I35.0 AORTIC VALVE STENOSIS, ETIOLOGY OF CARDIAC VALVE DISEASE UNSPECIFIED: ICD-10-CM

## 2021-11-02 DIAGNOSIS — I35.0 AORTIC STENOSIS, SEVERE: ICD-10-CM

## 2021-11-02 LAB
ACT BLD: 114 SECONDS (ref 82–152)
ACT BLD: 120 SECONDS (ref 82–152)
ACT BLD: 329 SECONDS (ref 82–152)
BASE EXCESS BLDA CALC-SCNC: 1 MMOL/L (ref -5–5)
CA-I BLDA-SCNC: ABNORMAL MMOL/L
CO2 BLDA-SCNC: 28 MMOL/L (ref 24–29)
GLUCOSE BLDC GLUCOMTR-MCNC: 103 MG/DL (ref 70–130)
HCO3 BLDA-SCNC: 26.9 MMOL/L (ref 22–26)
HCT VFR BLDA CALC: 37 % (ref 38–51)
HGB BLDA-MCNC: 12.6 G/DL (ref 12–17)
PCO2 BLDA: 47.7 MM HG (ref 35–45)
PH BLDA: 7.36 PH UNITS (ref 7.35–7.6)
PO2 BLDA: 273 MMHG (ref 80–105)
POTASSIUM BLDA-SCNC: 3.6 MMOL/L (ref 3.5–4.9)
QT INTERVAL: 415 MS
SAO2 % BLDA: 100 % (ref 95–98)

## 2021-11-02 PROCEDURE — B41D1ZZ FLUOROSCOPY OF AORTA AND BILATERAL LOWER EXTREMITY ARTERIES USING LOW OSMOLAR CONTRAST: ICD-10-PCS | Performed by: INTERNAL MEDICINE

## 2021-11-02 PROCEDURE — 85014 HEMATOCRIT: CPT

## 2021-11-02 PROCEDURE — 85018 HEMOGLOBIN: CPT

## 2021-11-02 PROCEDURE — 25010000002 PROTAMINE SULFATE PER 10 MG: Performed by: ANESTHESIOLOGY

## 2021-11-02 PROCEDURE — S0260 H&P FOR SURGERY: HCPCS | Performed by: THORACIC SURGERY (CARDIOTHORACIC VASCULAR SURGERY)

## 2021-11-02 PROCEDURE — 25010000002 HEPARIN (PORCINE) PER 1000 UNITS: Performed by: ANESTHESIOLOGY

## 2021-11-02 PROCEDURE — 25010000002 MIDAZOLAM PER 1 MG: Performed by: ANESTHESIOLOGY

## 2021-11-02 PROCEDURE — C1769 GUIDE WIRE: HCPCS | Performed by: THORACIC SURGERY (CARDIOTHORACIC VASCULAR SURGERY)

## 2021-11-02 PROCEDURE — C1894 INTRO/SHEATH, NON-LASER: HCPCS | Performed by: THORACIC SURGERY (CARDIOTHORACIC VASCULAR SURGERY)

## 2021-11-02 PROCEDURE — 25010000002 PHENYLEPHRINE 10 MG/ML SOLUTION 5 ML VIAL: Performed by: ANESTHESIOLOGY

## 2021-11-02 PROCEDURE — C1760 CLOSURE DEV, VASC: HCPCS | Performed by: THORACIC SURGERY (CARDIOTHORACIC VASCULAR SURGERY)

## 2021-11-02 PROCEDURE — 02RF38Z REPLACEMENT OF AORTIC VALVE WITH ZOOPLASTIC TISSUE, PERCUTANEOUS APPROACH: ICD-10-PCS | Performed by: THORACIC SURGERY (CARDIOTHORACIC VASCULAR SURGERY)

## 2021-11-02 PROCEDURE — 85347 COAGULATION TIME ACTIVATED: CPT

## 2021-11-02 PROCEDURE — C1889 IMPLANT/INSERT DEVICE, NOC: HCPCS | Performed by: THORACIC SURGERY (CARDIOTHORACIC VASCULAR SURGERY)

## 2021-11-02 PROCEDURE — 25010000002 FENTANYL CITRATE (PF) 50 MCG/ML SOLUTION: Performed by: ANESTHESIOLOGY

## 2021-11-02 PROCEDURE — 93010 ELECTROCARDIOGRAM REPORT: CPT | Performed by: INTERNAL MEDICINE

## 2021-11-02 PROCEDURE — 93005 ELECTROCARDIOGRAM TRACING: CPT | Performed by: INTERNAL MEDICINE

## 2021-11-02 PROCEDURE — 0 CEFAZOLIN IN DEXTROSE 2-4 GM/100ML-% SOLUTION: Performed by: NURSE PRACTITIONER

## 2021-11-02 PROCEDURE — C1751 CATH, INF, PER/CENT/MIDLINE: HCPCS | Performed by: ANESTHESIOLOGY

## 2021-11-02 PROCEDURE — 33361 REPLACE AORTIC VALVE PERQ: CPT | Performed by: THORACIC SURGERY (CARDIOTHORACIC VASCULAR SURGERY)

## 2021-11-02 PROCEDURE — 33361 REPLACE AORTIC VALVE PERQ: CPT | Performed by: INTERNAL MEDICINE

## 2021-11-02 PROCEDURE — 82947 ASSAY GLUCOSE BLOOD QUANT: CPT

## 2021-11-02 PROCEDURE — 0 IOPAMIDOL PER 1 ML: Performed by: THORACIC SURGERY (CARDIOTHORACIC VASCULAR SURGERY)

## 2021-11-02 PROCEDURE — 82803 BLOOD GASES ANY COMBINATION: CPT

## 2021-11-02 PROCEDURE — 25010000002 PROPOFOL 10 MG/ML EMULSION: Performed by: ANESTHESIOLOGY

## 2021-11-02 DEVICE — VLV EVPROPLUS-29 COMM US
Type: IMPLANTABLE DEVICE | Site: HEART | Status: FUNCTIONAL
Brand: EVOLUT™ PRO+

## 2021-11-02 RX ORDER — LIDOCAINE HYDROCHLORIDE 20 MG/ML
INJECTION, SOLUTION INFILTRATION; PERINEURAL AS NEEDED
Status: DISCONTINUED | OUTPATIENT
Start: 2021-11-02 | End: 2021-11-02 | Stop reason: HOSPADM

## 2021-11-02 RX ORDER — MIDAZOLAM HYDROCHLORIDE 1 MG/ML
0.5 INJECTION INTRAMUSCULAR; INTRAVENOUS
Status: DISCONTINUED | OUTPATIENT
Start: 2021-11-02 | End: 2021-11-02 | Stop reason: HOSPADM

## 2021-11-02 RX ORDER — SODIUM CHLORIDE 0.9 % (FLUSH) 0.9 %
10 SYRINGE (ML) INJECTION AS NEEDED
Status: DISCONTINUED | OUTPATIENT
Start: 2021-11-02 | End: 2021-11-02 | Stop reason: HOSPADM

## 2021-11-02 RX ORDER — FAMOTIDINE 10 MG/ML
20 INJECTION, SOLUTION INTRAVENOUS
Status: COMPLETED | OUTPATIENT
Start: 2021-11-02 | End: 2021-11-02

## 2021-11-02 RX ORDER — LOSARTAN POTASSIUM 100 MG/1
100 TABLET ORAL NIGHTLY
Status: DISCONTINUED | OUTPATIENT
Start: 2021-11-02 | End: 2021-11-03 | Stop reason: HOSPADM

## 2021-11-02 RX ORDER — FENTANYL CITRATE 50 UG/ML
INJECTION, SOLUTION INTRAMUSCULAR; INTRAVENOUS AS NEEDED
Status: DISCONTINUED | OUTPATIENT
Start: 2021-11-02 | End: 2021-11-02 | Stop reason: SURG

## 2021-11-02 RX ORDER — CEFAZOLIN SODIUM 2 G/100ML
2 INJECTION, SOLUTION INTRAVENOUS
Status: COMPLETED | OUTPATIENT
Start: 2021-11-03 | End: 2021-11-02

## 2021-11-02 RX ORDER — CHLORHEXIDINE GLUCONATE 0.12 MG/ML
15 RINSE ORAL ONCE
Status: DISCONTINUED | OUTPATIENT
Start: 2021-11-02 | End: 2021-11-02 | Stop reason: HOSPADM

## 2021-11-02 RX ORDER — PROTAMINE SULFATE 10 MG/ML
INJECTION, SOLUTION INTRAVENOUS AS NEEDED
Status: DISCONTINUED | OUTPATIENT
Start: 2021-11-02 | End: 2021-11-02 | Stop reason: SURG

## 2021-11-02 RX ORDER — SODIUM CHLORIDE 9 MG/ML
9 INJECTION, SOLUTION INTRAVENOUS CONTINUOUS
Status: DISCONTINUED | OUTPATIENT
Start: 2021-11-02 | End: 2021-11-03 | Stop reason: HOSPADM

## 2021-11-02 RX ORDER — PANTOPRAZOLE SODIUM 40 MG/1
40 TABLET, DELAYED RELEASE ORAL EVERY MORNING
Refills: 2 | Status: DISCONTINUED | OUTPATIENT
Start: 2021-11-03 | End: 2021-11-03 | Stop reason: HOSPADM

## 2021-11-02 RX ORDER — CLOPIDOGREL BISULFATE 75 MG/1
75 TABLET ORAL DAILY
Status: DISCONTINUED | OUTPATIENT
Start: 2021-11-03 | End: 2021-11-03 | Stop reason: HOSPADM

## 2021-11-02 RX ORDER — HYDROCHLOROTHIAZIDE 25 MG/1
25 TABLET ORAL DAILY
Status: DISCONTINUED | OUTPATIENT
Start: 2021-11-02 | End: 2021-11-03 | Stop reason: HOSPADM

## 2021-11-02 RX ORDER — SODIUM CHLORIDE 0.9 % (FLUSH) 0.9 %
3 SYRINGE (ML) INJECTION EVERY 12 HOURS SCHEDULED
Status: DISCONTINUED | OUTPATIENT
Start: 2021-11-02 | End: 2021-11-02 | Stop reason: HOSPADM

## 2021-11-02 RX ORDER — ATORVASTATIN CALCIUM 20 MG/1
20 TABLET, FILM COATED ORAL NIGHTLY
Status: DISCONTINUED | OUTPATIENT
Start: 2021-11-02 | End: 2021-11-03 | Stop reason: HOSPADM

## 2021-11-02 RX ORDER — SODIUM CHLORIDE 9 MG/ML
INJECTION, SOLUTION INTRAVENOUS CONTINUOUS PRN
Status: DISCONTINUED | OUTPATIENT
Start: 2021-11-02 | End: 2021-11-02 | Stop reason: SURG

## 2021-11-02 RX ORDER — KETAMINE HYDROCHLORIDE 10 MG/ML
INJECTION INTRAMUSCULAR; INTRAVENOUS AS NEEDED
Status: DISCONTINUED | OUTPATIENT
Start: 2021-11-02 | End: 2021-11-02 | Stop reason: SURG

## 2021-11-02 RX ORDER — MIDAZOLAM HYDROCHLORIDE 1 MG/ML
INJECTION INTRAMUSCULAR; INTRAVENOUS AS NEEDED
Status: DISCONTINUED | OUTPATIENT
Start: 2021-11-02 | End: 2021-11-02 | Stop reason: SURG

## 2021-11-02 RX ORDER — ONDANSETRON 4 MG/1
4 TABLET, FILM COATED ORAL EVERY 6 HOURS PRN
Status: DISCONTINUED | OUTPATIENT
Start: 2021-11-02 | End: 2021-11-03 | Stop reason: HOSPADM

## 2021-11-02 RX ORDER — FAMOTIDINE 10 MG/ML
20 INJECTION, SOLUTION INTRAVENOUS ONCE
Status: DISCONTINUED | OUTPATIENT
Start: 2021-11-02 | End: 2021-11-02 | Stop reason: HOSPADM

## 2021-11-02 RX ORDER — SODIUM CHLORIDE, SODIUM LACTATE, POTASSIUM CHLORIDE, CALCIUM CHLORIDE 600; 310; 30; 20 MG/100ML; MG/100ML; MG/100ML; MG/100ML
9 INJECTION, SOLUTION INTRAVENOUS CONTINUOUS
Status: DISCONTINUED | OUTPATIENT
Start: 2021-11-02 | End: 2021-11-02

## 2021-11-02 RX ORDER — ONDANSETRON 2 MG/ML
4 INJECTION INTRAMUSCULAR; INTRAVENOUS EVERY 6 HOURS PRN
Status: DISCONTINUED | OUTPATIENT
Start: 2021-11-02 | End: 2021-11-03 | Stop reason: HOSPADM

## 2021-11-02 RX ORDER — PHENYLEPHRINE HCL IN 0.9% NACL 0.5 MG/5ML
.5-3 SYRINGE (ML) INTRAVENOUS
Status: DISCONTINUED | OUTPATIENT
Start: 2021-11-02 | End: 2021-11-03

## 2021-11-02 RX ORDER — DEXMEDETOMIDINE HYDROCHLORIDE 4 UG/ML
INJECTION, SOLUTION INTRAVENOUS CONTINUOUS PRN
Status: DISCONTINUED | OUTPATIENT
Start: 2021-11-02 | End: 2021-11-02 | Stop reason: SURG

## 2021-11-02 RX ORDER — SODIUM CHLORIDE 0.9 % (FLUSH) 0.9 %
3-10 SYRINGE (ML) INJECTION AS NEEDED
Status: DISCONTINUED | OUTPATIENT
Start: 2021-11-02 | End: 2021-11-02 | Stop reason: HOSPADM

## 2021-11-02 RX ORDER — FENTANYL CITRATE 50 UG/ML
50 INJECTION, SOLUTION INTRAMUSCULAR; INTRAVENOUS
Status: DISCONTINUED | OUTPATIENT
Start: 2021-11-02 | End: 2021-11-02 | Stop reason: HOSPADM

## 2021-11-02 RX ORDER — DEXMEDETOMIDINE HYDROCHLORIDE 100 UG/ML
INJECTION, SOLUTION INTRAVENOUS AS NEEDED
Status: DISCONTINUED | OUTPATIENT
Start: 2021-11-02 | End: 2021-11-02 | Stop reason: SURG

## 2021-11-02 RX ORDER — ACETAMINOPHEN 325 MG/1
650 TABLET ORAL EVERY 4 HOURS PRN
Status: DISCONTINUED | OUTPATIENT
Start: 2021-11-02 | End: 2021-11-03 | Stop reason: HOSPADM

## 2021-11-02 RX ORDER — SODIUM CHLORIDE, SODIUM LACTATE, POTASSIUM CHLORIDE, CALCIUM CHLORIDE 600; 310; 30; 20 MG/100ML; MG/100ML; MG/100ML; MG/100ML
9 INJECTION, SOLUTION INTRAVENOUS CONTINUOUS PRN
Status: DISCONTINUED | OUTPATIENT
Start: 2021-11-02 | End: 2021-11-02

## 2021-11-02 RX ORDER — LIDOCAINE HYDROCHLORIDE 10 MG/ML
0.5 INJECTION, SOLUTION EPIDURAL; INFILTRATION; INTRACAUDAL; PERINEURAL ONCE AS NEEDED
Status: DISCONTINUED | OUTPATIENT
Start: 2021-11-02 | End: 2021-11-02 | Stop reason: HOSPADM

## 2021-11-02 RX ORDER — SODIUM CHLORIDE 0.9 % (FLUSH) 0.9 %
10 SYRINGE (ML) INJECTION EVERY 12 HOURS SCHEDULED
Status: DISCONTINUED | OUTPATIENT
Start: 2021-11-02 | End: 2021-11-02 | Stop reason: HOSPADM

## 2021-11-02 RX ORDER — ASPIRIN 81 MG/1
81 TABLET ORAL DAILY
Status: DISCONTINUED | OUTPATIENT
Start: 2021-11-03 | End: 2021-11-03 | Stop reason: HOSPADM

## 2021-11-02 RX ORDER — HEPARIN SODIUM 1000 [USP'U]/ML
INJECTION, SOLUTION INTRAVENOUS; SUBCUTANEOUS AS NEEDED
Status: DISCONTINUED | OUTPATIENT
Start: 2021-11-02 | End: 2021-11-02 | Stop reason: SURG

## 2021-11-02 RX ORDER — FENTANYL CITRATE 50 UG/ML
INJECTION, SOLUTION INTRAMUSCULAR; INTRAVENOUS
Status: COMPLETED | OUTPATIENT
Start: 2021-11-02 | End: 2021-11-02

## 2021-11-02 RX ORDER — DIPHENOXYLATE HYDROCHLORIDE AND ATROPINE SULFATE 2.5; .025 MG/1; MG/1
1 TABLET ORAL DAILY
Status: DISCONTINUED | OUTPATIENT
Start: 2021-11-02 | End: 2021-11-03 | Stop reason: HOSPADM

## 2021-11-02 RX ORDER — MIDAZOLAM HYDROCHLORIDE 1 MG/ML
INJECTION INTRAMUSCULAR; INTRAVENOUS
Status: COMPLETED | OUTPATIENT
Start: 2021-11-02 | End: 2021-11-02

## 2021-11-02 RX ORDER — SODIUM CHLORIDE 9 MG/ML
50 INJECTION, SOLUTION INTRAVENOUS CONTINUOUS
Status: ACTIVE | OUTPATIENT
Start: 2021-11-02 | End: 2021-11-03

## 2021-11-02 RX ADMIN — MIDAZOLAM 1 MG: 1 INJECTION INTRAMUSCULAR; INTRAVENOUS at 10:34

## 2021-11-02 RX ADMIN — HEPARIN SODIUM 13000 UNITS: 1000 INJECTION INTRAVENOUS; SUBCUTANEOUS at 14:12

## 2021-11-02 RX ADMIN — SODIUM CHLORIDE: 9 INJECTION, SOLUTION INTRAVENOUS at 13:32

## 2021-11-02 RX ADMIN — SODIUM CHLORIDE 9 ML/HR: 9 INJECTION, SOLUTION INTRAVENOUS at 10:35

## 2021-11-02 RX ADMIN — KETAMINE HYDROCHLORIDE 10 MG: 10 INJECTION INTRAMUSCULAR; INTRAVENOUS at 13:55

## 2021-11-02 RX ADMIN — MIDAZOLAM 1 MG: 1 INJECTION INTRAMUSCULAR; INTRAVENOUS at 10:40

## 2021-11-02 RX ADMIN — KETAMINE HYDROCHLORIDE 10 MG: 10 INJECTION INTRAMUSCULAR; INTRAVENOUS at 13:40

## 2021-11-02 RX ADMIN — PROTAMINE SULFATE 75 MG: 10 INJECTION, SOLUTION INTRAVENOUS at 14:45

## 2021-11-02 RX ADMIN — FAMOTIDINE 20 MG: 10 INJECTION INTRAVENOUS at 10:35

## 2021-11-02 RX ADMIN — SODIUM CHLORIDE 50 ML/HR: 9 INJECTION, SOLUTION INTRAVENOUS at 15:25

## 2021-11-02 RX ADMIN — DEXMEDETOMIDINE 42 MCG: 100 INJECTION, SOLUTION, CONCENTRATE INTRAVENOUS at 13:40

## 2021-11-02 RX ADMIN — MIDAZOLAM 1 MG: 1 INJECTION INTRAMUSCULAR; INTRAVENOUS at 13:48

## 2021-11-02 RX ADMIN — CEFAZOLIN SODIUM 2 G: 2 INJECTION, SOLUTION INTRAVENOUS at 13:40

## 2021-11-02 RX ADMIN — MIDAZOLAM 1 MG: 1 INJECTION INTRAMUSCULAR; INTRAVENOUS at 13:40

## 2021-11-02 RX ADMIN — LOSARTAN POTASSIUM 100 MG: 100 TABLET, FILM COATED ORAL at 20:49

## 2021-11-02 RX ADMIN — FENTANYL CITRATE 25 MCG: 0.05 INJECTION, SOLUTION INTRAMUSCULAR; INTRAVENOUS at 13:55

## 2021-11-02 RX ADMIN — FENTANYL CITRATE 50 MCG: 0.05 INJECTION, SOLUTION INTRAMUSCULAR; INTRAVENOUS at 10:42

## 2021-11-02 RX ADMIN — FENTANYL CITRATE 50 MCG: 0.05 INJECTION, SOLUTION INTRAMUSCULAR; INTRAVENOUS at 10:34

## 2021-11-02 RX ADMIN — HYDROCHLOROTHIAZIDE 25 MG: 25 TABLET ORAL at 18:36

## 2021-11-02 RX ADMIN — DEXMEDETOMIDINE HYDROCHLORIDE 1 MCG/KG/HR: 4 INJECTION, SOLUTION INTRAVENOUS at 13:52

## 2021-11-02 RX ADMIN — PROPOFOL 10 MCG/KG/MIN: 10 INJECTION, EMULSION INTRAVENOUS at 13:40

## 2021-11-02 RX ADMIN — KETAMINE HYDROCHLORIDE 10 MG: 10 INJECTION INTRAMUSCULAR; INTRAVENOUS at 14:08

## 2021-11-02 RX ADMIN — ATORVASTATIN CALCIUM 20 MG: 20 TABLET, FILM COATED ORAL at 20:51

## 2021-11-02 RX ADMIN — PHENYLEPHRINE HYDROCHLORIDE 0.2 MCG/KG/MIN: 10 INJECTION INTRAVENOUS at 14:10

## 2021-11-02 NOTE — ANESTHESIA POSTPROCEDURE EVALUATION
Patient: Luis Emery    Procedure Summary     Date: 11/02/21 Room / Location: [facility]    Anesthesia Start: 1332 Anesthesia Stop: 1515    Procedures:       TTE TRANSFEMORAL TRANSCATHETER AORTIC VALVE REPLACEMENT with IntraOp TTE and possible open surgical rescue. (N/A Chest)      Transfemoral Transcatheter Aortic Valve Replacement with IntraOp TTE and possible open surgical rescue. (N/A ) Diagnosis:       Aortic stenosis, severe      (Aortic stenosis, severe [I35.0])    Surgeons: Preston Hager MD; Manpreet Vargas MD Provider: Mansoor Mendez MD    Anesthesia Type: MAC ASA Status: 3          Anesthesia Type: MAC    Vitals  Vitals Value Taken Time   /84 11/02/21 1616   Temp 36.5 °C (97.7 °F) 11/02/21 1512   Pulse 64 11/02/21 1629   Resp     SpO2 100 % 11/02/21 1629   Vitals shown include unvalidated device data.        Post Anesthesia Care and Evaluation    Patient location during evaluation: bedside  Patient participation: complete - patient participated  Level of consciousness: awake and alert  Pain management: adequate  Airway patency: patent  Anesthetic complications: No anesthetic complications    Cardiovascular status: acceptable  Respiratory status: acceptable  Hydration status: acceptable    Comments: /77   Pulse 71   Temp 36.5 °C (97.7 °F)   Resp 15   Wt 84.4 kg (186 lb 1.1 oz)   SpO2 98%   BMI 29.14 kg/m²

## 2021-11-02 NOTE — ANESTHESIA PROCEDURE NOTES
Central Line      Patient location during procedure: holding area  Indications: central pressure monitoring and vascular access  Staff  Anesthesiologist: Richardson Wu MD  Preanesthetic Checklist  Completed: patient identified, IV checked, site marked, risks and benefits discussed, surgical consent, monitors and equipment checked, pre-op evaluation and timeout performed  Central Line Prep  Sterile Tech:cap, gloves, gown, mask and sterile barriers  Prep: chloraprep  Patient monitoring: blood pressure monitoring, continuous pulse oximetry and EKG  Central Line Procedure  Location:internal jugular  Catheter Type:Cordis, MAC and double lumen  Catheter Size:6 Fr  Guidance:ultrasound guided  PROCEDURE NOTE/ULTRASOUND INTERPRETATION.  Using ultrasound guidance the potential vascular sites for insertion of the catheter were visualized to determine the patency of the vessel to be used for vascular access.  After selecting the appropriate site for insertion, the needle was visualized under ultrasound being inserted into the internal jugular vein, followed by ultrasound confirmation of wire and catheter placement. There were no abnormalities seen on ultrasound; an image was taken; and the patient tolerated the procedure with no complications. Images: still images obtained, printed/placed on chart  Assessment  Post procedure:biopatch applied, line sutured, occlusive dressing applied and secured with tape  Assessement:blood return through all ports, free fluid flow and chest x-ray ordered  Complications:no  Patient Tolerance:patient tolerated the procedure well with no apparent complications  Additional Notes  Ultrasound interpretation note:  Under ultrasound guidance, the available vessels were evaluated and the selected vessel deemed patent.  The needle, wire and catheter were seen in the vessel.  An image was taken.  No abnormalities noted.

## 2021-11-02 NOTE — ANESTHESIA PROCEDURE NOTES
Arterial Line    Pre-sedation assessment completed: 11/2/2021 10:33 AM    Patient location during procedure: holding area  Start time: 11/2/2021 10:33 AM  Stop Time:11/2/2021 10:36 AM       Line placed for hemodynamic monitoring.  Performed By   Anesthesiologist: Richardson Wu MD  Preanesthetic Checklist  Completed: patient identified, IV checked, site marked, risks and benefits discussed, surgical consent, monitors and equipment checked, pre-op evaluation and timeout performed  Arterial Line Prep   Sterile Tech: gloves  Prep: ChloraPrep  Patient monitoring: blood pressure monitoring, continuous pulse oximetry and EKG  Arterial Line Procedure   Laterality:left  Location:  radial artery  Catheter size: 20 G   Guidance: palpation technique  Successful placement: yes  Post Assessment   Dressing Type: occlusive dressing applied, secured with tape and wrist guard applied.   Complications no  Circ/Move/Sens Assessment: normal and unchanged.   Patient Tolerance: patient tolerated the procedure well with no apparent complications  Additional Notes  :

## 2021-11-03 ENCOUNTER — APPOINTMENT (OUTPATIENT)
Dept: CARDIOLOGY | Facility: HOSPITAL | Age: 76
End: 2021-11-03

## 2021-11-03 ENCOUNTER — READMISSION MANAGEMENT (OUTPATIENT)
Dept: CALL CENTER | Facility: HOSPITAL | Age: 76
End: 2021-11-03

## 2021-11-03 VITALS
OXYGEN SATURATION: 99 % | HEART RATE: 104 BPM | SYSTOLIC BLOOD PRESSURE: 152 MMHG | WEIGHT: 186 LBS | DIASTOLIC BLOOD PRESSURE: 67 MMHG | RESPIRATION RATE: 16 BRPM | BODY MASS INDEX: 29.19 KG/M2 | HEIGHT: 67 IN | TEMPERATURE: 98.6 F

## 2021-11-03 LAB
ANION GAP SERPL CALCULATED.3IONS-SCNC: 6.5 MMOL/L (ref 5–15)
AORTIC DIMENSIONLESS INDEX: 0.8 (DI)
BH CV ECHO AV AORTIC VALVE AT ACCEL TIME CALCULATED: 58 MSEC
BH CV ECHO MEAS - AO ACC TIME: 0.05 SEC
BH CV ECHO MEAS - AO MAX PG (FULL): 10.4 MMHG
BH CV ECHO MEAS - AO MAX PG: 18.6 MMHG
BH CV ECHO MEAS - AO MEAN PG (FULL): 4.6 MMHG
BH CV ECHO MEAS - AO MEAN PG: 9.2 MMHG
BH CV ECHO MEAS - AO V2 MAX: 215.8 CM/SEC
BH CV ECHO MEAS - AO V2 MEAN: 138 CM/SEC
BH CV ECHO MEAS - AO V2 VTI: 35 CM
BH CV ECHO MEAS - AT: 0.06 SEC
BH CV ECHO MEAS - AVA(I,A): 2 CM^2
BH CV ECHO MEAS - AVA(I,D): 2 CM^2
BH CV ECHO MEAS - AVA(V,A): 1.9 CM^2
BH CV ECHO MEAS - AVA(V,D): 1.9 CM^2
BH CV ECHO MEAS - BSA(HAYCOCK): 2 M^2
BH CV ECHO MEAS - BSA: 2 M^2
BH CV ECHO MEAS - BZI_BMI: 29.1 KILOGRAMS/M^2
BH CV ECHO MEAS - BZI_METRIC_HEIGHT: 170.2 CM
BH CV ECHO MEAS - BZI_METRIC_WEIGHT: 84.4 KG
BH CV ECHO MEAS - EDV(MOD-SP2): 50 ML
BH CV ECHO MEAS - EDV(MOD-SP4): 43 ML
BH CV ECHO MEAS - EF(MOD-BP): 67.5 %
BH CV ECHO MEAS - EF(MOD-SP2): 66 %
BH CV ECHO MEAS - EF(MOD-SP4): 69.8 %
BH CV ECHO MEAS - ESV(MOD-SP2): 17 ML
BH CV ECHO MEAS - ESV(MOD-SP4): 13 ML
BH CV ECHO MEAS - LV DIASTOLIC VOL/BSA (35-75): 21.9 ML/M^2
BH CV ECHO MEAS - LV MAX PG: 8.2 MMHG
BH CV ECHO MEAS - LV MEAN PG: 4.5 MMHG
BH CV ECHO MEAS - LV SYSTOLIC VOL/BSA (12-30): 6.6 ML/M^2
BH CV ECHO MEAS - LV V1 MAX: 143.3 CM/SEC
BH CV ECHO MEAS - LV V1 MEAN: 97 CM/SEC
BH CV ECHO MEAS - LV V1 VTI: 23.8 CM
BH CV ECHO MEAS - LVLD AP2: 7.1 CM
BH CV ECHO MEAS - LVLD AP4: 6.8 CM
BH CV ECHO MEAS - LVLS AP2: 5.4 CM
BH CV ECHO MEAS - LVLS AP4: 5.6 CM
BH CV ECHO MEAS - LVOT AREA (M): 2.8 CM^2
BH CV ECHO MEAS - LVOT AREA: 2.9 CM^2
BH CV ECHO MEAS - LVOT DIAM: 1.9 CM
BH CV ECHO MEAS - MV A DUR: 0.11 SEC
BH CV ECHO MEAS - MV A MAX VEL: 112.7 CM/SEC
BH CV ECHO MEAS - MV DEC SLOPE: 358.8 CM/SEC^2
BH CV ECHO MEAS - MV DEC TIME: 279 SEC
BH CV ECHO MEAS - MV E MAX VEL: 64.7 CM/SEC
BH CV ECHO MEAS - MV E/A: 0.57
BH CV ECHO MEAS - MV MAX PG: 6.7 MMHG
BH CV ECHO MEAS - MV MEAN PG: 2.6 MMHG
BH CV ECHO MEAS - MV P1/2T MAX VEL: 89.6 CM/SEC
BH CV ECHO MEAS - MV P1/2T: 73.1 MSEC
BH CV ECHO MEAS - MV V2 MAX: 129 CM/SEC
BH CV ECHO MEAS - MV V2 MEAN: 75.9 CM/SEC
BH CV ECHO MEAS - MV V2 VTI: 20.7 CM
BH CV ECHO MEAS - MVA P1/2T LCG: 2.5 CM^2
BH CV ECHO MEAS - MVA(P1/2T): 3 CM^2
BH CV ECHO MEAS - MVA(VTI): 3.4 CM^2
BH CV ECHO MEAS - SI(LVOT): 35.3 ML/M^2
BH CV ECHO MEAS - SI(MOD-SP2): 16.8 ML/M^2
BH CV ECHO MEAS - SI(MOD-SP4): 15.3 ML/M^2
BH CV ECHO MEAS - SV(LVOT): 69.2 ML
BH CV ECHO MEAS - SV(MOD-SP2): 33 ML
BH CV ECHO MEAS - SV(MOD-SP4): 30 ML
BUN SERPL-MCNC: 15 MG/DL (ref 8–23)
BUN/CREAT SERPL: 16.1 (ref 7–25)
CALCIUM SPEC-SCNC: 8.8 MG/DL (ref 8.6–10.5)
CHLORIDE SERPL-SCNC: 104 MMOL/L (ref 98–107)
CO2 SERPL-SCNC: 22.5 MMOL/L (ref 22–29)
CREAT SERPL-MCNC: 0.93 MG/DL (ref 0.76–1.27)
DEPRECATED RDW RBC AUTO: 42.1 FL (ref 37–54)
ERYTHROCYTE [DISTWIDTH] IN BLOOD BY AUTOMATED COUNT: 12.5 % (ref 12.3–15.4)
GFR SERPL CREATININE-BSD FRML MDRD: 79 ML/MIN/1.73
GLUCOSE SERPL-MCNC: 141 MG/DL (ref 65–99)
HCT VFR BLD AUTO: 37 % (ref 37.5–51)
HGB BLD-MCNC: 12.7 G/DL (ref 13–17.7)
LV EF 2D ECHO EST: 68 %
MAXIMAL PREDICTED HEART RATE: 142 BPM
MCH RBC QN AUTO: 31.6 PG (ref 26.6–33)
MCHC RBC AUTO-ENTMCNC: 34.3 G/DL (ref 31.5–35.7)
MCV RBC AUTO: 92 FL (ref 79–97)
PLATELET # BLD AUTO: 155 10*3/MM3 (ref 140–450)
PMV BLD AUTO: 9.3 FL (ref 6–12)
POTASSIUM SERPL-SCNC: 3.6 MMOL/L (ref 3.5–5.2)
QT INTERVAL: 355 MS
RBC # BLD AUTO: 4.02 10*6/MM3 (ref 4.14–5.8)
SODIUM SERPL-SCNC: 133 MMOL/L (ref 136–145)
STRESS TARGET HR: 121 BPM
WBC # BLD AUTO: 9.53 10*3/MM3 (ref 3.4–10.8)

## 2021-11-03 PROCEDURE — 85027 COMPLETE CBC AUTOMATED: CPT | Performed by: INTERNAL MEDICINE

## 2021-11-03 PROCEDURE — 93321 DOPPLER ECHO F-UP/LMTD STD: CPT

## 2021-11-03 PROCEDURE — 93308 TTE F-UP OR LMTD: CPT | Performed by: INTERNAL MEDICINE

## 2021-11-03 PROCEDURE — 99232 SBSQ HOSP IP/OBS MODERATE 35: CPT | Performed by: INTERNAL MEDICINE

## 2021-11-03 PROCEDURE — 93005 ELECTROCARDIOGRAM TRACING: CPT | Performed by: INTERNAL MEDICINE

## 2021-11-03 PROCEDURE — 93325 DOPPLER ECHO COLOR FLOW MAPG: CPT | Performed by: INTERNAL MEDICINE

## 2021-11-03 PROCEDURE — 99238 HOSP IP/OBS DSCHRG MGMT 30/<: CPT | Performed by: THORACIC SURGERY (CARDIOTHORACIC VASCULAR SURGERY)

## 2021-11-03 PROCEDURE — 93308 TTE F-UP OR LMTD: CPT

## 2021-11-03 PROCEDURE — 93321 DOPPLER ECHO F-UP/LMTD STD: CPT | Performed by: INTERNAL MEDICINE

## 2021-11-03 PROCEDURE — 80048 BASIC METABOLIC PNL TOTAL CA: CPT | Performed by: INTERNAL MEDICINE

## 2021-11-03 PROCEDURE — 93325 DOPPLER ECHO COLOR FLOW MAPG: CPT

## 2021-11-03 PROCEDURE — 93010 ELECTROCARDIOGRAM REPORT: CPT | Performed by: INTERNAL MEDICINE

## 2021-11-03 RX ORDER — METOPROLOL SUCCINATE 50 MG/1
50 TABLET, EXTENDED RELEASE ORAL
Status: DISCONTINUED | OUTPATIENT
Start: 2021-11-03 | End: 2021-11-03 | Stop reason: HOSPADM

## 2021-11-03 RX ORDER — CLOPIDOGREL BISULFATE 75 MG/1
75 TABLET ORAL DAILY
Qty: 30 TABLET | Refills: 2 | Status: SHIPPED | OUTPATIENT
Start: 2021-11-04 | End: 2022-02-02

## 2021-11-03 RX ORDER — METOPROLOL SUCCINATE 50 MG/1
50 TABLET, EXTENDED RELEASE ORAL
Qty: 30 TABLET | Refills: 2 | Status: SHIPPED | OUTPATIENT
Start: 2021-11-03 | End: 2022-02-17 | Stop reason: SDUPTHER

## 2021-11-03 RX ADMIN — PANTOPRAZOLE SODIUM 40 MG: 40 TABLET, DELAYED RELEASE ORAL at 06:32

## 2021-11-03 RX ADMIN — ASPIRIN 81 MG: 81 TABLET, COATED ORAL at 08:24

## 2021-11-03 RX ADMIN — METOPROLOL SUCCINATE 50 MG: 50 TABLET, EXTENDED RELEASE ORAL at 12:52

## 2021-11-03 RX ADMIN — HYDROCHLOROTHIAZIDE 25 MG: 25 TABLET ORAL at 08:24

## 2021-11-03 RX ADMIN — CLOPIDOGREL 75 MG: 75 TABLET, FILM COATED ORAL at 08:24

## 2021-11-03 RX ADMIN — Medication 1 TABLET: at 08:24

## 2021-11-03 NOTE — OUTREACH NOTE
Prep Survey      Responses   [facility] patient discharged from? [place]   Is LACE score < 7 ? Yes   Emergency Room discharge w/ pulse ox? No   Eligibility [facility]   Date of Admission 11/02/21   Date of Discharge 11/03/21   Discharge Disposition Home or Self Care   Discharge diagnosis Severe aortic stenosis--s/p TAVR   Does the patient have one of the following disease processes/diagnoses(primary or secondary)? Other   Does the patient have Home health ordered? No   Is there a DME ordered? No   Comments regarding appointments Plans outpt cardiac rehab in [place], [place].   Prep survey completed? Yes          Rosalinda Amin RN

## 2021-11-04 ENCOUNTER — TRANSITIONAL CARE MANAGEMENT TELEPHONE ENCOUNTER (OUTPATIENT)
Dept: CALL CENTER | Facility: HOSPITAL | Age: 76
End: 2021-11-04

## 2021-11-04 NOTE — OUTREACH NOTE
Call Center TCM Note      Responses   [facility] patient discharged from? [place]   Does the patient have one of the following disease processes/diagnoses(primary or secondary)? Other   TCM attempt successful? Yes   Discharge diagnosis Severe aortic stenosis--s/p TAVR   Meds reviewed with patient/caregiver? Yes   Is the patient having any side effects they believe may be caused by any medication additions or changes? No   Does the patient have all medications ordered at discharge? Yes   Is the patient taking all medications as directed (includes completed medication regime)? Yes   Does the patient have a primary care provider?  Yes   Does the patient have an appointment with their PCP within 7 days of discharge? No   Comments regarding PCP Pt will see [facility] for POST OP on 11/10/2021. Pt is sched with PCP Dr White for [facility] on 11/19/2021, pt is not yet sched for TCM fwp with PCP    Has the patient kept scheduled appointments due by today? N/A   Has home health visited the patient within 72 hours of discharge? N/A   Psychosocial issues? No   Did the patient receive a copy of their discharge instructions? Yes   Nursing interventions Reviewed instructions with patient   What is the patient's perception of their health status since discharge? Improving   Is the patient/caregiver able to teach back signs and symptoms related to disease process for when to call PCP? Yes   Is the patient/caregiver able to teach back signs and symptoms related to disease process for when to call 911? Yes   Is the patient/caregiver able to teach back the hierarchy of who to call/visit for symptoms/problems? PCP, Specialist, Home health nurse, Urgent Care, ED, 911 Yes   If the patient is a current smoker, are they able to teach back resources for cessation? Not a smoker   TCM call completed? Yes   Wrap up additional comments Pt a bit tired but feeling alot better than before procedure. Pt is taking short walks already, no  nausea, chest pain. A bit SOB with walking but mild. Pt has Plavix rx. No questions right now. Will discuss when to start Cardiac Rehab at [facility] fwp on 11/10/2021. Pt is sched with PCP Dr White for [facility] on 11/19/2021, pt is not yet sched for TCM fwp with PCP           Erin Anthony MA    11/4/2021, 16:10 EDT

## 2021-11-04 NOTE — OUTREACH NOTE
Call Center TCM Note      Responses   [facility] patient discharged from? [place]   Does the patient have one of the following disease processes/diagnoses(primary or secondary)? Other   TCM attempt successful? No   Unsuccessful attempts Attempt 1          Erin Anthony MA    11/4/2021, 13:19 EDT

## 2021-11-10 ENCOUNTER — OFFICE VISIT (OUTPATIENT)
Dept: CARDIOLOGY | Facility: CLINIC | Age: 76
End: 2021-11-10

## 2021-11-10 ENCOUNTER — HOSPITAL ENCOUNTER (OUTPATIENT)
Dept: CARDIOLOGY | Facility: HOSPITAL | Age: 76
Discharge: HOME | End: 2021-11-10
Admitting: NURSE PRACTITIONER

## 2021-11-10 VITALS
WEIGHT: 184 LBS | OXYGEN SATURATION: 98 % | DIASTOLIC BLOOD PRESSURE: 84 MMHG | BODY MASS INDEX: 28.88 KG/M2 | HEIGHT: 67 IN | SYSTOLIC BLOOD PRESSURE: 138 MMHG | HEART RATE: 69 BPM

## 2021-11-10 DIAGNOSIS — T88.8XXA OTHER SPECIFIED COMPLICATIONS OF SURGICAL AND MEDICAL CARE, NOT ELSEWHERE CLASSIFIED, INITIAL ENCOUNTER: ICD-10-CM

## 2021-11-10 DIAGNOSIS — I44.7 LBBB (LEFT BUNDLE BRANCH BLOCK): ICD-10-CM

## 2021-11-10 DIAGNOSIS — I97.638 POSTOPERATIVE HEMATOMA INVOLVING CIRCULATORY SYSTEM FOLLOWING OTHER CIRCULATORY SYSTEM PROCEDURE: ICD-10-CM

## 2021-11-10 DIAGNOSIS — E78.2 MIXED HYPERLIPIDEMIA: ICD-10-CM

## 2021-11-10 DIAGNOSIS — S30.1XXA GROIN HEMATOMA, INITIAL ENCOUNTER: ICD-10-CM

## 2021-11-10 DIAGNOSIS — I35.0 AORTIC STENOSIS, SEVERE: Primary | ICD-10-CM

## 2021-11-10 DIAGNOSIS — R10.31 GROIN PAIN, RIGHT: ICD-10-CM

## 2021-11-10 DIAGNOSIS — Z95.2 S/P TAVR (TRANSCATHETER AORTIC VALVE REPLACEMENT): ICD-10-CM

## 2021-11-10 DIAGNOSIS — T14.8XXA HEMATOMA: ICD-10-CM

## 2021-11-10 DIAGNOSIS — I10 ESSENTIAL HYPERTENSION: ICD-10-CM

## 2021-11-10 LAB
BH CV RIGHT GROIN PSA PROCEDURE SCRIPTING LRR: 1
BH CV XLRA MEAS EXT ILIAC A PSV RIGHT: 102 CM/SEC
MAXIMAL PREDICTED HEART RATE: 142 BPM
PROX PFA PSV RIGHT: 72 CM/SEC
PROX SFA PSV RIGHT: 131 CM/SEC
RIGHT GROIN CFA SYS: 99 CM/SEC
STRESS TARGET HR: 121 BPM

## 2021-11-10 PROCEDURE — 93926 LOWER EXTREMITY STUDY: CPT

## 2021-11-10 PROCEDURE — 93000 ELECTROCARDIOGRAM COMPLETE: CPT | Performed by: NURSE PRACTITIONER

## 2021-11-10 PROCEDURE — 99214 OFFICE O/P EST MOD 30 MIN: CPT | Performed by: NURSE PRACTITIONER

## 2021-11-10 NOTE — PROGRESS NOTES
Date of Office Visit: 11/10/2021  Encounter Provider: BLAZE Esquivel  Place of Service: [facility] CARDIOLOGY  Patient Name: Luis Emery  :1945    Chief Complaint   Patient presents with   • Aortic stenosis, severe   • Hospital Follow Up Visit     Post TAVR   :     HPI: Luis Emery is a 76 y.o. male who is a patient of  Dr. Yeimi Frederick and was referred to Dr. Vargas for TAVR for his aortic stenosis.  He has a previous history of essential hypertension, GERD and progressive dyspnea.  He was seen in the office on  and he had had a BECKI and cardiac cath as well as a CTA.  He was complaining of moderate dyspnea with activity and presented worse with walking up stairs.  He had a mean gradient of 41 mmHg and a valve area of 0.5 cm².  On BECKI his valve area was once again to decrease to 0.8.  His coronary angiogram did not show any significant coronary disease.    He was admitted on November 3 and underwent a 26 mm Medtronic evolute pro valve with Dr. Mireles and Dr. Hager.  He did very well postop and went home the following day.  He presents for 1 week follow-up today.    He is not having any shortness of breath or chest discomfort.  He has a lot of bruising to his groin site.  Also the other day he was bending over and got dizzy.  He has not tried to bend over since that time.  Previous testing and notes have been reviewed by me.   Past Medical History:   Diagnosis Date   • Abdominal pain     Impression: s/p recent cholecystecomy clinically improved   • Acute recurrent frontal sinusitis    • Acute upper respiratory infection    • Anemia     Impression: stable, Hgb 12.1   • Cancer (HCC)     prostate- removed surgically   • Chronic angle-closure glaucoma    • DNR (do not resuscitate)    • Essential hypertension    • Gastroesophageal reflux disease without esophagitis     Impression: Stable on current meds (alternating omeprazole and ranitidine). EGD 2017.   • Heart  murmur    • History of measles    • Hyperlipidemia    • Medicare annual wellness visit, subsequent    • Onychomycosis     Impression: Treatment options discussed. Prescription(s) as below. Medication(s) usage and side effects discussed.   • Overweight    • Personal history of malignant neoplasm of prostate    • Screening for depression     Negative Depression Screening (4 or less) ()   • Screening PSA (prostate specific antigen)     Impression: With history of prostate cancer. New urinary symptoms. Recheck PSA.   • Urinary frequency     Impression: Prescription(s) as below. Medication(s) usage and side effects discussed.       Past Surgical History:   Procedure Laterality Date   • ANKLE TENDON REPAIR  July 5th, 2017    Dr. Armijo   • AORTIC VALVE REPAIR/REPLACEMENT N/A 11/2/2021    Procedure: TTE TRANSFEMORAL TRANSCATHETER AORTIC VALVE REPLACEMENT with IntraOp TTE and possible open surgical rescue.;  Surgeon: Preston Hager MD;  Location: [facility] OR 18/19;  Service: Cardiothoracic;  Laterality: N/A;   • AORTIC VALVE REPAIR/REPLACEMENT N/A 11/2/2021    Procedure: Transfemoral Transcatheter Aortic Valve Replacement with IntraOp TTE and possible open surgical rescue.;  Surgeon: Manpreet Vargas MD;  Location: [facility] OR 18/19;  Service: Cardiovascular;  Laterality: N/A;   • CARDIAC CATHETERIZATION  2005    no stents   • CARDIAC CATHETERIZATION N/A 10/1/2021    Procedure: Right and Left Heart Cath with Coronar Angiography;  Surgeon: Luis Angel Frederick MD;  Location: [facility] INVASIVE LOCATION;  Service: Cardiovascular;  Laterality: N/A;   • CARDIAC CATHETERIZATION N/A 10/1/2021    Procedure: Aortic root aortogram;  Surgeon: Luis Angel Frederick MD;  Location: [facility] CATH INVASIVE LOCATION;  Service: Cardiovascular;  Laterality: N/A;   • CHOLECYSTECTOMY  03/2010    [facility].   • PROSTATECTOMY  01/1997    [facility].       Social History     Socioeconomic History    • Marital status:    • Number of children: 4   Tobacco Use   • Smoking status: Never Smoker   • Smokeless tobacco: Never Used   • Tobacco comment: caffeine use   Vaping Use   • Vaping Use: Never used   Substance and Sexual Activity   • Alcohol use: No   • Drug use: Never   • Sexual activity: Defer       Family History   Problem Relation Age of Onset   • Testicular cancer Father    • Coronary artery disease Father    • Hypertension Father    • Hypertension Other    • Cancer Mother    • Hypertension Sister    • Malig Hyperthermia Neg Hx        Review of Systems   Constitutional: Negative for diaphoresis and malaise/fatigue.   Cardiovascular: Negative for chest pain, claudication, dyspnea on exertion, irregular heartbeat, leg swelling, near-syncope, orthopnea, palpitations, paroxysmal nocturnal dyspnea and syncope.   Respiratory: Negative for cough, shortness of breath and sleep disturbances due to breathing.    Musculoskeletal: Negative for falls.   Neurological: Positive for dizziness. Negative for weakness.   Psychiatric/Behavioral: Negative for altered mental status and substance abuse.       Allergies   Allergen Reactions   • Nitroglycerin Unknown (See Comments)     chills         Current Outpatient Medications:   •  atorvastatin (LIPITOR) 20 MG tablet, Take 1 tablet by mouth once daily, Disp: 90 tablet, Rfl: 3  •  clopidogrel (PLAVIX) 75 MG tablet, Take 1 tablet by mouth Daily for 90 days., Disp: 30 tablet, Rfl: 2  •  glucosamine-chondroitin 500-400 MG per tablet, Take 1 tablet by mouth Daily., Disp: , Rfl:   •  Loratadine (CLARITIN) 10 MG capsule, Take 1 capsule by mouth Daily., Disp: , Rfl:   •  losartan (COZAAR) 100 MG tablet, Take 1 tablet by mouth once daily, Disp: 90 tablet, Rfl: 0  •  methylcellulose, Laxative, (CITRUCEL) 500 MG tablet tablet, Take 1 tablet by mouth 2 (two) times a day., Disp: , Rfl:   •  metoprolol succinate XL (TOPROL-XL) 50 MG 24 hr tablet, Take 1 tablet by mouth Daily for 90  "days., Disp: 30 tablet, Rfl: 2  •  Multiple Vitamin (MULTIVITAMINS PO), Take 1 capsule by mouth Daily., Disp: , Rfl:   •  Omega-3 Fatty Acids (FISH OIL CONCENTRATE) 300 MG capsule, Take 1 capsule by mouth Daily., Disp: , Rfl:   •  omeprazole (priLOSEC) 20 MG capsule, Take 1 capsule by mouth once daily, Disp: 90 capsule, Rfl: 2  •  Polyvinyl Alcohol-Povidone (REFRESH OP), Apply 1 drop to eye(s) as directed by provider 2 (Two) Times a Day., Disp: , Rfl:       Objective:     Vitals:    11/10/21 1237   BP: 138/84   BP Location: Left arm   Patient Position: Sitting   Pulse: 69   SpO2: 98%   Weight: 83.5 kg (184 lb)   Height: 170.2 cm (67\")     Body mass index is 28.82 kg/m².    PHYSICAL EXAM:    Constitutional:       General: Not in acute distress.     Appearance: Normal appearance. Well-developed.   Eyes:      Pupils: Pupils are equal, round, and reactive to light.   HENT:      Head: Normocephalic.   Neck:      Vascular: No carotid bruit or JVD.   Pulmonary:      Effort: Pulmonary effort is normal. No tachypnea.      Breath sounds: Normal breath sounds. No wheezing. No rales.   Cardiovascular:      Normal rate. Regular rhythm.      No gallop.      Comments: Right groin hematoma with bruising noted to both groins  Pulses:     Intact distal pulses.   Abdominal:      General: Bowel sounds are normal.      Palpations: Abdomen is soft.      Tenderness: There is no abdominal tenderness.   Musculoskeletal: Normal range of motion.      Cervical back: Normal range of motion and neck supple. No edema. Skin:     General: Skin is warm and dry.   Neurological:      Mental Status: Alert and oriented to person, place, and time.           ECG 12 Lead    Date/Time: 11/10/2021 12:45 PM  Performed by: Tiffani Kang APRN  Authorized by: Tiffani Kang APRN   Comparison: compared with previous ECG from 11/3/2021                Assessment:       Diagnosis Plan   1. Aortic stenosis, severe     2. Essential hypertension     3. Mixed " hyperlipidemia     4. S/P TAVR (transcatheter aortic valve replacement)     5. Hematoma     6. LBBB (left bundle branch block)  Holter Monitor - 48 Hour   7. Groin hematoma, initial encounter  Duplex Groin Pseudoaneurysm unilateral CAR   8. Groin pain, right     9. Postoperative hematoma involving circulatory system following other circulatory system procedure  Holter Monitor - 48 Hour    Duplex Groin Pseudoaneurysm unilateral CAR   10. Other specified complications of surgical and medical care, not elsewhere classified, initial encounter   Duplex Groin Pseudoaneurysm unilateral CAR     Orders Placed This Encounter   Procedures   • Holter Monitor - 48 Hour     Standing Status:   Future     Standing Expiration Date:   11/10/2022     Order Specific Question:   Reason for exam?     Answer:   Dizziness     Comments:   LBBB     Order Specific Question:   Release to patient     Answer:   Immediate   • ECG 12 Lead     This order was created via procedure documentation     Order Specific Question:   Release to patient     Answer:   Immediate          Plan:       We will get a get a 24-hour Holter he is got a new left bundle on his EKG.  I am also getting get a groin ultrasound I do not hear a bruit or feel a thrill but he does have a significant bump there and I would like to make sure there is no evidence of pseudoaneurysm.  I will call him with these results and he can keep his appointment with us in 1 month.         Your medication list          Accurate as of November 10, 2021  1:14 PM. If you have any questions, ask your nurse or doctor.            CONTINUE taking these medications      Instructions Last Dose Given Next Dose Due   atorvastatin 20 MG tablet  Commonly known as: LIPITOR      Take 1 tablet by mouth once daily       Claritin 10 MG capsule  Generic drug: Loratadine      Take 1 capsule by mouth Daily.       clopidogrel 75 MG tablet  Commonly known as: PLAVIX      Take 1 tablet by mouth Daily for 90 days.        Fish Oil Concentrate 300 MG capsule      Take 1 capsule by mouth Daily.       glucosamine-chondroitin 500-400 MG per tablet      Take 1 tablet by mouth Daily.       losartan 100 MG tablet  Commonly known as: COZAAR      Take 1 tablet by mouth once daily       methylcellulose (Laxative) 500 MG tablet tablet  Commonly known as: CITRUCEL      Take 1 tablet by mouth 2 (two) times a day.       metoprolol succinate XL 50 MG 24 hr tablet  Commonly known as: TOPROL-XL      Take 1 tablet by mouth Daily for 90 days.       MULTIVITAMINS PO      Take 1 capsule by mouth Daily.       omeprazole 20 MG capsule  Commonly known as: priLOSEC      Take 1 capsule by mouth once daily       REFRESH OP      Apply 1 drop to eye(s) as directed by provider 2 (Two) Times a Day.                As always, it has been a pleasure to participate in your patient's care.      Sincerely,     Tiffani THAKUR

## 2021-11-11 ENCOUNTER — TELEPHONE (OUTPATIENT)
Dept: CARDIOLOGY | Facility: CLINIC | Age: 76
End: 2021-11-11

## 2021-11-18 ENCOUNTER — TELEPHONE (OUTPATIENT)
Dept: CARDIOLOGY | Facility: CLINIC | Age: 76
End: 2021-11-18

## 2021-11-18 NOTE — TELEPHONE ENCOUNTER
Spoke with patient about groin ultrasound. Still small hematoma and I instructed him to continue with restrictions 1 more week.  Also waiting on Holter which I do not have the report yet

## 2021-11-19 ENCOUNTER — OFFICE VISIT (OUTPATIENT)
Dept: FAMILY MEDICINE CLINIC | Facility: CLINIC | Age: 76
End: 2021-11-19

## 2021-11-19 VITALS
OXYGEN SATURATION: 99 % | TEMPERATURE: 97.8 F | HEIGHT: 67 IN | HEART RATE: 84 BPM | DIASTOLIC BLOOD PRESSURE: 74 MMHG | RESPIRATION RATE: 18 BRPM | BODY MASS INDEX: 29.03 KG/M2 | WEIGHT: 185 LBS | SYSTOLIC BLOOD PRESSURE: 126 MMHG

## 2021-11-19 DIAGNOSIS — I10 ESSENTIAL HYPERTENSION: ICD-10-CM

## 2021-11-19 DIAGNOSIS — N18.2 STAGE 2 CHRONIC KIDNEY DISEASE: ICD-10-CM

## 2021-11-19 DIAGNOSIS — I35.0 AORTIC STENOSIS, SEVERE: ICD-10-CM

## 2021-11-19 DIAGNOSIS — Z78.9 FULL CODE STATUS: ICD-10-CM

## 2021-11-19 DIAGNOSIS — Z95.2 S/P TAVR (TRANSCATHETER AORTIC VALVE REPLACEMENT): ICD-10-CM

## 2021-11-19 DIAGNOSIS — Z00.00 MEDICARE ANNUAL WELLNESS VISIT, SUBSEQUENT: Primary | ICD-10-CM

## 2021-11-19 PROBLEM — Z12.5 SCREENING PSA (PROSTATE SPECIFIC ANTIGEN): Status: RESOLVED | Noted: 2019-09-12 | Resolved: 2021-11-19

## 2021-11-19 PROCEDURE — 1160F RVW MEDS BY RX/DR IN RCRD: CPT | Performed by: FAMILY MEDICINE

## 2021-11-19 PROCEDURE — 99213 OFFICE O/P EST LOW 20 MIN: CPT | Performed by: FAMILY MEDICINE

## 2021-11-19 PROCEDURE — G0439 PPPS, SUBSEQ VISIT: HCPCS | Performed by: FAMILY MEDICINE

## 2021-11-19 PROCEDURE — 1170F FXNL STATUS ASSESSED: CPT | Performed by: FAMILY MEDICINE

## 2021-11-29 DIAGNOSIS — I10 ESSENTIAL HYPERTENSION: ICD-10-CM

## 2021-11-29 RX ORDER — LOSARTAN POTASSIUM 100 MG/1
TABLET ORAL
Qty: 90 TABLET | Refills: 0 | Status: SHIPPED | OUTPATIENT
Start: 2021-11-29 | End: 2022-03-03 | Stop reason: SDUPTHER

## 2021-12-16 ENCOUNTER — HOSPITAL ENCOUNTER (OUTPATIENT)
Dept: CARDIOLOGY | Facility: HOSPITAL | Age: 76
Discharge: HOME | End: 2021-12-16

## 2021-12-16 ENCOUNTER — OFFICE VISIT (OUTPATIENT)
Dept: CARDIOLOGY | Facility: CLINIC | Age: 76
End: 2021-12-16

## 2021-12-16 ENCOUNTER — LAB (OUTPATIENT)
Dept: LAB | Facility: HOSPITAL | Age: 76
End: 2021-12-16

## 2021-12-16 VITALS
HEIGHT: 68 IN | SYSTOLIC BLOOD PRESSURE: 148 MMHG | HEART RATE: 74 BPM | BODY MASS INDEX: 27.65 KG/M2 | DIASTOLIC BLOOD PRESSURE: 80 MMHG | WEIGHT: 182.4 LBS

## 2021-12-16 VITALS
DIASTOLIC BLOOD PRESSURE: 84 MMHG | OXYGEN SATURATION: 98 % | BODY MASS INDEX: 29.03 KG/M2 | SYSTOLIC BLOOD PRESSURE: 138 MMHG | HEART RATE: 69 BPM | WEIGHT: 185 LBS | HEIGHT: 67 IN

## 2021-12-16 DIAGNOSIS — I50.32 CHRONIC DIASTOLIC CONGESTIVE HEART FAILURE (HCC): ICD-10-CM

## 2021-12-16 DIAGNOSIS — Z95.2 S/P TAVR (TRANSCATHETER AORTIC VALVE REPLACEMENT): Primary | ICD-10-CM

## 2021-12-16 DIAGNOSIS — I35.0 AORTIC STENOSIS, SEVERE: ICD-10-CM

## 2021-12-16 DIAGNOSIS — I10 ESSENTIAL HYPERTENSION: ICD-10-CM

## 2021-12-16 LAB
ALBUMIN SERPL-MCNC: 4.1 G/DL (ref 3.5–5.2)
ALBUMIN/GLOB SERPL: 1.5 G/DL
ALP SERPL-CCNC: 75 U/L (ref 39–117)
ALT SERPL W P-5'-P-CCNC: 17 U/L (ref 1–41)
ANION GAP SERPL CALCULATED.3IONS-SCNC: 5.6 MMOL/L (ref 5–15)
AORTIC ARCH: 2.5 CM
AORTIC DIMENSIONLESS INDEX: 0.5 (DI)
ASCENDING AORTA: 3.2 CM
AST SERPL-CCNC: 20 U/L (ref 1–40)
BH CV ECHO MEAS - ACS: 1.3 CM
BH CV ECHO MEAS - AO ARCH DIAM (PROXIMAL TRANS.): 2.5 CM
BH CV ECHO MEAS - AO MAX PG (FULL): 21.5 MMHG
BH CV ECHO MEAS - AO MAX PG: 28.3 MMHG
BH CV ECHO MEAS - AO MEAN PG (FULL): 7.4 MMHG
BH CV ECHO MEAS - AO MEAN PG: 10.9 MMHG
BH CV ECHO MEAS - AO ROOT AREA (BSA CORRECTED): 1
BH CV ECHO MEAS - AO ROOT AREA: 3.2 CM^2
BH CV ECHO MEAS - AO ROOT DIAM: 2 CM
BH CV ECHO MEAS - AO V2 MAX: 265.8 CM/SEC
BH CV ECHO MEAS - AO V2 MEAN: 155.3 CM/SEC
BH CV ECHO MEAS - AO V2 VTI: 49.1 CM
BH CV ECHO MEAS - ASC AORTA: 3.2 CM
BH CV ECHO MEAS - AVA(I,A): 1.1 CM^2
BH CV ECHO MEAS - AVA(I,D): 1.1 CM^2
BH CV ECHO MEAS - AVA(V,A): 1.1 CM^2
BH CV ECHO MEAS - AVA(V,D): 1.1 CM^2
BH CV ECHO MEAS - BSA(HAYCOCK): 2 M^2
BH CV ECHO MEAS - BSA: 2 M^2
BH CV ECHO MEAS - BZI_BMI: 29 KILOGRAMS/M^2
BH CV ECHO MEAS - BZI_METRIC_HEIGHT: 170.2 CM
BH CV ECHO MEAS - BZI_METRIC_WEIGHT: 83.9 KG
BH CV ECHO MEAS - DESC AORTA: 2.4 CM
BH CV ECHO MEAS - EDV(CUBED): 93.3 ML
BH CV ECHO MEAS - EDV(MOD-SP2): 39 ML
BH CV ECHO MEAS - EDV(MOD-SP4): 49 ML
BH CV ECHO MEAS - EDV(TEICH): 94.2 ML
BH CV ECHO MEAS - EF(CUBED): 80.4 %
BH CV ECHO MEAS - EF(MOD-BP): 54 %
BH CV ECHO MEAS - EF(MOD-SP2): 53.8 %
BH CV ECHO MEAS - EF(MOD-SP4): 55.1 %
BH CV ECHO MEAS - EF(TEICH): 73.1 %
BH CV ECHO MEAS - ESV(CUBED): 18.2 ML
BH CV ECHO MEAS - ESV(MOD-SP2): 18 ML
BH CV ECHO MEAS - ESV(MOD-SP4): 22 ML
BH CV ECHO MEAS - ESV(TEICH): 25.4 ML
BH CV ECHO MEAS - FS: 42 %
BH CV ECHO MEAS - IVS/LVPW: 0.93
BH CV ECHO MEAS - IVSD: 1.3 CM
BH CV ECHO MEAS - LAT PEAK E' VEL: 4.9 CM/SEC
BH CV ECHO MEAS - LV DIASTOLIC VOL/BSA (35-75): 25 ML/M^2
BH CV ECHO MEAS - LV MASS(C)D: 237.2 GRAMS
BH CV ECHO MEAS - LV MASS(C)DI: 121.2 GRAMS/M^2
BH CV ECHO MEAS - LV MAX PG: 6.7 MMHG
BH CV ECHO MEAS - LV MEAN PG: 3.5 MMHG
BH CV ECHO MEAS - LV SYSTOLIC VOL/BSA (12-30): 11.2 ML/M^2
BH CV ECHO MEAS - LV V1 MAX: 129.6 CM/SEC
BH CV ECHO MEAS - LV V1 MEAN: 85.4 CM/SEC
BH CV ECHO MEAS - LV V1 VTI: 24.1 CM
BH CV ECHO MEAS - LVIDD: 4.5 CM
BH CV ECHO MEAS - LVIDS: 2.6 CM
BH CV ECHO MEAS - LVLD AP2: 7 CM
BH CV ECHO MEAS - LVLD AP4: 7 CM
BH CV ECHO MEAS - LVLS AP2: 5.7 CM
BH CV ECHO MEAS - LVLS AP4: 5.4 CM
BH CV ECHO MEAS - LVOT AREA (M): 2.3 CM^2
BH CV ECHO MEAS - LVOT AREA: 2.2 CM^2
BH CV ECHO MEAS - LVOT DIAM: 1.7 CM
BH CV ECHO MEAS - LVPWD: 1.4 CM
BH CV ECHO MEAS - MED PEAK E' VEL: 5.4 CM/SEC
BH CV ECHO MEAS - MV A DUR: 0.13 SEC
BH CV ECHO MEAS - MV A MAX VEL: 100.1 CM/SEC
BH CV ECHO MEAS - MV DEC SLOPE: 315.9 CM/SEC^2
BH CV ECHO MEAS - MV DEC TIME: 0.23 SEC
BH CV ECHO MEAS - MV E MAX VEL: 66.2 CM/SEC
BH CV ECHO MEAS - MV E/A: 0.66
BH CV ECHO MEAS - MV MAX PG: 4.4 MMHG
BH CV ECHO MEAS - MV MEAN PG: 1.6 MMHG
BH CV ECHO MEAS - MV P1/2T MAX VEL: 90.9 CM/SEC
BH CV ECHO MEAS - MV P1/2T: 84.3 MSEC
BH CV ECHO MEAS - MV V2 MAX: 104.5 CM/SEC
BH CV ECHO MEAS - MV V2 MEAN: 58.6 CM/SEC
BH CV ECHO MEAS - MV V2 VTI: 26.8 CM
BH CV ECHO MEAS - MVA P1/2T LCG: 2.4 CM^2
BH CV ECHO MEAS - MVA(P1/2T): 2.6 CM^2
BH CV ECHO MEAS - MVA(VTI): 2 CM^2
BH CV ECHO MEAS - PA ACC TIME: 0.13 SEC
BH CV ECHO MEAS - PA MAX PG (FULL): 1.3 MMHG
BH CV ECHO MEAS - PA MAX PG: 3 MMHG
BH CV ECHO MEAS - PA PR(ACCEL): 20.8 MMHG
BH CV ECHO MEAS - PA V2 MAX: 86.9 CM/SEC
BH CV ECHO MEAS - PULM A REVS DUR: 0.13 SEC
BH CV ECHO MEAS - PULM A REVS VEL: 28.1 CM/SEC
BH CV ECHO MEAS - PULM DIAS VEL: 29 CM/SEC
BH CV ECHO MEAS - PULM S/D: 1.5
BH CV ECHO MEAS - PULM SYS VEL: 43.3 CM/SEC
BH CV ECHO MEAS - PVA(V,A): 1.8 CM^2
BH CV ECHO MEAS - PVA(V,D): 1.8 CM^2
BH CV ECHO MEAS - QP/QS: 0.47
BH CV ECHO MEAS - RAP SYSTOLE: 3 MMHG
BH CV ECHO MEAS - RV MAX PG: 1.7 MMHG
BH CV ECHO MEAS - RV MEAN PG: 0.72 MMHG
BH CV ECHO MEAS - RV V1 MAX: 66 CM/SEC
BH CV ECHO MEAS - RV V1 MEAN: 38.3 CM/SEC
BH CV ECHO MEAS - RV V1 VTI: 10.8 CM
BH CV ECHO MEAS - RVOT AREA: 2.3 CM^2
BH CV ECHO MEAS - RVOT DIAM: 1.7 CM
BH CV ECHO MEAS - SI(AO): 79.2 ML/M^2
BH CV ECHO MEAS - SI(CUBED): 38.4 ML/M^2
BH CV ECHO MEAS - SI(LVOT): 27.5 ML/M^2
BH CV ECHO MEAS - SI(MOD-SP2): 10.7 ML/M^2
BH CV ECHO MEAS - SI(MOD-SP4): 13.8 ML/M^2
BH CV ECHO MEAS - SI(TEICH): 35.2 ML/M^2
BH CV ECHO MEAS - SUP REN AO DIAM: 2.4 CM
BH CV ECHO MEAS - SV(AO): 154.9 ML
BH CV ECHO MEAS - SV(CUBED): 75.1 ML
BH CV ECHO MEAS - SV(LVOT): 53.9 ML
BH CV ECHO MEAS - SV(MOD-SP2): 21 ML
BH CV ECHO MEAS - SV(MOD-SP4): 27 ML
BH CV ECHO MEAS - SV(RVOT): 25.2 ML
BH CV ECHO MEAS - SV(TEICH): 68.8 ML
BH CV ECHO MEAS - TAPSE (>1.6): 2 CM
BH CV ECHO MEASUREMENTS AVERAGE E/E' RATIO: 12.85
BH CV XLRA - RV BASE: 2.7 CM
BH CV XLRA - RV LENGTH: 6.2 CM
BH CV XLRA - RV MID: 2.1 CM
BH CV XLRA - TDI S': 13.4 CM/SEC
BILIRUB SERPL-MCNC: 1.1 MG/DL (ref 0–1.2)
BUN SERPL-MCNC: 12 MG/DL (ref 8–23)
BUN/CREAT SERPL: 10.1 (ref 7–25)
CALCIUM SPEC-SCNC: 9.7 MG/DL (ref 8.6–10.5)
CHLORIDE SERPL-SCNC: 105 MMOL/L (ref 98–107)
CO2 SERPL-SCNC: 28.4 MMOL/L (ref 22–29)
CREAT SERPL-MCNC: 1.19 MG/DL (ref 0.76–1.27)
DEPRECATED RDW RBC AUTO: 44.6 FL (ref 37–54)
ERYTHROCYTE [DISTWIDTH] IN BLOOD BY AUTOMATED COUNT: 12.7 % (ref 12.3–15.4)
GFR SERPL CREATININE-BSD FRML MDRD: 59 ML/MIN/1.73
GLOBULIN UR ELPH-MCNC: 2.8 GM/DL
GLUCOSE SERPL-MCNC: 90 MG/DL (ref 65–99)
HCT VFR BLD AUTO: 41.8 % (ref 37.5–51)
HGB BLD-MCNC: 14 G/DL (ref 13–17.7)
LEFT ATRIUM VOLUME INDEX: 15 ML/M2
MAXIMAL PREDICTED HEART RATE: 142 BPM
MCH RBC QN AUTO: 31.8 PG (ref 26.6–33)
MCHC RBC AUTO-ENTMCNC: 33.5 G/DL (ref 31.5–35.7)
MCV RBC AUTO: 95 FL (ref 79–97)
PLATELET # BLD AUTO: 176 10*3/MM3 (ref 140–450)
PMV BLD AUTO: 9.1 FL (ref 6–12)
POTASSIUM SERPL-SCNC: 4.3 MMOL/L (ref 3.5–5.2)
PROT SERPL-MCNC: 6.9 G/DL (ref 6–8.5)
RBC # BLD AUTO: 4.4 10*6/MM3 (ref 4.14–5.8)
SINUS: 2.5 CM
SODIUM SERPL-SCNC: 139 MMOL/L (ref 136–145)
STJ: 2.7 CM
STRESS TARGET HR: 121 BPM
WBC NRBC COR # BLD: 6.57 10*3/MM3 (ref 3.4–10.8)

## 2021-12-16 PROCEDURE — 80053 COMPREHEN METABOLIC PANEL: CPT

## 2021-12-16 PROCEDURE — 36415 COLL VENOUS BLD VENIPUNCTURE: CPT

## 2021-12-16 PROCEDURE — 93306 TTE W/DOPPLER COMPLETE: CPT | Performed by: INTERNAL MEDICINE

## 2021-12-16 PROCEDURE — 93306 TTE W/DOPPLER COMPLETE: CPT

## 2021-12-16 PROCEDURE — 93000 ELECTROCARDIOGRAM COMPLETE: CPT | Performed by: INTERNAL MEDICINE

## 2021-12-16 PROCEDURE — 99214 OFFICE O/P EST MOD 30 MIN: CPT | Performed by: INTERNAL MEDICINE

## 2021-12-16 PROCEDURE — 85027 COMPLETE CBC AUTOMATED: CPT

## 2021-12-16 NOTE — PROGRESS NOTES
Luis Emery  1945  Date of Office Visit: 12/16/21  Encounter Provider: Manpreet Vargas MD  Place of Service: [facility] CARDIOLOGY      CHIEF COMPLAINT:  Severe degenerative aortic valve stenosis.  Nonrheumatic.  Status post transcatheter aortic valve replacement  Chronic diastolic heart failure  Essential hypertension    HISTORY OF PRESENT ILLNESS:  76-year-old male patient of Dr. Yeimi Frederick.  He is a very pleasant 76-year-old male with medical history of essential hypertension, gastroesophageal reflux disease and progressive dyspnea on exertion who presents to me secondary to severe aortic valve stenosis.  He underwent evaluation for transcatheter aortic valve replacement and subsequently underwent valve replacement with a 29 mm Evolut Pro self-expanding transcatheter aortic valve.  He did very well with that.  He had a left bundle branch block and subsequently underwent a Zio patch after with no evidence of high-grade AV block.  He states that his breathing is great and his lightheadedness is less frequent.    He did subsequently wear a Zio patch secondary to left bundle branch block after his transcatheter aortic valve replacement.  There was no significant AV block documented there.  His left bundle branch block has resolved on today's electrocardiogram.  He has no new complaints.      Review of Systems   Constitutional: Negative for fever and malaise/fatigue.   HENT: Negative for nosebleeds and sore throat.    Eyes: Negative for blurred vision and double vision.   Cardiovascular: Negative for chest pain, claudication, palpitations and syncope.   Respiratory: Negative for cough, shortness of breath and snoring.    Endocrine: Negative for cold intolerance, heat intolerance and polydipsia.   Skin: Negative for itching, poor wound healing and rash.   Musculoskeletal: Negative for joint pain, joint swelling, muscle weakness and myalgias.   Gastrointestinal: Negative for  abdominal pain, melena, nausea and vomiting.   Neurological: Negative for light-headedness, loss of balance, seizures, vertigo and weakness.   Psychiatric/Behavioral: Negative for altered mental status and depression.       Past Medical History:   Diagnosis Date   • Abdominal pain     Impression: s/p recent cholecystecomy clinically improved   • Acute recurrent frontal sinusitis    • Acute upper respiratory infection    • Anemia     Impression: stable, Hgb 12.1   • Cancer (HCC)     prostate- removed surgically   • Chronic angle-closure glaucoma    • DNR (do not resuscitate)    • Essential hypertension    • Gastroesophageal reflux disease without esophagitis     Impression: Stable on current meds (alternating omeprazole and ranitidine). EGD 1/2017.   • Heart murmur    • History of measles    • Hyperlipidemia    • Medicare annual wellness visit, subsequent    • Onychomycosis     Impression: Treatment options discussed. Prescription(s) as below. Medication(s) usage and side effects discussed.   • Overweight    • Personal history of malignant neoplasm of prostate    • Screening for depression     Negative Depression Screening (4 or less) ()   • Screening PSA (prostate specific antigen)     Impression: With history of prostate cancer. New urinary symptoms. Recheck PSA.   • Urinary frequency     Impression: Prescription(s) as below. Medication(s) usage and side effects discussed.       The following portions of the patient's history were reviewed and updated as appropriate: Social history , Family history and Surgical history     Current Outpatient Medications on File Prior to Visit   Medication Sig Dispense Refill   • atorvastatin (LIPITOR) 20 MG tablet Take 1 tablet by mouth once daily 90 tablet 3   • clopidogrel (PLAVIX) 75 MG tablet Take 1 tablet by mouth Daily for 90 days. 30 tablet 2   • glucosamine-chondroitin 500-400 MG per tablet Take 1 tablet by mouth Daily.     • Loratadine (CLARITIN) 10 MG capsule Take 1  "capsule by mouth Daily.     • losartan (COZAAR) 100 MG tablet Take 1 tablet by mouth once daily 90 tablet 0   • methylcellulose, Laxative, (CITRUCEL) 500 MG tablet tablet Take 1 tablet by mouth 2 (two) times a day.     • metoprolol succinate XL (TOPROL-XL) 50 MG 24 hr tablet Take 1 tablet by mouth Daily for 90 days. 30 tablet 2   • Multiple Vitamin (MULTIVITAMINS PO) Take 1 capsule by mouth Daily.     • Omega-3 Fatty Acids (FISH OIL CONCENTRATE) 300 MG capsule Take 1 capsule by mouth Daily.     • omeprazole (priLOSEC) 20 MG capsule Take 1 capsule by mouth once daily 90 capsule 2   • Polyvinyl Alcohol-Povidone (REFRESH OP) Apply 1 drop to eye(s) as directed by provider 2 (Two) Times a Day.       No current facility-administered medications on file prior to visit.       Allergies   Allergen Reactions   • Nitroglycerin Unknown (See Comments)     chills       Vitals:    12/16/21 1445   BP: 148/80   Pulse: 74   Weight: 82.7 kg (182 lb 6.4 oz)   Height: 172.7 cm (68\")     Body mass index is 27.73 kg/m².   Constitutional:       Appearance: Well-developed.   Eyes:      General: No scleral icterus.     Conjunctiva/sclera: Conjunctivae normal.   HENT:      Head: Normocephalic and atraumatic.   Neck:      Thyroid: No thyromegaly.      Vascular: Normal carotid pulses. No carotid bruit, hepatojugular reflux or JVD.      Trachea: No tracheal deviation.   Pulmonary:      Effort: No respiratory distress.      Breath sounds: Normal breath sounds. No decreased breath sounds. No wheezing. No rhonchi. No rales.   Chest:      Chest wall: Not tender to palpatation.   Cardiovascular:      Normal rate. Regular rhythm.      Murmurs: There is a grade 2/6 early systolic murmur.      No gallop.   Pulses:     Carotid: 2+ bilaterally.     Radial: 2+ bilaterally.     Femoral: 2+ bilaterally.     Dorsalis pedis: 2+ bilaterally.     Posterior tibial: 2+ bilaterally.  Edema:     Peripheral edema absent.   Abdominal:      General: Bowel sounds are " normal. There is no distension.      Palpations: Abdomen is soft.      Tenderness: There is no abdominal tenderness.   Musculoskeletal:         General: No deformity.      Cervical back: Normal range of motion and neck supple. Skin:     Findings: No erythema or rash.   Neurological:      Mental Status: Alert and oriented to person, place, and time.      Sensory: No sensory deficit.   Psychiatric:         Behavior: Behavior normal.         Lab Results   Component Value Date    WBC 6.57 12/16/2021    HGB 14.0 12/16/2021    HCT 41.8 12/16/2021    MCV 95.0 12/16/2021     12/16/2021       Lab Results   Component Value Date    GLUCOSE 90 12/16/2021    BUN 12 12/16/2021    CREATININE 1.19 12/16/2021    EGFRIFNONA 59 (L) 12/16/2021    EGFRIFAFRI 60 11/09/2020    BCR 10.1 12/16/2021    K 4.3 12/16/2021    CO2 28.4 12/16/2021    CALCIUM 9.7 12/16/2021    PROTENTOTREF 6.8 11/09/2020    ALBUMIN 4.10 12/16/2021    LABIL2 1.5 11/09/2020    AST 20 12/16/2021    ALT 17 12/16/2021       Lab Results   Component Value Date    GLUCOSE 90 12/16/2021    CALCIUM 9.7 12/16/2021     12/16/2021    K 4.3 12/16/2021    CO2 28.4 12/16/2021     12/16/2021    BUN 12 12/16/2021    CREATININE 1.19 12/16/2021    EGFRIFAFRI 60 11/09/2020    EGFRIFNONA 59 (L) 12/16/2021    BCR 10.1 12/16/2021    ANIONGAP 5.6 12/16/2021       Lab Results   Component Value Date    CHOL 106 09/29/2021    CHLPL 124 11/09/2020    TRIG 102 09/29/2021    HDL 38 (L) 09/29/2021    LDL 49 09/29/2021         ECG 12 Lead    Date/Time: 12/16/2021 3:12 PM  Performed by: Manpreet Vargas MD  Authorized by: Manpreet Vargas MD   Comparison: compared with previous ECG from 11/10/2021  Comparison to previous ECG: LBBB has resolved  Rhythm: sinus rhythm  Rate: normal  QRS axis: normal    Clinical impression: normal ECG             10/1/2021  No evidence for pulmonary hypertension is present.     Left ventricle angiogram was not performed due to inability  to cross the aortic valve.  Aortogram revealed normal-sized aorta with significantly reduced aortic valve motion.  No evidence for aortic regurgitation is present.     Left main coronary artery is normal.  Left anterior descending artery normal.  Circumflex coronary artery is normal.  Right coronary artery is normal (large and dominant)                Results for orders placed during the hospital encounter of 11/02/21    Adult Transthoracic Echo Limited W/ Cont if Necessary Per Protocol    Interpretation Summary  · Estimated left ventricular EF = 68% Left ventricular systolic function is normal.  · Left ventricular diastolic function was not assessed.  · There is a TAVR valve present.  · The aortic valve peak and mean gradients are within defined limits.  · No aortic valve regurgitation is present.    11/3/21  CONCLUSIONS:  Successful deployment of a 29 mm Medtronic Evolut Pro transcatheter aortic heart valve.    11/18/21  · A relatively benign monitor study.  Sinus rhythm with rare atrial and ventricular ectopic beats.  Left bundle branch block present.  2 patient triggered events with report of dizziness and shortness of breath.  Patient noted to be in a normal sinus rhythm during the triggered events with no arrhythmia      DISCUSSION/SUMMARY   76-year-old male with a medical history of severe degenerative aortic valve stenosis, dyspnea on exertion, hypertension and gastroesophageal reflux disease who presents back in follow-up after his transcatheter aortic valve replacement.  He underwent a successful 29 mm Medtronic evolut pro transcatheter aortic valve replacement in November 2021 with an excellent result.  He had a transient left bundle branch block which appears to have resolved on today's electrocardiogram.    1.  Severe degenerative aortic valve stenosis: Nonrheumatic.  Status post transcatheter aortic valve replacement as above with 29 mm Medtronic evolut pro valve  -I have reviewed his echocardiogram  today.  I think the valve looks great.  No paravalvular regurgitation and normal gradient across the aortic valve.  -Currently on Plavix monotherapy.  No changes.  -He will follow up with Dr. Frederick in 6 months and in need a repeat echo in 1 year.    2.  Essential hypertension: Well-controlled.  Continue losartan and metoprolol succinate therapy at current dose.  No hyperkalemia or renal insufficiency noted on evaluation of lab work.

## 2021-12-21 RX ORDER — OMEPRAZOLE 20 MG/1
CAPSULE, DELAYED RELEASE ORAL
Qty: 90 CAPSULE | Refills: 1 | Status: SHIPPED | OUTPATIENT
Start: 2021-12-21 | End: 2022-03-03 | Stop reason: SDUPTHER

## 2021-12-27 ENCOUNTER — TELEPHONE (OUTPATIENT)
Dept: FAMILY MEDICINE CLINIC | Facility: CLINIC | Age: 76
End: 2021-12-27

## 2021-12-27 DIAGNOSIS — I10 ESSENTIAL HYPERTENSION: Primary | ICD-10-CM

## 2021-12-27 RX ORDER — AMLODIPINE BESYLATE 2.5 MG/1
2.5 TABLET ORAL DAILY
Qty: 30 TABLET | Refills: 2 | Status: SHIPPED | OUTPATIENT
Start: 2021-12-27 | End: 2022-01-10

## 2021-12-27 NOTE — TELEPHONE ENCOUNTER
BP running high for a week 150 or 160/90 lowest bottom recently is 84.    Patient is asking if he needs to be seen? Or possibly a change in medication?

## 2022-01-10 ENCOUNTER — OFFICE VISIT (OUTPATIENT)
Dept: FAMILY MEDICINE CLINIC | Facility: CLINIC | Age: 77
End: 2022-01-10

## 2022-01-10 VITALS
BODY MASS INDEX: 27.66 KG/M2 | HEIGHT: 68 IN | TEMPERATURE: 97.6 F | SYSTOLIC BLOOD PRESSURE: 158 MMHG | RESPIRATION RATE: 18 BRPM | WEIGHT: 182.5 LBS | DIASTOLIC BLOOD PRESSURE: 82 MMHG | OXYGEN SATURATION: 99 % | HEART RATE: 64 BPM

## 2022-01-10 DIAGNOSIS — Z95.2 S/P TAVR (TRANSCATHETER AORTIC VALVE REPLACEMENT): ICD-10-CM

## 2022-01-10 DIAGNOSIS — I10 ESSENTIAL HYPERTENSION: Primary | ICD-10-CM

## 2022-01-10 DIAGNOSIS — E78.2 MIXED HYPERLIPIDEMIA: ICD-10-CM

## 2022-01-10 PROCEDURE — 99214 OFFICE O/P EST MOD 30 MIN: CPT | Performed by: FAMILY MEDICINE

## 2022-01-10 RX ORDER — AMLODIPINE BESYLATE 10 MG/1
10 TABLET ORAL DAILY
Qty: 90 TABLET | Refills: 1 | Status: SHIPPED | OUTPATIENT
Start: 2022-01-10 | End: 2022-02-17

## 2022-01-10 NOTE — PROGRESS NOTES
Chief Complaint   Patient presents with   • Hypertension     Subjective   Luis Emery is a 76 y.o. male.     Hypertension  This is a chronic problem. The current episode started more than 1 year ago. The problem has been waxing and waning since onset. The problem is uncontrolled. Associated symptoms include anxiety. Pertinent negatives include no blurred vision, chest pain, headaches, malaise/fatigue, neck pain, orthopnea, palpitations, peripheral edema, PND, shortness of breath or sweats. There are no associated agents to hypertension. Risk factors for coronary artery disease include male gender. Current antihypertension treatment includes calcium channel blockers, beta blockers and angiotensin blockers. The current treatment provides no improvement. There are no compliance problems.       Amlodipine 2.5 mg daily was added on 12/27/21 for BP running 150s-160s/80s at home.  BP not improved.  No medication side effects.    Answers for HPI/ROS submitted by the patient on 1/8/2022  What is the primary reason for your visit?: High Blood Pressure    I have reviewed relevant past medical, family, social and surgical history for this patient.  Medications review is done by myself, with patient.    Patient is participating in cardiac rehab.      Past Medical History :  Active Ambulatory Problems     Diagnosis Date Noted   • Personal history of prostate cancer 09/12/2019   • Onychomycosis 09/12/2019   • Increased frequency of urination 09/12/2019   • Hyperlipidemia 09/12/2019   • History of measles 09/12/2019   • Gastroesophageal reflux disease without esophagitis 09/12/2019   • Essential hypertension 09/12/2019   • Medicare annual wellness visit, subsequent 09/12/2019   • Chronic angle-closure glaucoma 09/12/2019   • Anemia 09/12/2019   • Heart murmur 02/20/2020   • Aortic stenosis, severe 02/20/2020   • Full code status 12/13/2020   • Bradycardia 09/28/2021   • S/P TAVR (transcatheter aortic valve replacement) 11/10/2021      Resolved Ambulatory Problems     Diagnosis Date Noted   • Influenza vaccine administered 09/12/2019   • Screening PSA (prostate specific antigen) 09/12/2019   • DNR (do not resuscitate) 09/12/2019     Past Medical History:   Diagnosis Date   • Abdominal pain    • Acute recurrent frontal sinusitis    • Acute upper respiratory infection    • Cancer (HCC)    • Cholelithiasis 3/29/2010   • Overweight    • Personal history of malignant neoplasm of prostate    • Screening for depression    • Urinary frequency        Medication List:    Current Outpatient Medications:   •  atorvastatin (LIPITOR) 20 MG tablet, Take 1 tablet by mouth once daily, Disp: 90 tablet, Rfl: 3  •  clopidogrel (PLAVIX) 75 MG tablet, Take 1 tablet by mouth Daily for 90 days., Disp: 30 tablet, Rfl: 2  •  glucosamine-chondroitin 500-400 MG per tablet, Take 1 tablet by mouth Daily., Disp: , Rfl:   •  Loratadine (CLARITIN) 10 MG capsule, Take 1 capsule by mouth Daily., Disp: , Rfl:   •  losartan (COZAAR) 100 MG tablet, Take 1 tablet by mouth once daily, Disp: 90 tablet, Rfl: 0  •  methylcellulose, Laxative, (CITRUCEL) 500 MG tablet tablet, Take 1 tablet by mouth 2 (two) times a day., Disp: , Rfl:   •  metoprolol succinate XL (TOPROL-XL) 50 MG 24 hr tablet, Take 1 tablet by mouth Daily for 90 days., Disp: 30 tablet, Rfl: 2  •  Multiple Vitamin (MULTIVITAMINS PO), Take 1 capsule by mouth Daily., Disp: , Rfl:   •  Omega-3 Fatty Acids (FISH OIL CONCENTRATE) 300 MG capsule, Take 1 capsule by mouth Daily., Disp: , Rfl:   •  omeprazole (priLOSEC) 20 MG capsule, Take 1 capsule by mouth once daily, Disp: 90 capsule, Rfl: 1  •  Polyvinyl Alcohol-Povidone (REFRESH OP), Apply 1 drop to eye(s) as directed by provider 2 (Two) Times a Day., Disp: , Rfl:   •  amLODIPine (NORVASC) 2.5 MG tablet, Take 1 tablet by mouth Daily., Disp: 30 tablet, Rfl: 2      Allergies   Allergen Reactions   • Nitroglycerin Unknown (See Comments)     chills       Social History     Tobacco  "Use   • Smoking status: Never Smoker   • Smokeless tobacco: Never Used   • Tobacco comment: caffeine use   Substance Use Topics   • Alcohol use: No       Review of Systems   Constitutional: Negative for malaise/fatigue.   Eyes: Negative for blurred vision.   Respiratory: Negative for shortness of breath.    Cardiovascular: Negative for chest pain, palpitations, orthopnea and PND.   Musculoskeletal: Negative for neck pain.         Objective   Vitals:    01/10/22 1451   BP: 158/82   BP Location: Right arm   Patient Position: Sitting   Cuff Size: Adult   Pulse: 64   Resp: 18   Temp: 97.6 °F (36.4 °C)   TempSrc: Temporal   SpO2: 99%   Weight: 82.8 kg (182 lb 8 oz)   Height: 171.5 cm (67.5\")     Body mass index is 28.16 kg/m².    Physical Exam  Constitutional:       General: He is not in acute distress.     Appearance: Normal appearance. He is well-developed. He is not ill-appearing.   HENT:      Head: Normocephalic and atraumatic.   Eyes:      General: No scleral icterus.        Right eye: No discharge.         Left eye: No discharge.      Extraocular Movements: Extraocular movements intact.      Conjunctiva/sclera: Conjunctivae normal.   Neck:      Thyroid: No thyromegaly.      Vascular: No JVD.      Trachea: No tracheal deviation.   Cardiovascular:      Rate and Rhythm: Normal rate and regular rhythm.      Pulses: Normal pulses.      Heart sounds: Murmur heard.   No gallop.    Pulmonary:      Effort: Pulmonary effort is normal. No respiratory distress.      Breath sounds: Normal breath sounds. No wheezing or rales.   Musculoskeletal:      Cervical back: Normal range of motion and neck supple.      Right lower leg: No edema.      Left lower leg: No edema.   Skin:     General: Skin is warm.      Capillary Refill: Capillary refill takes less than 2 seconds.      Findings: No erythema or rash.   Neurological:      Mental Status: He is alert and oriented to person, place, and time. Mental status is at baseline.      " Cranial Nerves: No cranial nerve deficit.      Motor: No weakness or abnormal muscle tone.      Coordination: Coordination normal.      Gait: Gait normal.   Psychiatric:         Mood and Affect: Mood normal.         Behavior: Behavior normal.         Thought Content: Thought content normal.         Judgment: Judgment normal.           Lab Results   Component Value Date    BUN 12 12/16/2021    CREATININE 1.19 12/16/2021    CREATININE 1.40 (H) 10/25/2021    EGFRIFNONA 59 (L) 12/16/2021     12/16/2021    K 4.3 12/16/2021     12/16/2021    CALCIUM 9.7 12/16/2021    ALBUMIN 4.10 12/16/2021    BILITOT 1.1 12/16/2021    ALKPHOS 75 12/16/2021    AST 20 12/16/2021    ALT 17 12/16/2021    WBC 6.57 12/16/2021    RBC 4.40 12/16/2021    HCT 41.8 12/16/2021    MCV 95.0 12/16/2021    MCH 31.8 12/16/2021    INR 1.05 10/29/2021          Assessment/Plan     Diagnoses and all orders for this visit:    1. Essential hypertension (Primary)  -     amLODIPine (NORVASC) 10 MG tablet; Take 1 tablet by mouth Daily.  Dispense: 90 tablet; Refill: 1  -     Comprehensive Metabolic Panel  -     CBC & Differential    2. S/P TAVR (transcatheter aortic valve replacement)    3. Mixed hyperlipidemia  -     Lipid Panel    Take amlodipine 5 mg daily for one week and if Systolic Blood Pressure not below 140, increase to 10 mg.  Call or f/u with readings in the next 2-3 weeks if not controlled.      F/U with Dr. Frederick in 5 months.  Continue clopidogrel until discontinued by cardiologist (he questions if he needs to take for just 90 days or take longer - he was advised to call cardiology for instructions regarding clopidogrel duration).    He will f/u in May or sooner if needed.  Fasting labs prior to next visit.    Patient was given instructions and counseling regarding his/her condition or for health maintenance advice. Please see specific information pulled into the AVS if appropriate.     I wore protective equipment throughout this patient  encounter to include mask. Hand hygiene was performed before donning protective equipment and after removal when leaving the room.

## 2022-01-20 ENCOUNTER — OFFICE VISIT (OUTPATIENT)
Dept: FAMILY MEDICINE CLINIC | Facility: CLINIC | Age: 77
End: 2022-01-20

## 2022-01-20 VITALS
RESPIRATION RATE: 18 BRPM | WEIGHT: 177.25 LBS | TEMPERATURE: 97.4 F | SYSTOLIC BLOOD PRESSURE: 150 MMHG | HEART RATE: 127 BPM | BODY MASS INDEX: 26.86 KG/M2 | OXYGEN SATURATION: 98 % | DIASTOLIC BLOOD PRESSURE: 84 MMHG | HEIGHT: 68 IN

## 2022-01-20 DIAGNOSIS — I10 ESSENTIAL HYPERTENSION: ICD-10-CM

## 2022-01-20 DIAGNOSIS — K52.9 ACUTE GASTROENTERITIS: Primary | ICD-10-CM

## 2022-01-20 PROCEDURE — 99213 OFFICE O/P EST LOW 20 MIN: CPT | Performed by: FAMILY MEDICINE

## 2022-01-20 PROCEDURE — 96372 THER/PROPH/DIAG INJ SC/IM: CPT | Performed by: FAMILY MEDICINE

## 2022-01-20 RX ORDER — ONDANSETRON 4 MG/1
4 TABLET, ORALLY DISINTEGRATING ORAL EVERY 8 HOURS PRN
Qty: 30 TABLET | Refills: 0 | Status: SHIPPED | OUTPATIENT
Start: 2022-01-20 | End: 2022-03-03 | Stop reason: SDUPTHER

## 2022-01-20 RX ORDER — ONDANSETRON 2 MG/ML
4 INJECTION INTRAMUSCULAR; INTRAVENOUS EVERY 6 HOURS PRN
Status: DISCONTINUED | OUTPATIENT
Start: 2022-01-20 | End: 2022-09-19

## 2022-01-20 RX ORDER — ONDANSETRON 2 MG/ML
4 INJECTION INTRAMUSCULAR; INTRAVENOUS ONCE
Status: COMPLETED | OUTPATIENT
Start: 2022-01-20 | End: 2022-01-20

## 2022-01-20 RX ADMIN — ONDANSETRON 4 MG: 2 INJECTION INTRAMUSCULAR; INTRAVENOUS at 17:10

## 2022-01-20 NOTE — PROGRESS NOTES
Chief Complaint   Patient presents with   • Vomiting   • Diarrhea   • Abdominal Pain   • Fatigue       Subjective   Luis MONGE Elder is a 76 y.o. male.     Vomiting   This is a new problem. The current episode started in the past 7 days. The problem occurs less than 2 times per day. The problem has been unchanged. The emesis has an appearance of stomach contents and bile. There has been no fever. Associated symptoms include abdominal pain, chills and diarrhea. Pertinent negatives include no chest pain, dizziness or fever. He has tried bed rest and increased fluids for the symptoms. The treatment provided mild relief.   Diarrhea   This is a new problem. The current episode started in the past 7 days. The problem occurs 2 to 4 times per day. The problem has been unchanged. The stool consistency is described as watery. The patient states that diarrhea does not awaken him from sleep. Associated symptoms include abdominal pain, bloating, chills, increased flatus and vomiting. Pertinent negatives include no fever. Nothing aggravates the symptoms. There are no known risk factors. He has tried increased fluids and electrolyte solution for the symptoms. The treatment provided mild relief.   Abdominal Pain  This is a new problem. The current episode started in the past 7 days. The onset quality is sudden. The problem occurs daily. The problem has been unchanged. The pain is located in the generalized abdominal region. The pain is at a severity of 4/10. The pain is mild. The quality of the pain is aching and cramping. The abdominal pain does not radiate. Associated symptoms include diarrhea, flatus, nausea and vomiting. Pertinent negatives include no constipation or fever. Nothing aggravates the pain. The pain is relieved by vomiting.   Fatigue  This is a new problem. The current episode started in the past 7 days. The problem occurs daily. The problem has been unchanged. Associated symptoms include abdominal pain, chills,  fatigue, nausea and vomiting. Pertinent negatives include no chest pain, fever, sore throat or weakness. Nothing aggravates the symptoms. He has tried rest, relaxation and drinking for the symptoms. The treatment provided mild relief.      Symptoms present for a couple of days.  He does not recall eating any food that smelled bad or may have been contaminated.  His wife ate the same beef stew the other night and she is not ill.  He initially had some bloody appearance of his vomit early, but no blood since has been noted.  No coffee ground emesis.  There is been no blood in his stools.    He has been able to drink fluids.  He is starting to eat again.      Patient has a prior history of bowel obstruction requiring hospitalization.  Presenting symptoms today are different than his prior symptoms.      Past Medical History :  Active Ambulatory Problems     Diagnosis Date Noted   • Personal history of prostate cancer 09/12/2019   • Onychomycosis 09/12/2019   • Increased frequency of urination 09/12/2019   • Hyperlipidemia 09/12/2019   • History of measles 09/12/2019   • Gastroesophageal reflux disease without esophagitis 09/12/2019   • Essential hypertension 09/12/2019   • Medicare annual wellness visit, subsequent 09/12/2019   • Chronic angle-closure glaucoma 09/12/2019   • Anemia 09/12/2019   • Heart murmur 02/20/2020   • Aortic stenosis, severe 02/20/2020   • Full code status 12/13/2020   • Bradycardia 09/28/2021   • S/P TAVR (transcatheter aortic valve replacement) 11/10/2021     Resolved Ambulatory Problems     Diagnosis Date Noted   • Influenza vaccine administered 09/12/2019   • Screening PSA (prostate specific antigen) 09/12/2019   • DNR (do not resuscitate) 09/12/2019     Past Medical History:   Diagnosis Date   • Abdominal pain    • Acute recurrent frontal sinusitis    • Acute upper respiratory infection    • Cancer (HCC)    • Cholelithiasis 3/29/2010   • Overweight    • Personal history of malignant neoplasm  of prostate    • Screening for depression    • Urinary frequency        Medication List:    Current Outpatient Medications:   •  amLODIPine (NORVASC) 10 MG tablet, Take 1 tablet by mouth Daily., Disp: 90 tablet, Rfl: 1  •  atorvastatin (LIPITOR) 20 MG tablet, Take 1 tablet by mouth once daily, Disp: 90 tablet, Rfl: 3  •  clopidogrel (PLAVIX) 75 MG tablet, Take 1 tablet by mouth Daily for 90 days., Disp: 30 tablet, Rfl: 2  •  glucosamine-chondroitin 500-400 MG per tablet, Take 1 tablet by mouth Daily., Disp: , Rfl:   •  Loratadine (CLARITIN) 10 MG capsule, Take 1 capsule by mouth Daily., Disp: , Rfl:   •  losartan (COZAAR) 100 MG tablet, Take 1 tablet by mouth once daily, Disp: 90 tablet, Rfl: 0  •  metoprolol succinate XL (TOPROL-XL) 50 MG 24 hr tablet, Take 1 tablet by mouth Daily for 90 days., Disp: 30 tablet, Rfl: 2  •  Multiple Vitamin (MULTIVITAMINS PO), Take 1 capsule by mouth Daily., Disp: , Rfl:   •  Omega-3 Fatty Acids (FISH OIL CONCENTRATE) 300 MG capsule, Take 1 capsule by mouth Daily., Disp: , Rfl:   •  omeprazole (priLOSEC) 20 MG capsule, Take 1 capsule by mouth once daily, Disp: 90 capsule, Rfl: 1  •  Polyvinyl Alcohol-Povidone (REFRESH OP), Apply 1 drop to eye(s) as directed by provider 2 (Two) Times a Day., Disp: , Rfl:       Allergies   Allergen Reactions   • Nitroglycerin Unknown (See Comments)     chills       Social History     Tobacco Use   • Smoking status: Never Smoker   • Smokeless tobacco: Never Used   • Tobacco comment: caffeine use   Substance Use Topics   • Alcohol use: No       Review of Systems   Constitutional: Positive for appetite change, chills and fatigue. Negative for fever.   HENT: Negative for sore throat and trouble swallowing.    Eyes: Negative for visual disturbance.   Respiratory: Negative for shortness of breath.    Cardiovascular: Negative for chest pain, palpitations and leg swelling.   Gastrointestinal: Positive for abdominal pain, bloating, diarrhea, flatus, nausea and  "vomiting. Negative for blood in stool and constipation.   Neurological: Negative for dizziness, seizures, syncope, facial asymmetry, weakness, light-headedness and headache.         Objective   Vitals:    01/20/22 1632   BP: 150/84   BP Location: Left arm   Patient Position: Sitting   Cuff Size: Adult   Pulse: (!) 127   Resp: 18   Temp: 97.4 °F (36.3 °C)   TempSrc: Temporal   SpO2: 98%   Weight: 80.4 kg (177 lb 4 oz)   Height: 171.5 cm (67.5\")     Body mass index is 27.35 kg/m².    Physical Exam  Constitutional:       General: He is not in acute distress.     Appearance: Normal appearance. He is well-developed. He is not ill-appearing or toxic-appearing.   HENT:      Head: Normocephalic and atraumatic.      Mouth/Throat:      Mouth: Mucous membranes are moist.   Eyes:      General: No scleral icterus.        Right eye: No discharge.         Left eye: No discharge.      Extraocular Movements: Extraocular movements intact.      Conjunctiva/sclera: Conjunctivae normal.   Cardiovascular:      Rate and Rhythm: Regular rhythm. Tachycardia present.      Heart sounds: Normal heart sounds. No murmur heard.      Pulmonary:      Effort: Pulmonary effort is normal.      Breath sounds: Normal breath sounds. No wheezing, rhonchi or rales.   Abdominal:      General: Bowel sounds are increased. There is no distension.      Palpations: Abdomen is soft.      Tenderness: There is no abdominal tenderness. There is no guarding or rebound. Negative signs include Alonso's sign.      Hernia: No hernia is present.      Comments: Nonsurgical abdomen on exam   Musculoskeletal:         General: No swelling.      Cervical back: Normal range of motion and neck supple.      Right lower leg: No edema.      Left lower leg: No edema.   Skin:     General: Skin is warm.      Capillary Refill: Capillary refill takes less than 2 seconds.      Findings: No rash.   Neurological:      General: No focal deficit present.      Mental Status: He is alert and " oriented to person, place, and time. Mental status is at baseline.   Psychiatric:         Mood and Affect: Mood normal.         Behavior: Behavior normal.         Thought Content: Thought content normal.           Lab Results   Component Value Date    BUN 12 12/16/2021    CREATININE 1.19 12/16/2021    CREATININE 1.40 (H) 10/25/2021    EGFRIFNONA 59 (L) 12/16/2021     12/16/2021    K 4.3 12/16/2021     12/16/2021    CALCIUM 9.7 12/16/2021    ALBUMIN 4.10 12/16/2021    BILITOT 1.1 12/16/2021    ALKPHOS 75 12/16/2021    AST 20 12/16/2021    ALT 17 12/16/2021    WBC 6.57 12/16/2021    RBC 4.40 12/16/2021    HCT 41.8 12/16/2021    MCV 95.0 12/16/2021    MCH 31.8 12/16/2021    INR 1.05 10/29/2021          Assessment/Plan     Diagnoses and all orders for this visit:    1. Acute gastroenteritis (Primary)  -     ondansetron ODT (ZOFRAN-ODT) 4 MG disintegrating tablet; Place 1 tablet on the tongue Every 8 (Eight) Hours As Needed for Nausea or Vomiting.  Dispense: 30 tablet; Refill: 0  -     ondansetron (ZOFRAN) injection 4 mg    2. Essential hypertension  Comments:  Blood pressure readings elevated with and without medication. Due to acute illness, he has been unable to take his blood pressure medications. Resume when able.    Acute gastroenteritis with no clear etiology.  He is gradually improving.  Push hydration fluids.  BRAT diet recommended.  He will be given a prescription for Zofran to take if needed.  Additionally, he was given a Zofran injection in the office.    He and wife were advised to give me an update on his condition tomorrow.  If he does not improve, will recommend we proceed with labs and imaging.    No follow-ups on file.       Patient was given instructions and counseling regarding his/her condition or for health maintenance advice. Please see specific information pulled into the AVS if appropriate.     I wore protective equipment throughout this patient encounter to include mask. Hand hygiene  was performed before donning protective equipment and after removal when leaving the room.     Patient updated me with status the next day: I’m feeling some better today and hopefully will continue to improve. No more nausea, knock on wood. Thanks

## 2022-02-17 ENCOUNTER — OFFICE VISIT (OUTPATIENT)
Dept: FAMILY MEDICINE CLINIC | Facility: CLINIC | Age: 77
End: 2022-02-17

## 2022-02-17 VITALS
RESPIRATION RATE: 18 BRPM | WEIGHT: 180 LBS | DIASTOLIC BLOOD PRESSURE: 78 MMHG | SYSTOLIC BLOOD PRESSURE: 130 MMHG | HEART RATE: 72 BPM | BODY MASS INDEX: 28.25 KG/M2 | TEMPERATURE: 97.7 F | HEIGHT: 67 IN | OXYGEN SATURATION: 99 %

## 2022-02-17 DIAGNOSIS — I10 ESSENTIAL HYPERTENSION: ICD-10-CM

## 2022-02-17 DIAGNOSIS — M79.89 LEG SWELLING: ICD-10-CM

## 2022-02-17 DIAGNOSIS — Z95.2 S/P TAVR (TRANSCATHETER AORTIC VALVE REPLACEMENT): ICD-10-CM

## 2022-02-17 DIAGNOSIS — I10 ESSENTIAL HYPERTENSION: Primary | ICD-10-CM

## 2022-02-17 DIAGNOSIS — M79.89 LEG SWELLING: Primary | ICD-10-CM

## 2022-02-17 PROCEDURE — 99213 OFFICE O/P EST LOW 20 MIN: CPT | Performed by: FAMILY MEDICINE

## 2022-02-17 RX ORDER — METOPROLOL SUCCINATE 50 MG/1
50 TABLET, EXTENDED RELEASE ORAL
Qty: 90 TABLET | Refills: 2 | Status: SHIPPED | OUTPATIENT
Start: 2022-02-17 | End: 2022-03-03 | Stop reason: SDUPTHER

## 2022-02-17 RX ORDER — AMLODIPINE BESYLATE 5 MG/1
5 TABLET ORAL DAILY
Qty: 90 TABLET | Refills: 0 | Status: SHIPPED | OUTPATIENT
Start: 2022-02-17 | End: 2022-05-15

## 2022-02-17 NOTE — PROGRESS NOTES
Chief Complaint   Patient presents with   • Headache   • Edema       Answers for HPI/ROS submitted by the patient on 2/12/2022  What is the primary reason for your visit?: Other  Please describe your symptoms.: Swollen ankles, Dizzy spells, Headaches  Have you had these symptoms before?: No  How long have you been having these symptoms?: 1-2 weeks      Subjective   Luis Emery is a 76 y.o. male.     Patient states headaches and swelling started when he stopped taking Plavix.  Headaches and dizziness are now all gone (last 5 days ago).    Headache   This is a new problem. The current episode started 1 to 4 weeks ago. The problem occurs intermittently. The problem has been unchanged. The pain is located in the occipital region. The pain does not radiate. The pain quality is not similar to prior headaches. The quality of the pain is described as aching. Associated symptoms include dizziness. Pertinent negatives include no coughing, fever or seizures. Nothing aggravates the symptoms. Treatments tried: closes eyes goes away within 5 mins. The treatment provided moderate relief.   Leg Swelling  This is a new problem. The current episode started 1 to 4 weeks ago. The problem occurs daily. The problem has been unchanged. Pertinent negatives include no chest pain, coughing, fever or rash. Nothing aggravates the symptoms. He has tried nothing (started amlodipine last month, titrated up to 10 mg) for the symptoms.      I have reviewed relevant past medical, family, social and surgical history for this patient.  Medications review is done by myself, with patient.      Past Medical History :  Active Ambulatory Problems     Diagnosis Date Noted   • Personal history of prostate cancer 09/12/2019   • Onychomycosis 09/12/2019   • Increased frequency of urination 09/12/2019   • Hyperlipidemia 09/12/2019   • History of measles 09/12/2019   • Gastroesophageal reflux disease without esophagitis 09/12/2019   • Essential hypertension  09/12/2019   • Medicare annual wellness visit, subsequent 09/12/2019   • Chronic angle-closure glaucoma 09/12/2019   • Anemia 09/12/2019   • Heart murmur 02/20/2020   • Aortic stenosis, severe 02/20/2020   • Full code status 12/13/2020   • Bradycardia 09/28/2021   • S/P TAVR (transcatheter aortic valve replacement) 11/10/2021     Resolved Ambulatory Problems     Diagnosis Date Noted   • Influenza vaccine administered 09/12/2019   • Screening PSA (prostate specific antigen) 09/12/2019   • DNR (do not resuscitate) 09/12/2019     Past Medical History:   Diagnosis Date   • Abdominal pain    • Acute recurrent frontal sinusitis    • Acute upper respiratory infection    • Cancer (HCC)    • Cholelithiasis 3/29/2010   • Overweight    • Personal history of malignant neoplasm of prostate    • Screening for depression    • Urinary frequency        Medication List:    Current Outpatient Medications:   •  atorvastatin (LIPITOR) 20 MG tablet, Take 1 tablet by mouth once daily, Disp: 90 tablet, Rfl: 3  •  glucosamine-chondroitin 500-400 MG per tablet, Take 1 tablet by mouth Daily., Disp: , Rfl:   •  Latanoprost (XELPROS OP), , Disp: , Rfl:   •  Loratadine (CLARITIN) 10 MG capsule, Take 1 capsule by mouth Daily., Disp: , Rfl:   •  losartan (COZAAR) 100 MG tablet, Take 1 tablet by mouth once daily, Disp: 90 tablet, Rfl: 0  •  methylcellulose, Laxative, (CITRUCEL) 500 MG tablet tablet, Take 1 tablet by mouth 2 (two) times a day., Disp: , Rfl:   •  Multiple Vitamin (MULTIVITAMINS PO), Take 1 capsule by mouth Daily., Disp: , Rfl:   •  Omega-3 Fatty Acids (FISH OIL CONCENTRATE) 300 MG capsule, Take 1 capsule by mouth Daily., Disp: , Rfl:   •  omeprazole (priLOSEC) 20 MG capsule, Take 1 capsule by mouth once daily, Disp: 90 capsule, Rfl: 1  •  ondansetron ODT (ZOFRAN-ODT) 4 MG disintegrating tablet, Place 1 tablet on the tongue Every 8 (Eight) Hours As Needed for Nausea or Vomiting., Disp: 30 tablet, Rfl: 0  •  Polyvinyl Alcohol-Povidone  "(REFRESH OP), Apply 1 drop to eye(s) as directed by provider 2 (Two) Times a Day., Disp: , Rfl:   •  amLODIPine (NORVASC) 10 MG tablet, Take 1 tablet by mouth Daily., Disp: 90 tablet, Rfl: 0  •  metoprolol succinate XL (TOPROL-XL) 50 MG 24 hr tablet, Take 1 tablet by mouth Daily for 90 days., Disp: 90 tablet, Rfl: 2      Allergies   Allergen Reactions   • Nitroglycerin Unknown (See Comments)     chills       Social History     Tobacco Use   • Smoking status: Never Smoker   • Smokeless tobacco: Never Used   • Tobacco comment: caffeine use   Substance Use Topics   • Alcohol use: No     Review of Systems   Constitutional: Positive for unexpected weight gain. Negative for activity change, appetite change and fever.   Respiratory: Negative for cough, shortness of breath and wheezing.    Cardiovascular: Positive for leg swelling. Negative for chest pain and palpitations.   Genitourinary: Negative for decreased urine volume and difficulty urinating.   Musculoskeletal:        Surgery left hand yesterday to release a contracture   Skin: Negative for rash.   Neurological: Positive for dizziness and headache. Negative for seizures and syncope.         Objective   Vitals:    02/17/22 0954   BP: 130/78   BP Location: Right arm   Patient Position: Sitting   Cuff Size: Adult   Pulse: 72   Resp: 18   Temp: 97.7 °F (36.5 °C)   TempSrc: Temporal   SpO2: 99%   Weight: 81.6 kg (180 lb)   Height: 170.2 cm (67\")     Body mass index is 28.19 kg/m².    Physical Exam  Constitutional:       General: He is not in acute distress.     Appearance: Normal appearance. He is well-developed.   HENT:      Head: Normocephalic and atraumatic.   Eyes:      General: No scleral icterus.        Right eye: No discharge.         Left eye: No discharge.      Extraocular Movements: Extraocular movements intact.      Conjunctiva/sclera: Conjunctivae normal.   Cardiovascular:      Rate and Rhythm: Normal rate and regular rhythm.      Heart sounds: Murmur (3/6 " systolic, right sternal border) heard.       Pulmonary:      Effort: Pulmonary effort is normal.      Breath sounds: Normal breath sounds. No wheezing, rhonchi or rales.   Musculoskeletal:      Cervical back: Normal range of motion and neck supple.      Right lower leg: Edema (2+, to mid shin) present.      Left lower leg: Edema (2+, to mid shin) present.      Comments: Left hand in bandage   Skin:     General: Skin is warm.      Capillary Refill: Capillary refill takes less than 2 seconds.      Findings: No rash.   Neurological:      General: No focal deficit present.      Mental Status: He is alert.      Cranial Nerves: No cranial nerve deficit.   Psychiatric:         Mood and Affect: Mood normal.         Behavior: Behavior normal.         Thought Content: Thought content normal.           Lab Results   Component Value Date    BUN 12 12/16/2021    CREATININE 1.19 12/16/2021    EGFRIFNONA 59 (L) 12/16/2021     12/16/2021    K 4.3 12/16/2021     12/16/2021    CALCIUM 9.7 12/16/2021    ALBUMIN 4.10 12/16/2021    BILITOT 1.1 12/16/2021    ALKPHOS 75 12/16/2021    AST 20 12/16/2021    ALT 17 12/16/2021    WBC 6.57 12/16/2021    RBC 4.40 12/16/2021    HCT 41.8 12/16/2021    MCV 95.0 12/16/2021    MCH 31.8 12/16/2021          Assessment/Plan     Diagnoses and all orders for this visit:    1. Essential hypertension (Primary)  -     metoprolol succinate XL (TOPROL-XL) 50 MG 24 hr tablet; Take 1 tablet by mouth Daily for 90 days.  Dispense: 90 tablet; Refill: 2  -     amLODIPine (NORVASC) 5 MG tablet; Take 1 tablet by mouth Daily.  Dispense: 90 tablet; Refill: 0    2. Leg swelling    3. S/P TAVR (transcatheter aortic valve replacement)    Reduce amlodipine to 5 mg daily.  Consider discontinuation.  Watch BP.  Consider diuretic or hydralazine if pressure increased.  F/U with me in 2 weeks to recheck, with cardiology if edema does not improve with medication changes.    Medication and medication adverse effects  discussed.  Drug education given and explained to patient. Patient verbalized understanding.  All questions presented by patient addressed.    Return in about 2 weeks (around 3/3/2022) for Recheck.       Patient was given instructions and counseling regarding his/her condition or for health maintenance advice. Please see specific information pulled into the AVS if appropriate.     I wore protective equipment throughout this patient encounter to include mask. Hand hygiene was performed before donning protective equipment and after removal when leaving the room.

## 2022-02-23 LAB
ALBUMIN SERPL-MCNC: 4.5 G/DL (ref 3.7–4.7)
ALBUMIN/GLOB SERPL: 1.8 {RATIO} (ref 1.2–2.2)
ALP SERPL-CCNC: 81 IU/L (ref 44–121)
ALT SERPL-CCNC: 20 IU/L (ref 0–44)
AST SERPL-CCNC: 21 IU/L (ref 0–40)
BASOPHILS # BLD AUTO: 0 X10E3/UL (ref 0–0.2)
BASOPHILS NFR BLD AUTO: 0 %
BILIRUB SERPL-MCNC: 1 MG/DL (ref 0–1.2)
BUN SERPL-MCNC: 17 MG/DL (ref 8–27)
BUN/CREAT SERPL: 15 (ref 10–24)
CALCIUM SERPL-MCNC: 9.9 MG/DL (ref 8.6–10.2)
CHLORIDE SERPL-SCNC: 101 MMOL/L (ref 96–106)
CO2 SERPL-SCNC: 23 MMOL/L (ref 20–29)
CREAT SERPL-MCNC: 1.11 MG/DL (ref 0.76–1.27)
EOSINOPHIL # BLD AUTO: 0.2 X10E3/UL (ref 0–0.4)
EOSINOPHIL NFR BLD AUTO: 2 %
ERYTHROCYTE [DISTWIDTH] IN BLOOD BY AUTOMATED COUNT: 12.5 % (ref 11.6–15.4)
GLOBULIN SER CALC-MCNC: 2.5 G/DL (ref 1.5–4.5)
GLUCOSE SERPL-MCNC: 93 MG/DL (ref 65–99)
HCT VFR BLD AUTO: 42.6 % (ref 37.5–51)
HGB BLD-MCNC: 14.7 G/DL (ref 13–17.7)
IMM GRANULOCYTES # BLD AUTO: 0 X10E3/UL (ref 0–0.1)
IMM GRANULOCYTES NFR BLD AUTO: 0 %
LYMPHOCYTES # BLD AUTO: 1.4 X10E3/UL (ref 0.7–3.1)
LYMPHOCYTES NFR BLD AUTO: 20 %
MCH RBC QN AUTO: 31.5 PG (ref 26.6–33)
MCHC RBC AUTO-ENTMCNC: 34.5 G/DL (ref 31.5–35.7)
MCV RBC AUTO: 91 FL (ref 79–97)
MONOCYTES # BLD AUTO: 0.7 X10E3/UL (ref 0.1–0.9)
MONOCYTES NFR BLD AUTO: 10 %
NEUTROPHILS # BLD AUTO: 4.6 X10E3/UL (ref 1.4–7)
NEUTROPHILS NFR BLD AUTO: 68 %
PLATELET # BLD AUTO: 223 X10E3/UL (ref 150–450)
POTASSIUM SERPL-SCNC: 4.5 MMOL/L (ref 3.5–5.2)
PROT SERPL-MCNC: 7 G/DL (ref 6–8.5)
RBC # BLD AUTO: 4.67 X10E6/UL (ref 4.14–5.8)
SODIUM SERPL-SCNC: 138 MMOL/L (ref 134–144)
TSH SERPL DL<=0.005 MIU/L-ACNC: 2.3 UIU/ML (ref 0.45–4.5)
WBC # BLD AUTO: 6.9 X10E3/UL (ref 3.4–10.8)

## 2022-03-03 ENCOUNTER — OFFICE VISIT (OUTPATIENT)
Dept: FAMILY MEDICINE CLINIC | Facility: CLINIC | Age: 77
End: 2022-03-03

## 2022-03-03 VITALS
WEIGHT: 179.25 LBS | HEART RATE: 69 BPM | RESPIRATION RATE: 18 BRPM | HEIGHT: 68 IN | DIASTOLIC BLOOD PRESSURE: 74 MMHG | TEMPERATURE: 97.5 F | BODY MASS INDEX: 27.17 KG/M2 | SYSTOLIC BLOOD PRESSURE: 132 MMHG | OXYGEN SATURATION: 98 %

## 2022-03-03 DIAGNOSIS — E78.2 MIXED HYPERLIPIDEMIA: ICD-10-CM

## 2022-03-03 DIAGNOSIS — M79.89 LEG SWELLING: ICD-10-CM

## 2022-03-03 DIAGNOSIS — H92.01 RIGHT EAR PAIN: ICD-10-CM

## 2022-03-03 DIAGNOSIS — E66.3 OVERWEIGHT WITH BODY MASS INDEX (BMI) OF 27 TO 27.9 IN ADULT: ICD-10-CM

## 2022-03-03 DIAGNOSIS — R11.0 NAUSEA: ICD-10-CM

## 2022-03-03 DIAGNOSIS — R42 DIZZINESS: ICD-10-CM

## 2022-03-03 DIAGNOSIS — I10 ESSENTIAL HYPERTENSION: Primary | ICD-10-CM

## 2022-03-03 DIAGNOSIS — K21.9 GASTROESOPHAGEAL REFLUX DISEASE WITHOUT ESOPHAGITIS: ICD-10-CM

## 2022-03-03 DIAGNOSIS — Z95.2 S/P TAVR (TRANSCATHETER AORTIC VALVE REPLACEMENT): ICD-10-CM

## 2022-03-03 PROCEDURE — 99214 OFFICE O/P EST MOD 30 MIN: CPT | Performed by: FAMILY MEDICINE

## 2022-03-03 RX ORDER — OMEPRAZOLE 20 MG/1
20 CAPSULE, DELAYED RELEASE ORAL DAILY
Qty: 90 CAPSULE | Refills: 2 | Status: SHIPPED | OUTPATIENT
Start: 2022-03-03 | End: 2022-09-16

## 2022-03-03 RX ORDER — LATANOPROST 50 UG/ML
SOLUTION/ DROPS OPHTHALMIC
COMMUNITY
Start: 2022-02-28

## 2022-03-03 RX ORDER — OFLOXACIN 3 MG/ML
5 SOLUTION AURICULAR (OTIC) 2 TIMES DAILY
Qty: 5 ML | Refills: 0 | Status: SHIPPED | OUTPATIENT
Start: 2022-03-03 | End: 2022-03-10

## 2022-03-03 RX ORDER — METOPROLOL SUCCINATE 50 MG/1
50 TABLET, EXTENDED RELEASE ORAL
Qty: 90 TABLET | Refills: 2 | Status: SHIPPED | OUTPATIENT
Start: 2022-03-03 | End: 2022-09-19 | Stop reason: SDUPTHER

## 2022-03-03 RX ORDER — LOSARTAN POTASSIUM 100 MG/1
100 TABLET ORAL DAILY
Qty: 90 TABLET | Refills: 2 | Status: SHIPPED | OUTPATIENT
Start: 2022-03-03 | End: 2022-10-10

## 2022-03-03 RX ORDER — ATORVASTATIN CALCIUM 20 MG/1
20 TABLET, FILM COATED ORAL DAILY
Qty: 90 TABLET | Refills: 2 | Status: SHIPPED | OUTPATIENT
Start: 2022-03-03 | End: 2022-10-10

## 2022-03-03 RX ORDER — AMLODIPINE BESYLATE 5 MG/1
5 TABLET ORAL DAILY
Qty: 90 TABLET | Refills: 0 | Status: CANCELLED | OUTPATIENT
Start: 2022-03-03

## 2022-03-03 RX ORDER — ONDANSETRON 4 MG/1
4 TABLET, ORALLY DISINTEGRATING ORAL EVERY 8 HOURS PRN
Qty: 30 TABLET | Refills: 2 | Status: SHIPPED | OUTPATIENT
Start: 2022-03-03 | End: 2022-09-19

## 2022-03-03 NOTE — PROGRESS NOTES
Chief Complaint   Patient presents with   • Hypertension       Subjective   Luis Emery is a 76 y.o. male.     Hypertension  This is a recurrent problem. The problem is unchanged. The problem is uncontrolled. Associated symptoms include headaches (improved), malaise/fatigue and peripheral edema. Pertinent negatives include no chest pain, palpitations or shortness of breath. (Dizziness x4 days) Risk factors for coronary artery disease include male gender and dyslipidemia.      Patient was in office 2/17/22 for HTN with leg swelling.  Amlodipine reduced to 5 mg daily.  Leg swelling improved but not resolved.  Wife is satisfied with BP readings at home.  No chest pain or SOA.    He is still having intermittent dizziness.  Not vertigo.  No trigger.  Not associated with chest pain, facial asymmetry, palpitations, weakness or position.    I have reviewed relevant past medical, family, social and surgical history for this patient.  Medications review is done by myself, with patient.      Past Medical History :  Active Ambulatory Problems     Diagnosis Date Noted   • Personal history of prostate cancer 09/12/2019   • Onychomycosis 09/12/2019   • Increased frequency of urination 09/12/2019   • Hyperlipidemia 09/12/2019   • History of measles 09/12/2019   • Gastroesophageal reflux disease without esophagitis 09/12/2019   • Essential hypertension 09/12/2019   • Medicare annual wellness visit, subsequent 09/12/2019   • Chronic angle-closure glaucoma 09/12/2019   • Anemia 09/12/2019   • Heart murmur 02/20/2020   • Aortic stenosis, severe 02/20/2020   • Full code status 12/13/2020   • Bradycardia 09/28/2021   • S/P TAVR (transcatheter aortic valve replacement) 11/10/2021     Resolved Ambulatory Problems     Diagnosis Date Noted   • Influenza vaccine administered 09/12/2019   • Screening PSA (prostate specific antigen) 09/12/2019   • DNR (do not resuscitate) 09/12/2019     Past Medical History:   Diagnosis Date   • Abdominal  pain    • Acute recurrent frontal sinusitis    • Acute upper respiratory infection    • Cancer (HCC)    • Cholelithiasis 3/29/2010   • Overweight    • Personal history of malignant neoplasm of prostate    • Screening for depression    • Urinary frequency        Medication List:    Current Outpatient Medications:   •  amLODIPine (NORVASC) 5 MG tablet, Take 1 tablet by mouth Daily., Disp: 90 tablet, Rfl: 0  •  atorvastatin (LIPITOR) 20 MG tablet, Take 1 tablet by mouth once daily, Disp: 90 tablet, Rfl: 3  •  glucosamine-chondroitin 500-400 MG per tablet, Take 1 tablet by mouth Daily., Disp: , Rfl:   •  latanoprost (XALATAN) 0.005 % ophthalmic solution, , Disp: , Rfl:   •  Loratadine (CLARITIN) 10 MG capsule, Take 1 capsule by mouth Daily., Disp: , Rfl:   •  losartan (COZAAR) 100 MG tablet, Take 1 tablet by mouth once daily, Disp: 90 tablet, Rfl: 0  •  methylcellulose, Laxative, (CITRUCEL) 500 MG tablet tablet, Take 1 tablet by mouth 2 (two) times a day., Disp: , Rfl:   •  metoprolol succinate XL (TOPROL-XL) 50 MG 24 hr tablet, Take 1 tablet by mouth Daily for 90 days., Disp: 90 tablet, Rfl: 2  •  Multiple Vitamin (MULTIVITAMINS PO), Take 1 capsule by mouth Daily., Disp: , Rfl:   •  Omega-3 Fatty Acids (FISH OIL CONCENTRATE) 300 MG capsule, Take 1 capsule by mouth Daily., Disp: , Rfl:   •  omeprazole (priLOSEC) 20 MG capsule, Take 1 capsule by mouth once daily, Disp: 90 capsule, Rfl: 1  •  ondansetron ODT (ZOFRAN-ODT) 4 MG disintegrating tablet, Place 1 tablet on the tongue Every 8 (Eight) Hours As Needed for Nausea or Vomiting., Disp: 30 tablet, Rfl: 0  •  Polyvinyl Alcohol-Povidone (REFRESH OP), Apply 1 drop to eye(s) as directed by provider 2 (Two) Times a Day., Disp: , Rfl:       Allergies   Allergen Reactions   • Nitroglycerin Unknown (See Comments)     chills       Social History     Tobacco Use   • Smoking status: Never Smoker   • Smokeless tobacco: Never Used   • Tobacco comment: caffeine use   Substance Use  "Topics   • Alcohol use: No     Review of Systems   Constitutional: Positive for malaise/fatigue. Negative for activity change, appetite change, fever and unexpected weight gain.   HENT: Negative for ear discharge and ear pain (right ear has been itching).    Eyes: Negative for visual disturbance.   Respiratory: Negative for cough, shortness of breath and wheezing.    Cardiovascular: Positive for leg swelling (better). Negative for chest pain and palpitations.   Genitourinary: Negative for decreased urine volume and difficulty urinating.   Musculoskeletal:        Surgery left hand yesterday to release a contracture   Skin: Negative for rash.   Neurological: Positive for dizziness. Negative for tremors, seizures, syncope, facial asymmetry, speech difficulty, weakness and headache (headaches now resolved off Plavix).         Objective   Vitals:    03/03/22 0939   BP: 132/74   BP Location: Left arm   Patient Position: Sitting   Cuff Size: Adult   Pulse: 69   Resp: 18   Temp: 97.5 °F (36.4 °C)   TempSrc: Temporal   SpO2: 98%   Weight: 81.3 kg (179 lb 4 oz)   Height: 171.5 cm (67.5\")     Body mass index is 27.66 kg/m².    Physical Exam  Constitutional:       General: He is not in acute distress.     Appearance: Normal appearance. He is well-developed.   HENT:      Head: Normocephalic and atraumatic.      Right Ear: Ear canal and external ear normal. No drainage or tenderness. Tympanic membrane is erythematous. Tympanic membrane is not perforated or bulging.      Left Ear: Ear canal and external ear normal. No drainage or tenderness. Tympanic membrane is not perforated, erythematous or bulging.      Ears:      Comments: He has been using Q-tips  Eyes:      General: No scleral icterus.        Right eye: No discharge.         Left eye: No discharge.      Extraocular Movements: Extraocular movements intact.      Conjunctiva/sclera: Conjunctivae normal.   Cardiovascular:      Rate and Rhythm: Normal rate and regular rhythm.    "   Heart sounds: Murmur (3/6 systolic, right sternal border) heard.       Pulmonary:      Effort: Pulmonary effort is normal.      Breath sounds: Normal breath sounds. No wheezing, rhonchi or rales.   Musculoskeletal:      Cervical back: Normal range of motion and neck supple.      Right lower leg: Edema (1+, lower 1/3 of leg) present.      Left lower leg: Edema (1+, lower 1/3 of leg) present.      Comments: LE edema improved over 2/17 visit   Skin:     General: Skin is warm.      Capillary Refill: Capillary refill takes less than 2 seconds.      Findings: No rash.   Neurological:      Mental Status: He is alert and oriented to person, place, and time. Mental status is at baseline.      Cranial Nerves: No cranial nerve deficit.   Psychiatric:         Mood and Affect: Mood normal.         Behavior: Behavior normal.         Thought Content: Thought content normal.           Lab Results   Component Value Date    BUN 17 02/22/2022    BUN 12 12/16/2021    CREATININE 1.11 02/22/2022    CREATININE 1.19 12/16/2021    EGFRIFNONA 63 02/22/2022    EGFRIFNONA 59 (L) 12/16/2021    EGFRIFAFRI 73 02/22/2022     02/22/2022     12/16/2021    K 4.5 02/22/2022    K 4.3 12/16/2021     02/22/2022     12/16/2021    CALCIUM 9.9 02/22/2022    CALCIUM 9.7 12/16/2021    ALBUMIN 4.5 02/22/2022    ALBUMIN 4.10 12/16/2021    BILITOT 1.0 02/22/2022    BILITOT 1.1 12/16/2021    ALKPHOS 81 02/22/2022    ALKPHOS 75 12/16/2021    AST 21 02/22/2022    AST 20 12/16/2021    ALT 20 02/22/2022    ALT 17 12/16/2021    WBC 6.9 02/22/2022    RBC 4.67 02/22/2022    HCT 42.6 02/22/2022    HCT 41.8 12/16/2021    MCV 91 02/22/2022    MCV 95.0 12/16/2021    MCH 31.5 02/22/2022    MCH 31.8 12/16/2021    TSH 2.300 02/22/2022          Assessment/Plan     Diagnoses and all orders for this visit:    1. Essential hypertension (Primary)  Comments:  Stable.  Continue current medications.  Orders:  -     losartan (COZAAR) 100 MG tablet; Take 1  tablet by mouth Daily.  Dispense: 90 tablet; Refill: 2  -     metoprolol succinate XL (TOPROL-XL) 50 MG 24 hr tablet; Take 1 tablet by mouth Daily for 90 days.  Dispense: 90 tablet; Refill: 2    2. Leg swelling  Comments:  Improved with reduced dosage of amlodipine. Use compression stockings, ambulate, elevate legs.  If swelling worsens or painful, consider d/c of amlodipine.    3. Nausea  -     ondansetron ODT (ZOFRAN-ODT) 4 MG disintegrating tablet; Place 1 tablet on the tongue Every 8 (Eight) Hours As Needed for Nausea or Vomiting.  Dispense: 30 tablet; Refill: 2    4. Mixed hyperlipidemia  Comments:  Stable.  Continue current medications.  Orders:  -     atorvastatin (LIPITOR) 20 MG tablet; Take 1 tablet by mouth Daily.  Dispense: 90 tablet; Refill: 2    5. Gastroesophageal reflux disease without esophagitis  Comments:  Stable.  Continue current medications.  Orders:  -     omeprazole (priLOSEC) 20 MG capsule; Take 1 capsule by mouth Daily.  Dispense: 90 capsule; Refill: 2    6. Right ear pain  Comments:  Avoid using Q-tips.  Orders:  -     ofloxacin (FLOXIN) 0.3 % otic solution; Administer 5 drops to the right ear 2 (Two) Times a Day for 7 days.  Dispense: 5 mL; Refill: 0    7. S/P TAVR (transcatheter aortic valve replacement)    8. Dizziness  Comments:  Consider MRI or CT if continues.    9. Overweight with body mass index (BMI) of 27 to 27.9 in adult        Medication and medication adverse effects discussed.  Drug education given and explained to patient. Patient verbalized understanding.  All questions presented by patient addressed.    Return in about 3 months (around 5/23/2022) for Recheck.       Patient was given instructions and counseling regarding his/her condition or for health maintenance advice. Please see specific information pulled into the AVS if appropriate.     I wore protective equipment throughout this patient encounter to include mask. Hand hygiene was performed before donning protective  equipment and after removal when leaving the room.

## 2022-03-25 ENCOUNTER — HOSPITAL ENCOUNTER (OUTPATIENT)
Dept: CT IMAGING | Facility: HOSPITAL | Age: 77
Discharge: HOME | End: 2022-03-25
Admitting: FAMILY MEDICINE

## 2022-03-25 DIAGNOSIS — R42 DIZZINESS: ICD-10-CM

## 2022-03-25 PROBLEM — Z95.0 PACEMAKER: Status: ACTIVE | Noted: 2022-03-25

## 2022-03-25 LAB
CREAT BLDA-MCNC: 1.3 MG/DL (ref 0.6–1.3)
EGFRCR SERPLBLD CKD-EPI 2021: 56.2 ML/MIN/1.73

## 2022-03-25 PROCEDURE — 70470 CT HEAD/BRAIN W/O & W/DYE: CPT

## 2022-03-25 PROCEDURE — 0 IOPAMIDOL PER 1 ML: Performed by: FAMILY MEDICINE

## 2022-03-25 PROCEDURE — 82565 ASSAY OF CREATININE: CPT

## 2022-03-25 RX ADMIN — IOPAMIDOL 100 ML: 755 INJECTION, SOLUTION INTRAVENOUS at 09:21

## 2022-04-07 ENCOUNTER — OFFICE VISIT (OUTPATIENT)
Dept: CARDIOLOGY | Facility: CLINIC | Age: 77
End: 2022-04-07

## 2022-04-07 VITALS
HEIGHT: 67 IN | WEIGHT: 179 LBS | SYSTOLIC BLOOD PRESSURE: 180 MMHG | HEART RATE: 60 BPM | OXYGEN SATURATION: 98 % | BODY MASS INDEX: 28.09 KG/M2 | DIASTOLIC BLOOD PRESSURE: 82 MMHG

## 2022-04-07 DIAGNOSIS — Z95.3 S/P TAVR (TRANSCATHETER AORTIC VALVE REPLACEMENT), BIOPROSTHETIC: Primary | ICD-10-CM

## 2022-04-07 DIAGNOSIS — I44.7 LBBB (LEFT BUNDLE BRANCH BLOCK): ICD-10-CM

## 2022-04-07 DIAGNOSIS — Z95.2 S/P TAVR (TRANSCATHETER AORTIC VALVE REPLACEMENT): ICD-10-CM

## 2022-04-07 DIAGNOSIS — R55 NEAR SYNCOPE: ICD-10-CM

## 2022-04-07 DIAGNOSIS — I10 ESSENTIAL HYPERTENSION: ICD-10-CM

## 2022-04-07 DIAGNOSIS — E78.2 MIXED HYPERLIPIDEMIA: ICD-10-CM

## 2022-04-07 PROCEDURE — 99214 OFFICE O/P EST MOD 30 MIN: CPT | Performed by: INTERNAL MEDICINE

## 2022-04-07 NOTE — PROGRESS NOTES
Encounter Date:04/07/2022      Patient ID: Luis Emery is a 76 y.o. male.    Chief Complaint:  Status post TAVR  Aortic valve stenosis  Bradycardia  Hypertension  Dyslipidemia  Dizziness     History of Present Illness  Patient had TAVR 11/3/2021-  Patient had problems with post implant dizziness and monitoring was negative for any dysrhythmia.  .  Recently patient has been having dizziness and near syncopal feeling again.  No other associated aggravating or elevating factors.    Shortness of breath has significantly improved since patient had TAVR.    Since I have last seen, the patient has been without any chest discomfort ,shortness of breath, palpitations, or syncope.  Denies having any headache ,abdominal pain ,nausea, vomiting , diarrhea constipation, loss of weight or loss of appetite.  Denies having any excessive bruising ,hematuria or blood in the stool.    Review of all systems negative except as indicated.    Reviewed ROS.  Assessment and Plan      ]]]]]]]]]]]]]]]]]]  Impression  ==========  Status post TAVR 11/3/2021-  (29 mm Evolut Pro self-expanding transcatheter aortic valve Medtronic)    History of significant aortic valve stenosis with gradient of 66 (peak to peak) and 31 mean gradient and valve area of 1.2 cm²-dizziness and near syncopal feeling.  Echocardiogram 9/27/2021 revealed  Normal left ventricle function significant aortic valve stenosis with valve area of 0.4-0.5 cm².  Gradient 50/35 mmHg.    Cardiac catheterization 10/1/2021  No evidence for pulmonary hypertension is present.  Left ventricle angiogram was not performed due to inability to cross the aortic valve.  Aortogram revealed normal-sized aorta with significantly reduced aortic valve motion.  No evidence for aortic regurgitation is present.  Left main coronary artery is normal.  Left anterior descending artery normal.  Circumflex coronary artery is normal.  Right coronary artery is normal (large and dominant)              Transesophageal echocardiogram 10/1/2021 revealed    Significant aortic valve stenosis with valve area of 0.8 cm².  Left ventricle is normal in size and contractility.  Ejection fraction 60%.  Mild mitral and aortic regurgitation is present.  No pericardial effusion or intracardiac thrombus is seen       · -Sinus bradycardia  ·    · -Hypertension and dyslipidemia  ·    · -History of prostate cancer.  ·    · -Status post cholecystectomy  ·    · - Allergic to nitroglycerin  ·    · -Non-smoker  · =============  · Plan  · =========  · Dizziness and near syncope.  · Patient had transient left bundle branch block after TAVR.  · Monitor was negative at that time.  · Patient is having more dizziness and near syncopal feeling lately.  · 30-day event monitor.  · Echocardiogram  ·   · Status post TAVR  · 3/20/2021  · Significant aortic valve stenosis-having dizziness and near syncope.  ·    · Hypertension-180/80  · Continue losartan metoprolol  ·    · Dyslipidemia-continue atorvastatin  ·    · Sinus bradycardia-30-day monitor.  ·   · Follow-up in the office in 2 weeks with an echocardiogram.  · Consider pacemaker implantation if patient has significant AV blocks or significant bradycardia  ·   · Further plan will depend on patient's progress.  · [[[[[[[[[[[[[[[                   Diagnosis Plan   1. S/p TAVR (transcatheter aortic valve replacement), bioprosthetic  [facility] Outpatient Telemetry    Adult Transthoracic Echo Complete W/ Cont if Necessary Per Protocol   2. Near syncope  Mobile [facility]iac Outpatient Telemetry    Adult Transthoracic Echo Complete W/ Cont if Necessary Per Protocol   3. S/P TAVR (transcatheter aortic valve replacement)     4. Essential hypertension     5. LBBB (left bundle branch block)     6. Mixed hyperlipidemia     LAB RESULTS (LAST 7 DAYS)    CBC        BMP        CMP         BNP        TROPONIN        CoAg        Creatinine Clearance  Estimated Creatinine Clearance: 47.8 mL/min (by C-G  formula based on SCr of 1.3 mg/dL).    ABG        Radiology  No radiology results for the last day                The following portions of the patient's history were reviewed and updated as appropriate: allergies, current medications, past family history, past medical history, past social history, past surgical history and problem list.    Review of Systems   Constitutional: Negative for malaise/fatigue.   Cardiovascular: Positive for leg swelling. Negative for chest pain, palpitations and syncope.   Respiratory: Negative for shortness of breath.    Skin: Negative for rash.   Gastrointestinal: Negative for nausea and vomiting.   Neurological: Positive for dizziness. Negative for light-headedness and numbness.   All other systems reviewed and are negative.        Current Outpatient Medications:   •  atorvastatin (LIPITOR) 20 MG tablet, Take 1 tablet by mouth Daily., Disp: 90 tablet, Rfl: 2  •  glucosamine-chondroitin 500-400 MG per tablet, Take 1 tablet by mouth Daily., Disp: , Rfl:   •  latanoprost (XALATAN) 0.005 % ophthalmic solution, , Disp: , Rfl:   •  Loratadine 10 MG capsule, Take 1 capsule by mouth Daily., Disp: , Rfl:   •  losartan (COZAAR) 100 MG tablet, Take 1 tablet by mouth Daily., Disp: 90 tablet, Rfl: 2  •  methylcellulose, Laxative, (CITRUCEL) 500 MG tablet tablet, Take 1 tablet by mouth 2 (two) times a day., Disp: , Rfl:   •  metoprolol succinate XL (TOPROL-XL) 50 MG 24 hr tablet, Take 1 tablet by mouth Daily for 90 days., Disp: 90 tablet, Rfl: 2  •  Multiple Vitamin (MULTIVITAMINS PO), Take 1 capsule by mouth Daily., Disp: , Rfl:   •  Omega-3 Fatty Acids (FISH OIL CONCENTRATE) 300 MG capsule, Take 1 capsule by mouth Daily., Disp: , Rfl:   •  omeprazole (priLOSEC) 20 MG capsule, Take 1 capsule by mouth Daily., Disp: 90 capsule, Rfl: 2  •  ondansetron ODT (ZOFRAN-ODT) 4 MG disintegrating tablet, Place 1 tablet on the tongue Every 8 (Eight) Hours As Needed for Nausea or Vomiting., Disp: 30 tablet,  Rfl: 2  •  Polyvinyl Alcohol-Povidone (REFRESH OP), Apply 1 drop to eye(s) as directed by provider 2 (Two) Times a Day., Disp: , Rfl:   •  amLODIPine (NORVASC) 5 MG tablet, Take 1 tablet by mouth Daily., Disp: 90 tablet, Rfl: 0    Current Facility-Administered Medications:   •  ondansetron (ZOFRAN) injection 4 mg, 4 mg, Intravenous, Q6H PRN, Selena White MD    Allergies   Allergen Reactions   • Nitroglycerin Unknown (See Comments)     chills       Family History   Problem Relation Age of Onset   • Testicular cancer Father    • Coronary artery disease Father    • Hypertension Father    • Hypertension Other    • Cancer Mother    • Hypertension Sister    • Malig Hyperthermia Neg Hx        Past Surgical History:   Procedure Laterality Date   • ANKLE TENDON REPAIR  July 5th, 2017    Dr. Armijo   • AORTIC VALVE REPAIR/REPLACEMENT N/A 11/2/2021    Procedure: TTE TRANSFEMORAL TRANSCATHETER AORTIC VALVE REPLACEMENT with IntraOp TTE and possible open surgical rescue.;  Surgeon: Preston Hager MD;  Location: [facility] OR 18/19;  Service: Cardiothoracic;  Laterality: N/A;   • AORTIC VALVE REPAIR/REPLACEMENT N/A 11/2/2021    Procedure: Transfemoral Transcatheter Aortic Valve Replacement with IntraOp TTE and possible open surgical rescue.;  Surgeon: Manpreet Vargas MD;  Location: [facility] OR 18/19;  Service: Cardiovascular;  Laterality: N/A;   • CARDIAC CATHETERIZATION  2005    no stents   • CARDIAC CATHETERIZATION N/A 10/1/2021    Procedure: Right and Left Heart Cath with Coronar Angiography;  Surgeon: Luis Angel Frederick MD;  Location: [facility] CATH INVASIVE LOCATION;  Service: Cardiovascular;  Laterality: N/A;   • CARDIAC CATHETERIZATION N/A 10/1/2021    Procedure: Aortic root aortogram;  Surgeon: LuisA ngel Frederick MD;  Location: [facility] CATH INVASIVE LOCATION;  Service: Cardiovascular;  Laterality: N/A;   • CARDIAC VALVE REPLACEMENT  11/2/21   • CHOLECYSTECTOMY  03/2010    [facility].   • PROSTATECTOMY  01/1997    [facility].       Past Medical History:   Diagnosis Date   • Abdominal pain     Impression: s/p recent cholecystecomy clinically improved   • Acute recurrent frontal sinusitis    • Acute upper respiratory infection    • Anemia     Impression: stable, Hgb 12.1   • Cancer (HCC)     prostate- removed surgically   • Cholelithiasis 3/29/2010    Removed   • Chronic angle-closure glaucoma    • DNR (do not resuscitate)    • Essential hypertension    • Gastroesophageal reflux disease without esophagitis     Impression: Stable on current meds (alternating omeprazole and ranitidine). EGD 1/2017.   • Heart murmur    • History of measles    • Hyperlipidemia    • Medicare annual wellness visit, subsequent    • Onychomycosis     Impression: Treatment options discussed. Prescription(s) as below. Medication(s) usage and side effects discussed.   • Overweight    • Personal history of malignant neoplasm of prostate    • Screening for depression     Negative Depression Screening (4 or less) ()   • Screening PSA (prostate specific antigen)     Impression: With history of prostate cancer. New urinary symptoms. Recheck PSA.   • Urinary frequency     Impression: Prescription(s) as below. Medication(s) usage and side effects discussed.       Family History   Problem Relation Age of Onset   • Testicular cancer Father    • Coronary artery disease Father    • Hypertension Father    • Hypertension Other    • Cancer Mother    • Hypertension Sister    • Malig Hyperthermia Neg Hx        Social History     Socioeconomic History   • Marital status:    • Number of children: 4   Tobacco Use   • Smoking status: Never Smoker   • Smokeless tobacco: Never Used   • Tobacco comment: caffeine use   Vaping Use   • Vaping Use: Never used   Substance and Sexual Activity   • Alcohol use: No   • Drug use: Never   • Sexual activity: Defer         Procedures      Objective:       Physical Exam    BP  "180/82 (BP Location: Right arm, Patient Position: Sitting, Cuff Size: Adult)   Pulse 60   Ht 170.2 cm (67\")   Wt 81.2 kg (179 lb)   SpO2 98%   BMI 28.04 kg/m²   The patient is alert, oriented and in no distress.    Vital signs as noted above.    Head and neck revealed no carotid bruits or jugular venous distension.  No thyromegaly or lymphadenopathy is present.    Lungs clear.  No wheezing.  Breath sounds are normal bilaterally.    Heart normal first and second heart sounds.  No murmur..  No pericardial rub is present.  No gallop is present.    Abdomen soft and nontender.  No organomegaly is present.    Extremities revealed good peripheral pulses without any pedal edema.    Skin warm and dry.    Musculoskeletal system is grossly normal.    CNS grossly normal.    Reviewed and updated.        "

## 2022-04-25 ENCOUNTER — HOSPITAL ENCOUNTER (OUTPATIENT)
Dept: CARDIOLOGY | Facility: HOSPITAL | Age: 77
Discharge: HOME | End: 2022-04-25
Admitting: INTERNAL MEDICINE

## 2022-04-25 ENCOUNTER — OFFICE VISIT (OUTPATIENT)
Dept: CARDIOLOGY | Facility: CLINIC | Age: 77
End: 2022-04-25

## 2022-04-25 VITALS
HEIGHT: 67 IN | WEIGHT: 180 LBS | SYSTOLIC BLOOD PRESSURE: 199 MMHG | BODY MASS INDEX: 28.25 KG/M2 | DIASTOLIC BLOOD PRESSURE: 88 MMHG | OXYGEN SATURATION: 99 % | HEART RATE: 57 BPM

## 2022-04-25 VITALS — WEIGHT: 179 LBS | HEIGHT: 67 IN | BODY MASS INDEX: 28.09 KG/M2

## 2022-04-25 DIAGNOSIS — I44.7 LBBB (LEFT BUNDLE BRANCH BLOCK): ICD-10-CM

## 2022-04-25 DIAGNOSIS — R55 NEAR SYNCOPE: ICD-10-CM

## 2022-04-25 DIAGNOSIS — Z95.3 S/P TAVR (TRANSCATHETER AORTIC VALVE REPLACEMENT), BIOPROSTHETIC: ICD-10-CM

## 2022-04-25 DIAGNOSIS — Z95.3 S/P TAVR (TRANSCATHETER AORTIC VALVE REPLACEMENT), BIOPROSTHETIC: Primary | ICD-10-CM

## 2022-04-25 DIAGNOSIS — E78.2 MIXED HYPERLIPIDEMIA: ICD-10-CM

## 2022-04-25 DIAGNOSIS — I10 ESSENTIAL HYPERTENSION: ICD-10-CM

## 2022-04-25 LAB
BH CV ECHO MEAS - AO MAX PG: 16.1 MMHG
BH CV ECHO MEAS - AO MEAN PG: 11 MMHG
BH CV ECHO MEAS - AO ROOT DIAM: 3 CM
BH CV ECHO MEAS - AO V2 MAX: 199 CM/SEC
BH CV ECHO MEAS - AO V2 VTI: 42.8 CM
BH CV ECHO MEAS - AVA(I,D): 1.26 CM2
BH CV ECHO MEAS - EDV(CUBED): 58.7 ML
BH CV ECHO MEAS - EDV(MOD-SP2): 43.9 ML
BH CV ECHO MEAS - EDV(MOD-SP4): 39.4 ML
BH CV ECHO MEAS - EF(MOD-SP2): 64.7 %
BH CV ECHO MEAS - EF(MOD-SP4): 57.5 %
BH CV ECHO MEAS - ESV(CUBED): 2.8 ML
BH CV ECHO MEAS - ESV(MOD-SP2): 15.5 ML
BH CV ECHO MEAS - ESV(MOD-SP4): 16.7 ML
BH CV ECHO MEAS - FS: 63.9 %
BH CV ECHO MEAS - IVS/LVPW: 0.87 CM
BH CV ECHO MEAS - IVSD: 1.16 CM
BH CV ECHO MEAS - LV MASS(C)D: 168 GRAMS
BH CV ECHO MEAS - LV MAX PG: 4 MMHG
BH CV ECHO MEAS - LV MEAN PG: 2.09 MMHG
BH CV ECHO MEAS - LV V1 MAX: 100.2 CM/SEC
BH CV ECHO MEAS - LV V1 VTI: 21.3 CM
BH CV ECHO MEAS - LVIDD: 3.9 CM
BH CV ECHO MEAS - LVIDS: 1.4 CM
BH CV ECHO MEAS - LVOT AREA: 2.5 CM2
BH CV ECHO MEAS - LVOT DIAM: 1.79 CM
BH CV ECHO MEAS - LVPWD: 1.34 CM
BH CV ECHO MEAS - MV MAX PG: 4.7 MMHG
BH CV ECHO MEAS - MV MEAN PG: 1.77 MMHG
BH CV ECHO MEAS - MV V2 VTI: 32.9 CM
BH CV ECHO MEAS - MVA(VTI): 1.64 CM2
BH CV ECHO MEAS - PA ACC TIME: 0.11 SEC
BH CV ECHO MEAS - PA PR(ACCEL): 29.2 MMHG
BH CV ECHO MEAS - PA V2 MAX: 77.5 CM/SEC
BH CV ECHO MEAS - RV MAX PG: 2.11 MMHG
BH CV ECHO MEAS - RV V1 MAX: 72.7 CM/SEC
BH CV ECHO MEAS - RV V1 VTI: 12.1 CM
BH CV ECHO MEAS - RVDD: 3.8 CM
BH CV ECHO MEAS - SV(LVOT): 54 ML
BH CV ECHO MEAS - SV(MOD-SP2): 28.4 ML
BH CV ECHO MEAS - SV(MOD-SP4): 22.6 ML
MAXIMAL PREDICTED HEART RATE: 142 BPM
STRESS TARGET HR: 121 BPM

## 2022-04-25 PROCEDURE — 99214 OFFICE O/P EST MOD 30 MIN: CPT | Performed by: INTERNAL MEDICINE

## 2022-04-25 PROCEDURE — 93306 TTE W/DOPPLER COMPLETE: CPT | Performed by: INTERNAL MEDICINE

## 2022-04-25 PROCEDURE — 93306 TTE W/DOPPLER COMPLETE: CPT

## 2022-04-25 NOTE — PROGRESS NOTES
Encounter Date:04/25/2022  Last seen 4/7/2022      Patient ID: Luis MONGE Elder is a 76 y.o. male.    Chief Complaint:    Aortic valve stenosis  Status post TAVR  Bradycardia  Hypertension  Dyslipidemia  Dizziness     History of Present Illness  Patient recently was having dizziness versus  Patient has 30-day event monitor in progress.  Since I have last seen, the patient has been without any chest discomfort ,shortness of breath, palpitations, dizziness or syncope.  Denies having any headache ,abdominal pain ,nausea, vomiting , diarrhea constipation, loss of weight or loss of appetite.  Denies having any excessive bruising ,hematuria or blood in the stool.    Review of all systems negative except as indicated.    Reviewed ROS.    Assessment and Plan      ]]]]]]]]]]]]]]]]]]  Impression  ==========  Status post TAVR 11/3/2021-  (29 mm Evolut Pro self-expanding transcatheter aortic valve Medtronic)    Echocardiogram 4/25/2022 revealed normal left ventricular function with ejection fraction of 60%..  TAVR valve appears to be intact.  No evidence for aortic regurgitation is present.     History of significant aortic valve stenosis with gradient of 66 (peak to peak) and 31 mean gradient and valve area of 1.2 cm²-dizziness and near syncopal feeling.  Echocardiogram 9/27/2021 revealed  Normal left ventricle function significant aortic valve stenosis with valve area of 0.4-0.5 cm².  Gradient 50/35 mmHg.     Cardiac catheterization 10/1/2021  No evidence for pulmonary hypertension is present.  Left ventricle angiogram was not performed due to inability to cross the aortic valve.  Aortogram revealed normal-sized aorta with significantly reduced aortic valve motion.  No evidence for aortic regurgitation is present.  Left main coronary artery is normal.  Left anterior descending artery normal.  Circumflex coronary artery is normal.  Right coronary artery is normal (large and dominant)             Transesophageal  echocardiogram 10/1/2021 revealed     Significant aortic valve stenosis with valve area of 0.8 cm².  Left ventricle is normal in size and contractility.  Ejection fraction 60%.  Mild mitral and aortic regurgitation is present.  No pericardial effusion or intracardiac thrombus is seen        · -Sinus bradycardia  ·    · -Hypertension and dyslipidemia  ·    · -History of prostate cancer.  ·    · -Status post cholecystectomy  ·    · - Allergic to nitroglycerin  ·    · -Non-smoker  · =============  · Plan  · =========  · Dizziness and near syncope.  · Patient had transient left bundle branch block after TAVR.  · Monitor was negative at that time.  · Patient is having more dizziness and near syncopal feeling lately.  · 30-day event monitor.-Progress  · Echocardiogram-as above  ·   · Status post TAVR  · 11/3/2021  ·    · Hypertension- 199/88  · Continue losartan metoprolol.  · Patient was asked to restart Norvasc 5 mg a day which he stopped because of some lower extremity swelling.  ·    · Dyslipidemia-continue atorvastatin  ·    · Sinus bradycardia-30-day monitor.  ·    · Follow-up in the office in 6 weeks with EKG.  · Patient is going to [place] for vacation.  · Consider pacemaker implantation if patient has significant AV blocks or significant bradycardia  ·    · Further plan will depend on patient's progress.  · [[[[[[[[[[[[[[[                      Diagnosis Plan   1. S/p TAVR (transcatheter aortic valve replacement), bioprosthetic     2. Near syncope     3. Mixed hyperlipidemia     4. LBBB (left bundle branch block)     5. Essential hypertension     LAB RESULTS (LAST 7 DAYS)    CBC        BMP        CMP         BNP        TROPONIN        CoAg        Creatinine Clearance  CrCl cannot be calculated (Patient's most recent lab result is older than the maximum 30 days allowed.).    ABG        Radiology  No radiology results for the last day                The following portions of the patient's history were reviewed and  updated as appropriate: allergies, current medications, past family history, past medical history, past social history, past surgical history and problem list.    Review of Systems   Constitutional: Negative for malaise/fatigue.   Cardiovascular: Negative for chest pain, leg swelling, palpitations and syncope.   Respiratory: Negative for shortness of breath.    Skin: Negative for rash.   Gastrointestinal: Negative for nausea and vomiting.   Neurological: Negative for dizziness, light-headedness and numbness.   All other systems reviewed and are negative.        Current Outpatient Medications:   •  atorvastatin (LIPITOR) 20 MG tablet, Take 1 tablet by mouth Daily., Disp: 90 tablet, Rfl: 2  •  glucosamine-chondroitin 500-400 MG per tablet, Take 1 tablet by mouth Daily., Disp: , Rfl:   •  latanoprost (XALATAN) 0.005 % ophthalmic solution, , Disp: , Rfl:   •  Loratadine 10 MG capsule, Take 1 capsule by mouth Daily., Disp: , Rfl:   •  losartan (COZAAR) 100 MG tablet, Take 1 tablet by mouth Daily., Disp: 90 tablet, Rfl: 2  •  methylcellulose, Laxative, (CITRUCEL) 500 MG tablet tablet, Take 1 tablet by mouth 2 (two) times a day., Disp: , Rfl:   •  metoprolol succinate XL (TOPROL-XL) 50 MG 24 hr tablet, Take 1 tablet by mouth Daily for 90 days., Disp: 90 tablet, Rfl: 2  •  Multiple Vitamin (MULTIVITAMINS PO), Take 1 capsule by mouth Daily., Disp: , Rfl:   •  Omega-3 Fatty Acids (FISH OIL CONCENTRATE) 300 MG capsule, Take 1 capsule by mouth Daily., Disp: , Rfl:   •  omeprazole (priLOSEC) 20 MG capsule, Take 1 capsule by mouth Daily., Disp: 90 capsule, Rfl: 2  •  ondansetron ODT (ZOFRAN-ODT) 4 MG disintegrating tablet, Place 1 tablet on the tongue Every 8 (Eight) Hours As Needed for Nausea or Vomiting., Disp: 30 tablet, Rfl: 2  •  Polyvinyl Alcohol-Povidone (REFRESH OP), Apply 1 drop to eye(s) as directed by provider 2 (Two) Times a Day., Disp: , Rfl:   •  amLODIPine (NORVASC) 5 MG tablet, Take 1 tablet by mouth Daily., Disp:  90 tablet, Rfl: 0    Current Facility-Administered Medications:   •  ondansetron (ZOFRAN) injection 4 mg, 4 mg, Intravenous, Q6H PRN, Selena White MD    Allergies   Allergen Reactions   • Nitroglycerin Unknown (See Comments)     chills       Family History   Problem Relation Age of Onset   • Testicular cancer Father    • Coronary artery disease Father    • Hypertension Father    • Hypertension Other    • Cancer Mother    • Hypertension Sister    • Malig Hyperthermia Neg Hx        Past Surgical History:   Procedure Laterality Date   • ANKLE TENDON REPAIR  July 5th, 2017    Dr. Armijo   • AORTIC VALVE REPAIR/REPLACEMENT N/A 11/2/2021    Procedure: TTE TRANSFEMORAL TRANSCATHETER AORTIC VALVE REPLACEMENT with IntraOp TTE and possible open surgical rescue.;  Surgeon: Preston Hager MD;  Location: [facility] OR 18/19;  Service: Cardiothoracic;  Laterality: N/A;   • AORTIC VALVE REPAIR/REPLACEMENT N/A 11/2/2021    Procedure: Transfemoral Transcatheter Aortic Valve Replacement with IntraOp TTE and possible open surgical rescue.;  Surgeon: Manpreet Vargas MD;  Location: [facility] OR 18/19;  Service: Cardiovascular;  Laterality: N/A;   • CARDIAC CATHETERIZATION  2005    no stents   • CARDIAC CATHETERIZATION N/A 10/1/2021    Procedure: Right and Left Heart Cath with Coronar Angiography;  Surgeon: Luis Angel Frederick MD;  Location: [facility] INVASIVE LOCATION;  Service: Cardiovascular;  Laterality: N/A;   • CARDIAC CATHETERIZATION N/A 10/1/2021    Procedure: Aortic root aortogram;  Surgeon: Luis Angel Frederick MD;  Location: [facility] INVASIVE LOCATION;  Service: Cardiovascular;  Laterality: N/A;   • CARDIAC VALVE REPLACEMENT  11/2/21   • CHOLECYSTECTOMY  03/2010    [facility].   • PROSTATECTOMY  01/1997    [facility].       Past Medical History:   Diagnosis Date   • Abdominal pain     Impression: s/p recent cholecystecomy clinically improved   • Acute recurrent  "frontal sinusitis    • Acute upper respiratory infection    • Anemia     Impression: stable, Hgb 12.1   • Cancer (HCC)     prostate- removed surgically   • Cholelithiasis 3/29/2010    Removed   • Chronic angle-closure glaucoma    • DNR (do not resuscitate)    • Essential hypertension    • Gastroesophageal reflux disease without esophagitis     Impression: Stable on current meds (alternating omeprazole and ranitidine). EGD 1/2017.   • Heart murmur    • History of measles    • Hyperlipidemia    • Medicare annual wellness visit, subsequent    • Onychomycosis     Impression: Treatment options discussed. Prescription(s) as below. Medication(s) usage and side effects discussed.   • Overweight    • Personal history of malignant neoplasm of prostate    • Screening for depression     Negative Depression Screening (4 or less) ()   • Screening PSA (prostate specific antigen)     Impression: With history of prostate cancer. New urinary symptoms. Recheck PSA.   • Urinary frequency     Impression: Prescription(s) as below. Medication(s) usage and side effects discussed.       Family History   Problem Relation Age of Onset   • Testicular cancer Father    • Coronary artery disease Father    • Hypertension Father    • Hypertension Other    • Cancer Mother    • Hypertension Sister    • Malig Hyperthermia Neg Hx        Social History     Socioeconomic History   • Marital status:    • Number of children: 4   Tobacco Use   • Smoking status: Never Smoker   • Smokeless tobacco: Never Used   • Tobacco comment: caffeine use   Vaping Use   • Vaping Use: Never used   Substance and Sexual Activity   • Alcohol use: No   • Drug use: Never   • Sexual activity: Defer         Procedures      Objective:       Physical Exam    BP (!) 199/88 (BP Location: Right arm, Patient Position: Sitting, Cuff Size: Adult)   Pulse 57   Ht 170.2 cm (67\")   Wt 81.6 kg (180 lb)   SpO2 99%   BMI 28.19 kg/m²   The patient is alert, oriented and in no " distress.    Vital signs as noted above.    Head and neck revealed no carotid bruits or jugular venous distension.  No thyromegaly or lymphadenopathy is present.    Lungs clear.  No wheezing.  Breath sounds are normal bilaterally.    Heart normal first and second heart sounds.  No murmur..  No pericardial rub is present.  No gallop is present.    Abdomen soft and nontender.  No organomegaly is present.    Extremities revealed good peripheral pulses without any pedal edema.    Skin warm and dry.    Musculoskeletal system is grossly normal.    CNS grossly normal.    Reviewed and updated.

## 2022-05-15 DIAGNOSIS — I10 ESSENTIAL HYPERTENSION: ICD-10-CM

## 2022-05-15 RX ORDER — AMLODIPINE BESYLATE 5 MG/1
5 TABLET ORAL DAILY
Qty: 90 TABLET | Refills: 0 | Status: SHIPPED | OUTPATIENT
Start: 2022-05-15 | End: 2022-05-23 | Stop reason: SDUPTHER

## 2022-05-23 ENCOUNTER — OFFICE VISIT (OUTPATIENT)
Dept: FAMILY MEDICINE CLINIC | Facility: CLINIC | Age: 77
End: 2022-05-23

## 2022-05-23 VITALS
BODY MASS INDEX: 28.02 KG/M2 | WEIGHT: 178.5 LBS | OXYGEN SATURATION: 99 % | TEMPERATURE: 97.3 F | RESPIRATION RATE: 18 BRPM | HEIGHT: 67 IN | DIASTOLIC BLOOD PRESSURE: 78 MMHG | SYSTOLIC BLOOD PRESSURE: 134 MMHG | HEART RATE: 68 BPM

## 2022-05-23 DIAGNOSIS — M79.89 LEG SWELLING: ICD-10-CM

## 2022-05-23 DIAGNOSIS — I10 ESSENTIAL HYPERTENSION: Primary | ICD-10-CM

## 2022-05-23 DIAGNOSIS — E66.3 OVERWEIGHT (BMI 25.0-29.9): ICD-10-CM

## 2022-05-23 PROCEDURE — 99213 OFFICE O/P EST LOW 20 MIN: CPT | Performed by: FAMILY MEDICINE

## 2022-05-23 RX ORDER — AMLODIPINE BESYLATE 5 MG/1
5 TABLET ORAL DAILY
Qty: 90 TABLET | Refills: 1 | Status: SHIPPED | OUTPATIENT
Start: 2022-05-23 | End: 2022-08-15

## 2022-05-24 LAB
ALBUMIN SERPL-MCNC: 4.6 G/DL (ref 3.7–4.7)
ALBUMIN/GLOB SERPL: 2 {RATIO} (ref 1.2–2.2)
ALP SERPL-CCNC: 76 IU/L (ref 44–121)
ALT SERPL-CCNC: 16 IU/L (ref 0–44)
AST SERPL-CCNC: 21 IU/L (ref 0–40)
BASOPHILS # BLD AUTO: 0.1 X10E3/UL (ref 0–0.2)
BASOPHILS NFR BLD AUTO: 1 %
BILIRUB SERPL-MCNC: 1.2 MG/DL (ref 0–1.2)
BUN SERPL-MCNC: 12 MG/DL (ref 8–27)
BUN/CREAT SERPL: 10 (ref 10–24)
CALCIUM SERPL-MCNC: 9.8 MG/DL (ref 8.6–10.2)
CHLORIDE SERPL-SCNC: 103 MMOL/L (ref 96–106)
CHOLEST SERPL-MCNC: 136 MG/DL (ref 100–199)
CO2 SERPL-SCNC: 22 MMOL/L (ref 20–29)
CREAT SERPL-MCNC: 1.23 MG/DL (ref 0.76–1.27)
EGFRCR SERPLBLD CKD-EPI 2021: 60 ML/MIN/1.73
EOSINOPHIL # BLD AUTO: 0.2 X10E3/UL (ref 0–0.4)
EOSINOPHIL NFR BLD AUTO: 3 %
ERYTHROCYTE [DISTWIDTH] IN BLOOD BY AUTOMATED COUNT: 12.4 % (ref 11.6–15.4)
GLOBULIN SER CALC-MCNC: 2.3 G/DL (ref 1.5–4.5)
GLUCOSE SERPL-MCNC: 96 MG/DL (ref 65–99)
HCT VFR BLD AUTO: 43.8 % (ref 37.5–51)
HDLC SERPL-MCNC: 49 MG/DL
HGB BLD-MCNC: 14.8 G/DL (ref 13–17.7)
IMM GRANULOCYTES # BLD AUTO: 0 X10E3/UL (ref 0–0.1)
IMM GRANULOCYTES NFR BLD AUTO: 0 %
LDLC SERPL CALC-MCNC: 69 MG/DL (ref 0–99)
LYMPHOCYTES # BLD AUTO: 1.8 X10E3/UL (ref 0.7–3.1)
LYMPHOCYTES NFR BLD AUTO: 24 %
MCH RBC QN AUTO: 31.4 PG (ref 26.6–33)
MCHC RBC AUTO-ENTMCNC: 33.8 G/DL (ref 31.5–35.7)
MCV RBC AUTO: 93 FL (ref 79–97)
MONOCYTES # BLD AUTO: 0.8 X10E3/UL (ref 0.1–0.9)
MONOCYTES NFR BLD AUTO: 11 %
NEUTROPHILS # BLD AUTO: 4.6 X10E3/UL (ref 1.4–7)
NEUTROPHILS NFR BLD AUTO: 61 %
PLATELET # BLD AUTO: 202 X10E3/UL (ref 150–450)
POTASSIUM SERPL-SCNC: 4.7 MMOL/L (ref 3.5–5.2)
PROT SERPL-MCNC: 6.9 G/DL (ref 6–8.5)
RBC # BLD AUTO: 4.72 X10E6/UL (ref 4.14–5.8)
SODIUM SERPL-SCNC: 140 MMOL/L (ref 134–144)
TRIGL SERPL-MCNC: 94 MG/DL (ref 0–149)
VLDLC SERPL CALC-MCNC: 18 MG/DL (ref 5–40)
WBC # BLD AUTO: 7.5 X10E3/UL (ref 3.4–10.8)

## 2022-06-09 ENCOUNTER — OFFICE VISIT (OUTPATIENT)
Dept: CARDIOLOGY | Facility: CLINIC | Age: 77
End: 2022-06-09

## 2022-06-09 VITALS
HEART RATE: 64 BPM | SYSTOLIC BLOOD PRESSURE: 143 MMHG | DIASTOLIC BLOOD PRESSURE: 81 MMHG | OXYGEN SATURATION: 100 % | BODY MASS INDEX: 28.25 KG/M2 | HEIGHT: 67 IN | WEIGHT: 180 LBS

## 2022-06-09 DIAGNOSIS — I44.7 LBBB (LEFT BUNDLE BRANCH BLOCK): ICD-10-CM

## 2022-06-09 DIAGNOSIS — Z95.2 S/P TAVR (TRANSCATHETER AORTIC VALVE REPLACEMENT): ICD-10-CM

## 2022-06-09 DIAGNOSIS — Z95.3 S/P TAVR (TRANSCATHETER AORTIC VALVE REPLACEMENT), BIOPROSTHETIC: Primary | ICD-10-CM

## 2022-06-09 DIAGNOSIS — I10 ESSENTIAL HYPERTENSION: ICD-10-CM

## 2022-06-09 DIAGNOSIS — E78.2 MIXED HYPERLIPIDEMIA: ICD-10-CM

## 2022-06-09 DIAGNOSIS — R55 NEAR SYNCOPE: ICD-10-CM

## 2022-06-09 PROCEDURE — 93000 ELECTROCARDIOGRAM COMPLETE: CPT | Performed by: INTERNAL MEDICINE

## 2022-06-09 PROCEDURE — 99214 OFFICE O/P EST MOD 30 MIN: CPT | Performed by: INTERNAL MEDICINE

## 2022-06-09 NOTE — PROGRESS NOTES
Encounter Date:06/09/2022  Last seen 4/25/2022      Patient ID: Luis MONGE Elder is a 76 y.o. male.    Chief Complaint:  Aortic valve stenosis  Status post TAVR  Bradycardia  Hypertension  Dyslipidemia  Dizziness     History of Present Illness  Occasional double vision.  Dizziness is better.  Since I have last seen, the patient has been without any chest discomfort ,shortness of breath, palpitations, or syncope.  Denies having any headache ,abdominal pain ,nausea, vomiting , diarrhea constipation, loss of weight or loss of appetite.  Denies having any excessive bruising ,hematuria or blood in the stool.    Review of all systems negative except as indicated.    Reviewed ROS.    Assessment and Plan      ]]]]]]]]]]]]]]]]]]  Impression  ==========  Status post TAVR 11/3/2021-  (29 mm Evolut Pro self-expanding transcatheter aortic valve Medtronic)     Echocardiogram 4/25/2022 revealed normal left ventricular function with ejection fraction of 60%..  TAVR valve appears to be intact.  No evidence for aortic regurgitation is present.    30-day event monitor 4/7/2022  Basic rhythm is sinus.  Rate varied between /mt  No ectopy was noted.  No AV blocks or pauses were noted.    History of significant aortic valve stenosis with gradient of 66 (peak to peak) and 31 mean gradient and valve area of 1.2 cm²-dizziness and near syncopal feeling.  Echocardiogram 9/27/2021 revealed  Normal left ventricle function significant aortic valve stenosis with valve area of 0.4-0.5 cm².  Gradient 50/35 mmHg.     Cardiac catheterization 10/1/2021  No evidence for pulmonary hypertension is present.  Left ventricle angiogram was not performed due to inability to cross the aortic valve.  Aortogram revealed normal-sized aorta with significantly reduced aortic valve motion.  No evidence for aortic regurgitation is present.  Left main coronary artery is normal.  Left anterior descending artery normal.  Circumflex coronary artery is  normal.  Right coronary artery is normal (large and dominant)             Transesophageal echocardiogram 10/1/2021 revealed     Significant aortic valve stenosis with valve area of 0.8 cm².  Left ventricle is normal in size and contractility.  Ejection fraction 60%.  Mild mitral and aortic regurgitation is present.  No pericardial effusion or intracardiac thrombus is seen        · -Sinus bradycardia  ·    · -Hypertension and dyslipidemia  ·    · -History of prostate cancer.  ·    · -Status post cholecystectomy  ·    · - Allergic to nitroglycerin  ·    · -Non-smoker  · =============  · Plan  · =========  Dizziness- better  Occasional double vision-primary care.    Sinus bradycardia  Normal sinus rhythm normal ECG 65/min- 6/9/2022    30-day event monitor 4/7/2022  Basic rhythm is sinus.  Rate varied between /mt  No ectopy was noted.  No AV blocks or pauses were noted.    · Status post TAVR  · 11/3/2021  ·    · Hypertension- .  · 143/81 continue losartan metoprolol.  · Continue Norvasc 5 mg a day  ·    · Dyslipidemia-continue atorvastatin.  ·    · Follow-up in the office in  6 months.     · Further plan will depend on patient's progress.  [[[[[[[[[[[[[[[                       Diagnosis Plan   1. S/p TAVR (transcatheter aortic valve replacement), bioprosthetic     2. Near syncope     3. Mixed hyperlipidemia     4. S/P TAVR (transcatheter aortic valve replacement)     5. Essential hypertension     6. LBBB (left bundle branch block)     LAB RESULTS (LAST 7 DAYS)    CBC        BMP        CMP         BNP        TROPONIN        CoAg        Creatinine Clearance  Estimated Creatinine Clearance: 50.6 mL/min (by C-G formula based on SCr of 1.23 mg/dL).    ABG        Radiology  No radiology results for the last day                The following portions of the patient's history were reviewed and updated as appropriate: allergies, current medications, past family history, past medical history, past social history, past  surgical history and problem list.    Review of Systems   Constitutional: Negative for malaise/fatigue.   Eyes: Positive for double vision.   Cardiovascular: Negative for chest pain, leg swelling, palpitations and syncope.   Respiratory: Negative for shortness of breath.    Skin: Negative for rash.   Gastrointestinal: Negative for nausea and vomiting.   Neurological: Positive for dizziness. Negative for light-headedness and numbness.   All other systems reviewed and are negative.        Current Outpatient Medications:   •  amLODIPine (NORVASC) 5 MG tablet, Take 1 tablet by mouth Daily., Disp: 90 tablet, Rfl: 1  •  atorvastatin (LIPITOR) 20 MG tablet, Take 1 tablet by mouth Daily., Disp: 90 tablet, Rfl: 2  •  glucosamine-chondroitin 500-400 MG per tablet, Take 1 tablet by mouth Daily., Disp: , Rfl:   •  latanoprost (XALATAN) 0.005 % ophthalmic solution, , Disp: , Rfl:   •  Loratadine 10 MG capsule, Take 1 capsule by mouth Daily., Disp: , Rfl:   •  losartan (COZAAR) 100 MG tablet, Take 1 tablet by mouth Daily., Disp: 90 tablet, Rfl: 2  •  methylcellulose, Laxative, (CITRUCEL) 500 MG tablet tablet, Take 1 tablet by mouth 2 (two) times a day., Disp: , Rfl:   •  metoprolol succinate XL (TOPROL-XL) 50 MG 24 hr tablet, Take 1 tablet by mouth Daily for 90 days., Disp: 90 tablet, Rfl: 2  •  Multiple Vitamin (MULTIVITAMINS PO), Take 1 capsule by mouth Daily., Disp: , Rfl:   •  Omega-3 Fatty Acids (FISH OIL CONCENTRATE) 300 MG capsule, Take 1 capsule by mouth Daily., Disp: , Rfl:   •  omeprazole (priLOSEC) 20 MG capsule, Take 1 capsule by mouth Daily., Disp: 90 capsule, Rfl: 2  •  ondansetron ODT (ZOFRAN-ODT) 4 MG disintegrating tablet, Place 1 tablet on the tongue Every 8 (Eight) Hours As Needed for Nausea or Vomiting., Disp: 30 tablet, Rfl: 2  •  Polyvinyl Alcohol-Povidone (REFRESH OP), Apply 1 drop to eye(s) as directed by provider 2 (Two) Times a Day., Disp: , Rfl:     Current Facility-Administered Medications:   •   ondansetron (ZOFRAN) injection 4 mg, 4 mg, Intravenous, Q6H PRN, Selena White MD    Allergies   Allergen Reactions   • Nitroglycerin Unknown (See Comments)     chills       Family History   Problem Relation Age of Onset   • Testicular cancer Father    • Coronary artery disease Father    • Hypertension Father    • Hypertension Other    • Cancer Mother    • Hypertension Sister    • Malig Hyperthermia Neg Hx        Past Surgical History:   Procedure Laterality Date   • ANKLE TENDON REPAIR  July 5th, 2017    Dr. Armijo   • AORTIC VALVE REPAIR/REPLACEMENT N/A 11/2/2021    Procedure: TTE TRANSFEMORAL TRANSCATHETER AORTIC VALVE REPLACEMENT with IntraOp TTE and possible open surgical rescue.;  Surgeon: Preston Hager MD;  Location: [facility] OR 18/19;  Service: Cardiothoracic;  Laterality: N/A;   • AORTIC VALVE REPAIR/REPLACEMENT N/A 11/2/2021    Procedure: Transfemoral Transcatheter Aortic Valve Replacement with IntraOp TTE and possible open surgical rescue.;  Surgeon: Manpreet Vargas MD;  Location: [facility] OR 18/19;  Service: Cardiovascular;  Laterality: N/A;   • CARDIAC CATHETERIZATION  2005    no stents   • CARDIAC CATHETERIZATION N/A 10/1/2021    Procedure: Right and Left Heart Cath with Coronar Angiography;  Surgeon: Luis Angel Frederick MD;  Location: [facility] INVASIVE LOCATION;  Service: Cardiovascular;  Laterality: N/A;   • CARDIAC CATHETERIZATION N/A 10/1/2021    Procedure: Aortic root aortogram;  Surgeon: Luis Angel Frederick MD;  Location: [facility] CATH INVASIVE LOCATION;  Service: Cardiovascular;  Laterality: N/A;   • CARDIAC VALVE REPLACEMENT  11/2/21   • CHOLECYSTECTOMY  03/2010    [facility].   • PROSTATECTOMY  01/1997    [facility].       Past Medical History:   Diagnosis Date   • Abdominal pain     Impression: s/p recent cholecystecomy clinically improved   • Acute recurrent frontal sinusitis    • Acute upper respiratory infection    • Anemia      Impression: stable, Hgb 12.1   • Cancer (HCC)     prostate- removed surgically   • Cholelithiasis 3/29/2010    Removed   • Chronic angle-closure glaucoma    • DNR (do not resuscitate)    • Essential hypertension    • Gastroesophageal reflux disease without esophagitis     Impression: Stable on current meds (alternating omeprazole and ranitidine). EGD 1/2017.   • Heart murmur    • History of measles    • Hyperlipidemia    • Medicare annual wellness visit, subsequent    • Onychomycosis     Impression: Treatment options discussed. Prescription(s) as below. Medication(s) usage and side effects discussed.   • Overweight    • Personal history of malignant neoplasm of prostate    • Screening for depression     Negative Depression Screening (4 or less) ()   • Screening PSA (prostate specific antigen)     Impression: With history of prostate cancer. New urinary symptoms. Recheck PSA.   • Urinary frequency     Impression: Prescription(s) as below. Medication(s) usage and side effects discussed.       Family History   Problem Relation Age of Onset   • Testicular cancer Father    • Coronary artery disease Father    • Hypertension Father    • Hypertension Other    • Cancer Mother    • Hypertension Sister    • Malig Hyperthermia Neg Hx        Social History     Socioeconomic History   • Marital status: Unknown   • Number of children: 4   Tobacco Use   • Smoking status: Never Smoker   • Smokeless tobacco: Never Used   • Tobacco comment: caffeine use   Vaping Use   • Vaping Use: Never used   Substance and Sexual Activity   • Alcohol use: No   • Drug use: Never   • Sexual activity: Defer           ECG 12 Lead    Date/Time: 6/9/2022 10:31 AM  Performed by: Luis Angel Frederick MD  Authorized by: Luis Angel Frederick MD   Comparison: compared with previous ECG   Similar to previous ECG  Comparison to previous ECG: Normal sinus rhythm normal ECG normal axis normal intervals 65/min no ectopy no significant change from  "12/16/2021                Objective:       Physical Exam    /81 (BP Location: Left arm, Patient Position: Sitting, Cuff Size: Adult)   Pulse 64   Ht 170.2 cm (67\")   Wt 81.6 kg (180 lb)   SpO2 100%   BMI 28.19 kg/m²   The patient is alert, oriented and in no distress.    Vital signs as noted above.    Head and neck revealed no carotid bruits or jugular venous distension.  No thyromegaly or lymphadenopathy is present.    Lungs clear.  No wheezing.  Breath sounds are normal bilaterally.    Heart normal first and second heart sounds.  No murmur..  No pericardial rub is present.  No gallop is present.    Abdomen soft and nontender.  No organomegaly is present.    Extremities revealed good peripheral pulses without any pedal edema.    Skin warm and dry.    Musculoskeletal system is grossly normal.    CNS grossly normal.    Reviewed and updated.        "

## 2022-06-13 PROBLEM — M79.89 LEG SWELLING: Status: ACTIVE | Noted: 2022-06-13

## 2022-06-13 PROBLEM — E66.3 OVERWEIGHT (BMI 25.0-29.9): Status: ACTIVE | Noted: 2022-06-13

## 2022-06-13 PROBLEM — N18.2 CKD (CHRONIC KIDNEY DISEASE) STAGE 2, GFR 60-89 ML/MIN: Status: ACTIVE | Noted: 2022-06-13

## 2022-07-07 DIAGNOSIS — I10 ESSENTIAL HYPERTENSION: ICD-10-CM

## 2022-07-07 RX ORDER — AMLODIPINE BESYLATE 10 MG/1
10 TABLET ORAL DAILY
Qty: 90 TABLET | Refills: 1 | OUTPATIENT
Start: 2022-07-07

## 2022-08-14 DIAGNOSIS — I10 ESSENTIAL HYPERTENSION: ICD-10-CM

## 2022-08-15 RX ORDER — AMLODIPINE BESYLATE 5 MG/1
5 TABLET ORAL DAILY
Qty: 90 TABLET | Refills: 0 | Status: SHIPPED | OUTPATIENT
Start: 2022-08-15 | End: 2023-01-09 | Stop reason: SDUPTHER

## 2022-09-08 ENCOUNTER — OFFICE VISIT (OUTPATIENT)
Dept: CARDIOLOGY | Facility: CLINIC | Age: 77
End: 2022-09-08

## 2022-09-08 ENCOUNTER — HOSPITAL ENCOUNTER (OUTPATIENT)
Dept: CARDIOLOGY | Facility: HOSPITAL | Age: 77
Discharge: HOME | End: 2022-09-08

## 2022-09-08 ENCOUNTER — LAB (OUTPATIENT)
Dept: LAB | Facility: HOSPITAL | Age: 77
End: 2022-09-08

## 2022-09-08 VITALS
WEIGHT: 179.9 LBS | HEIGHT: 67 IN | HEART RATE: 61 BPM | SYSTOLIC BLOOD PRESSURE: 120 MMHG | BODY MASS INDEX: 28.24 KG/M2 | DIASTOLIC BLOOD PRESSURE: 65 MMHG

## 2022-09-08 VITALS — WEIGHT: 180 LBS | BODY MASS INDEX: 28.25 KG/M2 | HEIGHT: 67 IN

## 2022-09-08 DIAGNOSIS — Z95.2 S/P TAVR (TRANSCATHETER AORTIC VALVE REPLACEMENT): ICD-10-CM

## 2022-09-08 DIAGNOSIS — I50.32 CHRONIC DIASTOLIC CONGESTIVE HEART FAILURE: ICD-10-CM

## 2022-09-08 DIAGNOSIS — I35.0 AORTIC STENOSIS, SEVERE: ICD-10-CM

## 2022-09-08 DIAGNOSIS — E78.2 MIXED HYPERLIPIDEMIA: ICD-10-CM

## 2022-09-08 DIAGNOSIS — I35.0 AORTIC STENOSIS, SEVERE: Primary | ICD-10-CM

## 2022-09-08 LAB
ALBUMIN SERPL-MCNC: 4 G/DL (ref 3.5–5.2)
ALBUMIN/GLOB SERPL: 1.5 G/DL
ALP SERPL-CCNC: 68 U/L (ref 39–117)
ALT SERPL W P-5'-P-CCNC: 12 U/L (ref 1–41)
ANION GAP SERPL CALCULATED.3IONS-SCNC: 5.6 MMOL/L (ref 5–15)
AORTIC ARCH: 1.9 CM
AORTIC DIMENSIONLESS INDEX: 0.5 (DI)
ASCENDING AORTA: 2.6 CM
AST SERPL-CCNC: 19 U/L (ref 1–40)
BH CV ECHO MEAS - ACS: 1.22 CM
BH CV ECHO MEAS - AO MAX PG: 22.6 MMHG
BH CV ECHO MEAS - AO MEAN PG: 10.7 MMHG
BH CV ECHO MEAS - AO ROOT DIAM: 1.95 CM
BH CV ECHO MEAS - AO V2 MAX: 237.7 CM/SEC
BH CV ECHO MEAS - AO V2 VTI: 54.1 CM
BH CV ECHO MEAS - AVA(I,D): 1.29 CM2
BH CV ECHO MEAS - EDV(CUBED): 74.2 ML
BH CV ECHO MEAS - EDV(MOD-SP2): 108 ML
BH CV ECHO MEAS - EDV(MOD-SP4): 82 ML
BH CV ECHO MEAS - EF(MOD-BP): 60.5 %
BH CV ECHO MEAS - EF(MOD-SP2): 63.9 %
BH CV ECHO MEAS - EF(MOD-SP4): 57.3 %
BH CV ECHO MEAS - ESV(CUBED): 7.7 ML
BH CV ECHO MEAS - ESV(MOD-SP2): 39 ML
BH CV ECHO MEAS - ESV(MOD-SP4): 35 ML
BH CV ECHO MEAS - FS: 52.9 %
BH CV ECHO MEAS - IVS/LVPW: 1.01 CM
BH CV ECHO MEAS - IVSD: 1.23 CM
BH CV ECHO MEAS - LAT PEAK E' VEL: 8 CM/SEC
BH CV ECHO MEAS - LV MASS(C)D: 183.3 GRAMS
BH CV ECHO MEAS - LV MAX PG: 6 MMHG
BH CV ECHO MEAS - LV MEAN PG: 3.3 MMHG
BH CV ECHO MEAS - LV V1 MAX: 123 CM/SEC
BH CV ECHO MEAS - LV V1 VTI: 29 CM
BH CV ECHO MEAS - LVIDD: 4.2 CM
BH CV ECHO MEAS - LVIDS: 1.98 CM
BH CV ECHO MEAS - LVOT AREA: 2.41 CM2
BH CV ECHO MEAS - LVOT DIAM: 1.75 CM
BH CV ECHO MEAS - LVPWD: 1.22 CM
BH CV ECHO MEAS - MED PEAK E' VEL: 7.3 CM/SEC
BH CV ECHO MEAS - MV A DUR: 0.15 SEC
BH CV ECHO MEAS - MV A MAX VEL: 70.7 CM/SEC
BH CV ECHO MEAS - MV DEC SLOPE: 232.1 CM/SEC2
BH CV ECHO MEAS - MV DEC TIME: 0.3 MSEC
BH CV ECHO MEAS - MV E MAX VEL: 70.3 CM/SEC
BH CV ECHO MEAS - MV E/A: 0.99
BH CV ECHO MEAS - MV MAX PG: 4.9 MMHG
BH CV ECHO MEAS - MV MEAN PG: 2.33 MMHG
BH CV ECHO MEAS - MV P1/2T: 99.4 MSEC
BH CV ECHO MEAS - MV V2 VTI: 32.2 CM
BH CV ECHO MEAS - MVA(P1/2T): 2.21 CM2
BH CV ECHO MEAS - MVA(VTI): 2.17 CM2
BH CV ECHO MEAS - PA ACC TIME: 0.08 SEC
BH CV ECHO MEAS - PA PR(ACCEL): 44.1 MMHG
BH CV ECHO MEAS - PA V2 MAX: 96.5 CM/SEC
BH CV ECHO MEAS - PULM A REVS DUR: 0.14 SEC
BH CV ECHO MEAS - PULM A REVS VEL: 33.4 CM/SEC
BH CV ECHO MEAS - PULM DIAS VEL: 39 CM/SEC
BH CV ECHO MEAS - PULM S/D: 1.04
BH CV ECHO MEAS - PULM SYS VEL: 40.7 CM/SEC
BH CV ECHO MEAS - QP/QS: 0.6
BH CV ECHO MEAS - RAP SYSTOLE: 3 MMHG
BH CV ECHO MEAS - RV MAX PG: 1.09 MMHG
BH CV ECHO MEAS - RV V1 MAX: 52.3 CM/SEC
BH CV ECHO MEAS - RV V1 VTI: 12.1 CM
BH CV ECHO MEAS - RVOT DIAM: 2.11 CM
BH CV ECHO MEAS - RVSP: 29 MMHG
BH CV ECHO MEAS - SUP REN AO DIAM: 2 CM
BH CV ECHO MEAS - SV(LVOT): 70 ML
BH CV ECHO MEAS - SV(MOD-SP2): 69 ML
BH CV ECHO MEAS - SV(MOD-SP4): 47 ML
BH CV ECHO MEAS - SV(RVOT): 42.3 ML
BH CV ECHO MEAS - TAPSE (>1.6): 2.49 CM
BH CV ECHO MEAS - TR MAX PG: 26 MMHG
BH CV ECHO MEAS - TR MAX VEL: 255.2 CM/SEC
BH CV ECHO MEASUREMENTS AVERAGE E/E' RATIO: 9.19
BH CV XLRA - RV BASE: 3.7 CM
BH CV XLRA - RV LENGTH: 6.7 CM
BH CV XLRA - RV MID: 3.5 CM
BH CV XLRA - TDI S': 12.7 CM/SEC
BILIRUB SERPL-MCNC: 1.1 MG/DL (ref 0–1.2)
BUN SERPL-MCNC: 14 MG/DL (ref 8–23)
BUN/CREAT SERPL: 12.1 (ref 7–25)
CALCIUM SPEC-SCNC: 9.6 MG/DL (ref 8.6–10.5)
CHLORIDE SERPL-SCNC: 102 MMOL/L (ref 98–107)
CO2 SERPL-SCNC: 29.4 MMOL/L (ref 22–29)
CREAT SERPL-MCNC: 1.16 MG/DL (ref 0.76–1.27)
DEPRECATED RDW RBC AUTO: 43.8 FL (ref 37–54)
EGFRCR SERPLBLD CKD-EPI 2021: 64.1 ML/MIN/1.73
ERYTHROCYTE [DISTWIDTH] IN BLOOD BY AUTOMATED COUNT: 12.7 % (ref 12.3–15.4)
GLOBULIN UR ELPH-MCNC: 2.6 GM/DL
GLUCOSE SERPL-MCNC: 102 MG/DL (ref 65–99)
HCT VFR BLD AUTO: 40.4 % (ref 37.5–51)
HGB BLD-MCNC: 13.5 G/DL (ref 13–17.7)
LEFT ATRIUM VOLUME INDEX: 23.1 ML/M2
LV EF 2D ECHO EST: 61 %
MAXIMAL PREDICTED HEART RATE: 141 BPM
MCH RBC QN AUTO: 31.5 PG (ref 26.6–33)
MCHC RBC AUTO-ENTMCNC: 33.4 G/DL (ref 31.5–35.7)
MCV RBC AUTO: 94.4 FL (ref 79–97)
PLATELET # BLD AUTO: 177 10*3/MM3 (ref 140–450)
PMV BLD AUTO: 9.8 FL (ref 6–12)
POTASSIUM SERPL-SCNC: 4.1 MMOL/L (ref 3.5–5.2)
PROT SERPL-MCNC: 6.6 G/DL (ref 6–8.5)
RBC # BLD AUTO: 4.28 10*6/MM3 (ref 4.14–5.8)
SINUS: 3.6 CM
SODIUM SERPL-SCNC: 137 MMOL/L (ref 136–145)
STJ: 2.8 CM
STRESS TARGET HR: 120 BPM
WBC NRBC COR # BLD: 6.62 10*3/MM3 (ref 3.4–10.8)

## 2022-09-08 PROCEDURE — 36415 COLL VENOUS BLD VENIPUNCTURE: CPT

## 2022-09-08 PROCEDURE — 93000 ELECTROCARDIOGRAM COMPLETE: CPT | Performed by: INTERNAL MEDICINE

## 2022-09-08 PROCEDURE — 85027 COMPLETE CBC AUTOMATED: CPT

## 2022-09-08 PROCEDURE — 93306 TTE W/DOPPLER COMPLETE: CPT

## 2022-09-08 PROCEDURE — 80053 COMPREHEN METABOLIC PANEL: CPT

## 2022-09-08 PROCEDURE — 99214 OFFICE O/P EST MOD 30 MIN: CPT | Performed by: INTERNAL MEDICINE

## 2022-09-08 PROCEDURE — 93306 TTE W/DOPPLER COMPLETE: CPT | Performed by: INTERNAL MEDICINE

## 2022-09-08 NOTE — PROGRESS NOTES
Luis Emery  1945  Date of Office Visit: 09/08/22  Encounter Provider: Manpreet Vargas MD  Place of Service: [facility] CARDIOLOGY      CHIEF COMPLAINT:  Severe degenerative aortic valve stenosis.  Nonrheumatic.  Status post transcatheter aortic valve replacement  Chronic diastolic heart failure  Essential hypertension    HISTORY OF PRESENT ILLNESS:  77-year-old male patient of Dr. Yeimi Frederick.  He is a very pleasant 76-year-old male with medical history of essential hypertension, gastroesophageal reflux disease and progressive dyspnea on exertion who presents to me secondary to severe aortic valve stenosis.  He underwent evaluation for transcatheter aortic valve replacement and subsequently underwent valve replacement with a 29 mm Evolut Pro self-expanding transcatheter aortic valve.  He did very well with that.  He had a left bundle branch block and subsequently underwent a Zio patch after with no evidence of high-grade AV block.  He states that his breathing is great and his lightheadedness is less frequent.  He did subsequently wear a Zio patch secondary to left bundle branch block after his transcatheter aortic valve replacement.  There was no significant AV block documented there.  His left bundle branch block resolved.  He states he has been doing well.  He denies any chest pain or dyspnea on exertion.  He still has occasional lightheadedness that is present both at rest and with activity.  He denies any chest pain.  He underwent a transthoracic echocardiogram today.    Review of Systems   Constitutional: Negative for fever and malaise/fatigue.   HENT: Negative for nosebleeds and sore throat.    Eyes: Negative for blurred vision and double vision.   Cardiovascular: Negative for chest pain, claudication, palpitations and syncope.   Respiratory: Negative for cough, shortness of breath and snoring.    Endocrine: Negative for cold intolerance, heat intolerance and  polydipsia.   Skin: Negative for itching, poor wound healing and rash.   Musculoskeletal: Negative for joint pain, joint swelling, muscle weakness and myalgias.   Gastrointestinal: Negative for abdominal pain, melena, nausea and vomiting.   Neurological: Negative for light-headedness, loss of balance, seizures, vertigo and weakness.   Psychiatric/Behavioral: Negative for altered mental status and depression.       Past Medical History:   Diagnosis Date   • Abdominal pain     Impression: s/p recent cholecystecomy clinically improved   • Acute recurrent frontal sinusitis    • Acute upper respiratory infection    • Anemia     Impression: stable, Hgb 12.1   • Aortic valve replaced 11/2/2021   • Cancer (HCC)     prostate- removed surgically   • Cholelithiasis 03/29/2010    Removed   • Chronic angle-closure glaucoma    • DNR (do not resuscitate)    • Essential hypertension    • Gastroesophageal reflux disease without esophagitis     Impression: Stable on current meds (alternating omeprazole and ranitidine). EGD 1/2017.   • Heart murmur    • History of measles    • Hyperlipidemia    • Medicare annual wellness visit, subsequent    • Onychomycosis     Impression: Treatment options discussed. Prescription(s) as below. Medication(s) usage and side effects discussed.   • Overweight    • Personal history of malignant neoplasm of prostate    • Screening for depression     Negative Depression Screening (4 or less) ()   • Screening PSA (prostate specific antigen)     Impression: With history of prostate cancer. New urinary symptoms. Recheck PSA.   • Urinary frequency     Impression: Prescription(s) as below. Medication(s) usage and side effects discussed.       The following portions of the patient's history were reviewed and updated as appropriate: Social history , Family history and Surgical history     Current Outpatient Medications on File Prior to Visit   Medication Sig Dispense Refill   • amLODIPine (NORVASC) 5 MG tablet  "TAKE 1 TABLET BY MOUTH DAILY 90 tablet 0   • atorvastatin (LIPITOR) 20 MG tablet Take 1 tablet by mouth Daily. 90 tablet 2   • glucosamine-chondroitin 500-400 MG per tablet Take 1 tablet by mouth Daily.     • latanoprost (XALATAN) 0.005 % ophthalmic solution      • Loratadine 10 MG capsule Take 1 capsule by mouth Daily.     • losartan (COZAAR) 100 MG tablet Take 1 tablet by mouth Daily. 90 tablet 2   • methylcellulose, Laxative, (CITRUCEL) 500 MG tablet tablet Take 1 tablet by mouth 2 (two) times a day.     • metoprolol succinate XL (TOPROL-XL) 50 MG 24 hr tablet Take 1 tablet by mouth Daily for 90 days. 90 tablet 2   • Multiple Vitamin (MULTIVITAMINS PO) Take 1 capsule by mouth Daily.     • Omega-3 Fatty Acids (FISH OIL CONCENTRATE) 300 MG capsule Take 1 capsule by mouth Daily.     • omeprazole (priLOSEC) 20 MG capsule Take 1 capsule by mouth Daily. 90 capsule 2   • ondansetron ODT (ZOFRAN-ODT) 4 MG disintegrating tablet Place 1 tablet on the tongue Every 8 (Eight) Hours As Needed for Nausea or Vomiting. 30 tablet 2   • Polyvinyl Alcohol-Povidone (REFRESH OP) Apply 1 drop to eye(s) as directed by provider 2 (Two) Times a Day.       Current Facility-Administered Medications on File Prior to Visit   Medication Dose Route Frequency Provider Last Rate Last Admin   • ondansetron (ZOFRAN) injection 4 mg  4 mg Intravenous Q6H PRN Selena White MD           Allergies   Allergen Reactions   • Nitroglycerin Unknown (See Comments)     chills       Vitals:    09/08/22 1405   Weight: 81.6 kg (180 lb)   Height: 170.2 cm (67\")     Body mass index is 28.19 kg/m².   Constitutional:       Appearance: Well-developed.   Eyes:      General: No scleral icterus.     Conjunctiva/sclera: Conjunctivae normal.   HENT:      Head: Normocephalic and atraumatic.   Neck:      Thyroid: No thyromegaly.      Vascular: Normal carotid pulses. No carotid bruit, hepatojugular reflux or JVD.      Trachea: No tracheal deviation. "   Pulmonary:      Effort: No respiratory distress.      Breath sounds: Normal breath sounds. No decreased breath sounds. No wheezing. No rhonchi. No rales.   Chest:      Chest wall: Not tender to palpatation.   Cardiovascular:      Normal rate. Regular rhythm.      Murmurs: There is a grade 2/6 early systolic murmur.      No gallop.   Pulses:     Carotid: 2+ bilaterally.     Radial: 2+ bilaterally.     Femoral: 2+ bilaterally.     Dorsalis pedis: 2+ bilaterally.     Posterior tibial: 2+ bilaterally.  Edema:     Peripheral edema absent.   Abdominal:      General: Bowel sounds are normal. There is no distension.      Palpations: Abdomen is soft.      Tenderness: There is no abdominal tenderness.   Musculoskeletal:         General: No deformity.      Cervical back: Normal range of motion and neck supple. Skin:     Findings: No erythema or rash.   Neurological:      Mental Status: Alert and oriented to person, place, and time.      Sensory: No sensory deficit.   Psychiatric:         Behavior: Behavior normal.         Lab Results   Component Value Date    WBC 6.62 09/08/2022    HGB 13.5 09/08/2022    HCT 40.4 09/08/2022    MCV 94.4 09/08/2022     09/08/2022       Lab Results   Component Value Date    GLUCOSE 102 (H) 09/08/2022    BUN 14 09/08/2022    CREATININE 1.16 09/08/2022    EGFRIFNONA 63 02/22/2022    EGFRIFAFRI 73 02/22/2022    BCR 12.1 09/08/2022    K 4.1 09/08/2022    CO2 29.4 (H) 09/08/2022    CALCIUM 9.6 09/08/2022    PROTENTOTREF 6.9 05/23/2022    ALBUMIN 4.00 09/08/2022    LABIL2 2.0 05/23/2022    AST 19 09/08/2022    ALT 12 09/08/2022       Lab Results   Component Value Date    GLUCOSE 102 (H) 09/08/2022    CALCIUM 9.6 09/08/2022     09/08/2022    K 4.1 09/08/2022    CO2 29.4 (H) 09/08/2022     09/08/2022    BUN 14 09/08/2022    CREATININE 1.16 09/08/2022    EGFRIFAFRI 73 02/22/2022    EGFRIFNONA 63 02/22/2022    BCR 12.1 09/08/2022    ANIONGAP 5.6 09/08/2022       Lab Results   Component  Value Date    CHOL 106 09/29/2021    CHLPL 136 05/23/2022    TRIG 94 05/23/2022    HDL 49 05/23/2022    LDL 69 05/23/2022         ECG 12 Lead    Date/Time: 9/8/2022 2:32 PM  Performed by: Manpreet Vargas MD  Authorized by: Manpreet Vargas MD   Comparison: compared with previous ECG from 6/9/2022  Similar to previous ECG  Rhythm: sinus rhythm  Rate: normal  QRS axis: normal               10/1/2021  No evidence for pulmonary hypertension is present.     Left ventricle angiogram was not performed due to inability to cross the aortic valve.  Aortogram revealed normal-sized aorta with significantly reduced aortic valve motion.  No evidence for aortic regurgitation is present.     Left main coronary artery is normal.  Left anterior descending artery normal.  Circumflex coronary artery is normal.  Right coronary artery is normal (large and dominant)                Results for orders placed during the hospital encounter of 04/25/22    Adult Transthoracic Echo Complete W/ Cont if Necessary Per Protocol    Interpretation Summary  Indication  Status post TAVR  Dizziness    Technically satisfactory study.  Mitral valve is structurally normal.  Tricuspid valve is structurally normal.  Aortic TAVR valve is structurally normal.  No evidence for aortic regurgitation is present.  Pulmonic valve could not be well visualized.  No evidence for mitral tricuspid or aortic regurgitation is seen by Doppler study.  Left atrium is normal in size.  Right atrium is normal in size.  Left ventricle is normal in size and contractility with ejection fraction of 60%.  Right ventricle is normal in size.  Atrial septum is intact.  Aorta is normal.  No pericardial effusion or intracardiac thrombus is seen.    Impression  TAVR valve appears to be intact.  Structurally and functionally normal cardiac valves.  Left ventricular size and contractility is normal with ejection fraction of 60%.    11/3/21  CONCLUSIONS:  Successful deployment of  a 29 mm Medtronic Evolut Pro transcatheter aortic heart valve.    11/18/21  · A relatively benign monitor study.  Sinus rhythm with rare atrial and ventricular ectopic beats.  Left bundle branch block present.  2 patient triggered events with report of dizziness and shortness of breath.  Patient noted to be in a normal sinus rhythm during the triggered events with no arrhythmia      DISCUSSION/SUMMARY   77-year-old male with a medical history of severe degenerative aortic valve stenosis, dyspnea on exertion, hypertension and gastroesophageal reflux disease who presents back in follow-up after his transcatheter aortic valve replacement.  He underwent a successful 29 mm Medtronic evolut pro transcatheter aortic valve replacement in November 2021 with an excellent result.  He had a transient left bundle branch block which resolved.  His echo today still shows no paravalvular regurgitation and a valve that is functioning very well    1.  Severe degenerative aortic valve stenosis: Nonrheumatic.  Status post transcatheter aortic valve replacement as above with 29 mm Medtronic evolut pro valve  -I have reviewed his echocardiogram today.  I think the valve looks great.  No paravalvular regurgitation and normal gradient across the aortic valve.  -Recommend restarting aspirin 81 mg p.o. daily enteric-coated.  He is aware.    2.  Essential hypertension: Well-controlled.  Continue losartan and metoprolol succinate therapy at current dose.  No hyperkalemia or renal insufficiency noted on evaluation of lab work.     I will see him back as needed.  Follow-up with Dr. Yeimi Frederick

## 2022-09-16 DIAGNOSIS — K21.9 GASTROESOPHAGEAL REFLUX DISEASE WITHOUT ESOPHAGITIS: ICD-10-CM

## 2022-09-16 RX ORDER — OMEPRAZOLE 20 MG/1
20 CAPSULE, DELAYED RELEASE ORAL DAILY
Qty: 30 CAPSULE | Refills: 0 | Status: SHIPPED | OUTPATIENT
Start: 2022-09-16 | End: 2023-01-11 | Stop reason: SDUPTHER

## 2022-09-19 ENCOUNTER — OFFICE VISIT (OUTPATIENT)
Dept: FAMILY MEDICINE CLINIC | Facility: CLINIC | Age: 77
End: 2022-09-19

## 2022-09-19 VITALS
DIASTOLIC BLOOD PRESSURE: 70 MMHG | RESPIRATION RATE: 17 BRPM | OXYGEN SATURATION: 98 % | HEART RATE: 75 BPM | WEIGHT: 181.2 LBS | TEMPERATURE: 97.8 F | HEIGHT: 67 IN | BODY MASS INDEX: 28.44 KG/M2 | SYSTOLIC BLOOD PRESSURE: 130 MMHG

## 2022-09-19 DIAGNOSIS — E66.3 OVERWEIGHT WITH BODY MASS INDEX (BMI) OF 28 TO 28.9 IN ADULT: Primary | ICD-10-CM

## 2022-09-19 DIAGNOSIS — I10 ESSENTIAL HYPERTENSION: ICD-10-CM

## 2022-09-19 DIAGNOSIS — R42 LIGHTHEADEDNESS: ICD-10-CM

## 2022-09-19 PROCEDURE — 99214 OFFICE O/P EST MOD 30 MIN: CPT | Performed by: STUDENT IN AN ORGANIZED HEALTH CARE EDUCATION/TRAINING PROGRAM

## 2022-09-19 RX ORDER — METOPROLOL SUCCINATE 50 MG/1
50 TABLET, EXTENDED RELEASE ORAL DAILY
Qty: 90 TABLET | Refills: 0 | Status: SHIPPED | OUTPATIENT
Start: 2022-09-19 | End: 2022-11-14

## 2022-09-19 NOTE — PROGRESS NOTES
Subjective   Luis Emery is a 77 y.o. male.     Chief Complaint   Patient presents with   • Establish Care   • Hypertension   • lightheaded ness       History of Present Illness  Establish care :   Patient is here today and wants to establish care with a new PCP as he was a former patient of Dr. White.   Dizziness  This is a recurrent problem. The current episode started more than 1 month ago. The problem occurs intermittently. The problem has been unchanged. Pertinent negatives include no chest pain, headaches or neck pain. Associated symptoms comments: Patient states that he has been getting lightheaded here an there and he states that he as also getting some double vision and he states that since stopping [facility] he states that his double vision has improved but he states that the light headed ness is still there at times. . Nothing aggravates the symptoms. He has tried nothing for the symptoms. The treatment provided no relief.   Hypertension  This is a chronic problem. The current episode started more than 1 year ago. The problem has been gradually improving since onset. The problem is controlled. Associated symptoms include malaise/fatigue. Pertinent negatives include no anxiety, blurred vision, chest pain, headaches, neck pain, orthopnea, palpitations, peripheral edema, PND, shortness of breath or sweats. Risk factors for coronary artery disease include obesity and male gender. Past treatments include ACE inhibitors, calcium channel blockers and beta blockers. Current antihypertension treatment includes calcium channel blockers, beta blockers, angiotensin blockers and ACE inhibitors. The current treatment provides moderate improvement. There are no compliance problems.  Hypertensive end-organ damage includes heart failure. There is no history of angina, kidney disease, CAD/MI, CVA, left ventricular hypertrophy, PVD or retinopathy. There is no history of chronic renal  disease, coarctation of the aorta, hyperaldosteronism, hypercortisolism, hyperparathyroidism, a hypertension causing med, pheochromocytoma, renovascular disease, sleep apnea or a thyroid problem.        The following portions of the patient's history were reviewed and updated as appropriate: allergies, current medications, past family history, past medical history, past social history, past surgical history and problem list.    Allergies:  Allergies   Allergen Reactions   • Nitroglycerin Unknown (See Comments)     chills       Social History:  Social History     Socioeconomic History   • Marital status:    • Number of children: 4   Tobacco Use   • Smoking status: Never Smoker   • Smokeless tobacco: Never Used   • Tobacco comment: caffeine use   Vaping Use   • Vaping Use: Never used   Substance and Sexual Activity   • Alcohol use: No   • Drug use: Never   • Sexual activity: Defer       Family History:  Family History   Problem Relation Age of Onset   • Testicular cancer Father    • Coronary artery disease Father    • Hypertension Father    • Hypertension Other    • Cancer Mother    • Hypertension Sister    • Malig Hyperthermia Neg Hx        Past Medical History :  Patient Active Problem List   Diagnosis   • Personal history of prostate cancer   • Onychomycosis   • Increased frequency of urination   • Hyperlipidemia   • History of measles   • Gastroesophageal reflux disease without esophagitis   • Essential hypertension   • Medicare annual wellness visit, subsequent   • Chronic angle-closure glaucoma   • Anemia   • Heart murmur   • Aortic stenosis, severe   • Full code status   • Bradycardia   • S/P TAVR (transcatheter aortic valve replacement)   • Pacemaker   • CKD (chronic kidney disease) stage 2, GFR 60-89 ml/min   • Leg swelling   • Overweight with body mass index (BMI) of 28 to 28.9 in adult       Medication List:  Outpatient Encounter Medications as of 9/19/2022   Medication Sig Dispense Refill   •  amLODIPine (NORVASC) 5 MG tablet TAKE 1 TABLET BY MOUTH DAILY 90 tablet 0   • atorvastatin (LIPITOR) 20 MG tablet Take 1 tablet by mouth Daily. 90 tablet 2   • glucosamine-chondroitin 500-400 MG per tablet Take 1 tablet by mouth Daily.     • latanoprost (XALATAN) 0.005 % ophthalmic solution      • Loratadine 10 MG capsule Take 1 capsule by mouth Daily.     • losartan (COZAAR) 100 MG tablet Take 1 tablet by mouth Daily. 90 tablet 2   • methylcellulose, Laxative, (CITRUCEL) 500 MG tablet tablet Take 1 tablet by mouth 2 (two) times a day.     • metoprolol succinate XL (TOPROL-XL) 50 MG 24 hr tablet Take 1 tablet by mouth Daily for 90 days. 90 tablet 0   • Multiple Vitamin (MULTIVITAMINS PO) Take 1 capsule by mouth Daily.     • Omega-3 Fatty Acids (FISH OIL CONCENTRATE) 300 MG capsule Take 1 capsule by mouth Daily.     • omeprazole (priLOSEC) 20 MG capsule TAKE 1 CAPSULE BY MOUTH DAILY 30 capsule 0   • Polyvinyl Alcohol-Povidone (REFRESH OP) Apply 1 drop to eye(s) as directed by provider 2 (Two) Times a Day.     • [DISCONTINUED] ondansetron ODT (ZOFRAN-ODT) 4 MG disintegrating tablet Place 1 tablet on the tongue Every 8 (Eight) Hours As Needed for Nausea or Vomiting. 30 tablet 2   • [DISCONTINUED] metoprolol succinate XL (TOPROL-XL) 50 MG 24 hr tablet Take 1 tablet by mouth Daily for 90 days. 90 tablet 2   • [DISCONTINUED] ondansetron (ZOFRAN) injection 4 mg        No facility-administered encounter medications on file as of 9/19/2022.       Past Surgical History:  Past Surgical History:   Procedure Laterality Date   • ANKLE TENDON REPAIR  July 5th, 2017    Dr. Armijo   • AORTIC VALVE REPAIR/REPLACEMENT N/A 11/02/2021    Procedure: TTE TRANSFEMORAL TRANSCATHETER AORTIC VALVE REPLACEMENT with IntraOp TTE and possible open surgical rescue.;  Surgeon: Preston Hager MD;  Location: [facility] 18/19;  Service: Cardiothoracic;  Laterality: N/A;   • AORTIC VALVE REPAIR/REPLACEMENT N/A 11/02/2021    Procedure:  "Transfemoral Transcatheter Aortic Valve Replacement with IntraOp TTE and possible open surgical rescue.;  Surgeon: Manpreet Vargas MD;  Location: [facility] OR 18/19;  Service: Cardiovascular;  Laterality: N/A;   • AORTIC VALVE SURGERY     • CARDIAC CATHETERIZATION  2005    no stents   • CARDIAC CATHETERIZATION N/A 10/01/2021    Procedure: Right and Left Heart Cath with Coronar Angiography;  Surgeon: Luis Angel Frederick MD;  Location: [facility] CATH INVASIVE LOCATION;  Service: Cardiovascular;  Laterality: N/A;   • CARDIAC CATHETERIZATION N/A 10/01/2021    Procedure: Aortic root aortogram;  Surgeon: Luis Angel Frederick MD;  Location: [facility] CATH INVASIVE LOCATION;  Service: Cardiovascular;  Laterality: N/A;   • CARDIAC VALVE REPLACEMENT  11/02/2021   • CHOLECYSTECTOMY  03/2010    [facility].   • PROSTATECTOMY  01/1997    [facility].       Review of Systems:  Review of Systems   Constitutional: Positive for malaise/fatigue.   Eyes: Negative for blurred vision.   Respiratory: Negative for shortness of breath.    Cardiovascular: Negative for chest pain, palpitations, orthopnea and PND.   Musculoskeletal: Negative for neck pain.   Neurological: Positive for dizziness.       I have reviewed and confirmed the accuracy of the HPI and ROS as documented by the MA/LPN/RN Liv Pina MD    Vital Signs:  Visit Vitals  /70   Pulse 75   Temp 97.8 °F (36.6 °C)   Resp 17   Ht 170.2 cm (67\")   Wt 82.2 kg (181 lb 3.2 oz)   SpO2 98%   BMI 28.38 kg/m²       Physical Exam  Constitutional:       Appearance: Normal appearance.   HENT:      Head: Normocephalic and atraumatic.      Mouth/Throat:      Mouth: Mucous membranes are moist.      Pharynx: Oropharynx is clear.   Eyes:      General: No scleral icterus.     Extraocular Movements: Extraocular movements intact.      Conjunctiva/sclera: Conjunctivae normal.      Pupils: Pupils are equal, round, and reactive to light.   Cardiovascular:      Rate " and Rhythm: Normal rate and regular rhythm.      Heart sounds:     No friction rub. No gallop.      Comments: murmuer associated with recent TAVR within the past year appreciated on auscultation  Pulmonary:      Effort: Pulmonary effort is normal. No respiratory distress.      Breath sounds: Normal breath sounds. No wheezing.   Abdominal:      General: Bowel sounds are normal. There is no distension.      Palpations: Abdomen is soft.      Tenderness: There is no abdominal tenderness.   Musculoskeletal:         General: No swelling, tenderness or deformity. Normal range of motion.      Cervical back: Normal range of motion. No rigidity or tenderness.   Lymphadenopathy:      Cervical: No cervical adenopathy.   Skin:     General: Skin is warm.      Capillary Refill: Capillary refill takes less than 2 seconds.      Findings: No lesion or rash.   Neurological:      General: No focal deficit present.      Mental Status: He is alert and oriented to person, place, and time. Mental status is at baseline.      Gait: Gait normal.   Psychiatric:         Behavior: Behavior normal.         Thought Content: Thought content normal.         Assessment and Plan:  Problem List Items Addressed This Visit        Cardiac and Vasculature    Essential hypertension    Relevant Medications    metoprolol succinate XL (TOPROL-XL) 50 MG 24 hr tablet       Other    Overweight with body mass index (BMI) of 28 to 28.9 in adult - Primary    Current Assessment & Plan     Patient's (Body mass index is 28.38 kg/m².) indicates that they are overweight with health conditions that include hypertension . Weight is worsening. BMI is is above average; BMI management plan is completed. We discussed portion control and increasing exercise.            Other Visit Diagnoses     Lightheadedness              An After Visit Summary and PPPS were given to the patient.   patient presents today for routine fu on chronic conditions  Did have TAVR within the past year,  doing well. Interventional signed of but he still follows with his cardiologist  He states he's had some mild dizzy spells for a while now, states these have decreased to only 1x per week instead of almost daily s/p TAVR  Pt requesting refill of metoprolol, will hold off on labs since upcoming annual visit is November    I wore protective equipment throughout this patient encounter to include mask and eye protection. Hand hygiene was performed before donning protective equipment and after removal when leaving the room.

## 2022-09-19 NOTE — ASSESSMENT & PLAN NOTE
Patient's (Body mass index is 28.38 kg/m².) indicates that they are overweight with health conditions that include hypertension . Weight is worsening. BMI is is above average; BMI management plan is completed. We discussed portion control and increasing exercise.

## 2022-10-08 DIAGNOSIS — I10 ESSENTIAL HYPERTENSION: ICD-10-CM

## 2022-10-08 DIAGNOSIS — E78.2 MIXED HYPERLIPIDEMIA: ICD-10-CM

## 2022-10-10 RX ORDER — ATORVASTATIN CALCIUM 20 MG/1
20 TABLET, FILM COATED ORAL DAILY
Qty: 90 TABLET | Refills: 2 | Status: SHIPPED | OUTPATIENT
Start: 2022-10-10 | End: 2023-01-09 | Stop reason: SDUPTHER

## 2022-10-10 RX ORDER — LOSARTAN POTASSIUM 100 MG/1
100 TABLET ORAL DAILY
Qty: 90 TABLET | Refills: 2 | Status: SHIPPED | OUTPATIENT
Start: 2022-10-10 | End: 2023-01-09 | Stop reason: SDUPTHER

## 2022-11-11 DIAGNOSIS — I10 ESSENTIAL HYPERTENSION: ICD-10-CM

## 2022-11-14 RX ORDER — METOPROLOL SUCCINATE 50 MG/1
TABLET, EXTENDED RELEASE ORAL
Qty: 90 TABLET | Refills: 0 | Status: SHIPPED | OUTPATIENT
Start: 2022-11-14 | End: 2023-01-09 | Stop reason: SDUPTHER

## 2022-11-21 ENCOUNTER — OFFICE VISIT (OUTPATIENT)
Dept: FAMILY MEDICINE CLINIC | Facility: CLINIC | Age: 77
End: 2022-11-21

## 2022-11-21 VITALS
OXYGEN SATURATION: 98 % | RESPIRATION RATE: 16 BRPM | HEIGHT: 67 IN | DIASTOLIC BLOOD PRESSURE: 84 MMHG | BODY MASS INDEX: 27.94 KG/M2 | HEART RATE: 80 BPM | TEMPERATURE: 97.8 F | WEIGHT: 178 LBS | SYSTOLIC BLOOD PRESSURE: 128 MMHG

## 2022-11-21 DIAGNOSIS — R73.9 ELEVATED SERUM GLUCOSE: ICD-10-CM

## 2022-11-21 DIAGNOSIS — I10 ESSENTIAL HYPERTENSION: ICD-10-CM

## 2022-11-21 DIAGNOSIS — E78.2 MIXED HYPERLIPIDEMIA: ICD-10-CM

## 2022-11-21 DIAGNOSIS — Z00.00 MEDICARE ANNUAL WELLNESS VISIT, SUBSEQUENT: Primary | ICD-10-CM

## 2022-11-21 PROCEDURE — 3008F BODY MASS INDEX DOCD: CPT | Performed by: STUDENT IN AN ORGANIZED HEALTH CARE EDUCATION/TRAINING PROGRAM

## 2022-11-21 PROCEDURE — G0439 PPPS, SUBSEQ VISIT: HCPCS | Performed by: STUDENT IN AN ORGANIZED HEALTH CARE EDUCATION/TRAINING PROGRAM

## 2022-11-21 PROCEDURE — 2014F MENTAL STATUS ASSESS: CPT | Performed by: STUDENT IN AN ORGANIZED HEALTH CARE EDUCATION/TRAINING PROGRAM

## 2022-11-21 RX ORDER — ASPIRIN 81 MG/1
81 TABLET ORAL DAILY
COMMUNITY

## 2022-11-21 NOTE — PROGRESS NOTES
QUICK REFERENCE INFORMATION:  The ABCs of the Annual Wellness Visit    Subsequent Medicare Wellness Visit     HEALTH RISK ASSESSMENT    : 1945    Recent Hospitalizations:  No hospitalization(s) within the last year..  ccc    Current Medical Providers:  Patient Care Team:  Liv Pina MD as PCP - General (Family Medicine)  Luis Angel Frederick MD as Consulting Physician (Cardiology)    Smoking Status:  Social History     Tobacco Use   Smoking Status Never   Smokeless Tobacco Never   Tobacco Comments    caffeine use       Alcohol Consumption:  Social History     Substance and Sexual Activity   Alcohol Use No       Depression Screen:   PHQ-2/PHQ-9 Depression Screening 2022   Retired PHQ-9 Total Score -   Retired Total Score -   Little Interest or Pleasure in Doing Things 0-->not at all   Feeling Down, Depressed or Hopeless 0-->not at all   PHQ-9: Brief Depression Severity Measure Score 0       Health Habits and Functional and Cognitive Screening:  Functional & Cognitive Status 2022   Do you have difficulty preparing food and eating? No   Do you have difficulty bathing yourself, getting dressed or grooming yourself? No   Do you have difficulty using the toilet? No   Do you have difficulty moving around from place to place? No   Do you have trouble with steps or getting out of a bed or a chair? No   Current Diet Unhealthy Diet   Dental Exam Up to date   Eye Exam Up to date   Exercise (times per week) 3 times per week   Current Exercises Include Cardiovascular Workout;Swim Aerobics Class;Treadmill        Exercise Comment -   Current Exercise Activities Include -   Do you need help using the phone?  No   Are you deaf or do you have serious difficulty hearing?  No   Do you need help with transportation? No   Do you need help shopping? No   Do you need help preparing meals?  No   Do you need help with housework?  No   Do you need help with laundry? No   Do you need help taking your medications? No   Do  you need help managing money? No   Do you ever drive or ride in a car without wearing a seat belt? No   Have you felt unusual stress, anger or loneliness in the last month? No   Who do you live with? Spouse   If you need help, do you have trouble finding someone available to you? No   Have you been bothered in the last four weeks by sexual problems? No   Do you have difficulty concentrating, remembering or making decisions? No       Does the patient have evidence of cognitive impairment? No    Mini-Mental State Examination (MMSE)        Instructions: Ask the questions in the order listed. Score one point for each correct response within each question or activity.      Maximum Score  Patient’s Score  Questions    5    “What is the year?  Season?  Date?  Day of the week?  Month?”    5    “Where are we now: State?  County?  Town/city?  Hospital?  Floor?”    3    The examiner names three unrelated objects clearly and slowly, then asks the patient to name all three of them. The patient’s response is used for scoring. The examiner repeats them until patient learns all of them, if possible. Number of trials: ___________    5    “I would like you to count backward from 100 by sevens.” (93, 86, 79, 72, 65, …) Stop after five answers.   Alternative: “Spell WORLD backwards.” ([id number])    3    “Earlier I told you the names of three things. Can you tell me what those were?”    2    Show the patient two simple objects, such as a wristwatch and a pencil, and ask the patient to name them.    1    “Repeat the phrase: ‘No ifs, ands, or buts.’”    3    “Take the paper in your right hand, fold it in half, and put it on the floor.”   (The examiner gives the patient a piece of blank paper.)    1    “Please read this and do what it says.” (Written instruction is “Close your eyes.”)    1    “Make up and write a sentence about anything.” (This sentence must contain a noun and a verb.)    1    “Please copy this picture.” (The examiner  gives the patient a blank piece of paper and asks him/her to draw the symbol below. All 10 angles must be present and two must intersect.)             30    TOTAL          Aspirin use counseling: Taking ASA appropriately as indicated    Recent Lab Results:       Lab Results   Component Value Date    HGBA1C 5.50 10/29/2021     Lab Results   Component Value Date    CHOL 106 09/29/2021    TRIG 94 05/23/2022    HDL 49 05/23/2022    VLDL 18 05/23/2022    LDLHDL 1.25 09/29/2021       Age-appropriate Screening Schedule:  Refer to the list below for future screening recommendations based on patient's age, sex and/or medical conditions. Orders for these recommended tests are listed in the plan section. The patient has been provided with a written plan.    Health Maintenance   Topic Date Due   • LIPID PANEL  05/23/2023   • TDAP/TD VACCINES (3 - Td or Tdap) 04/18/2028   • INFLUENZA VACCINE  Completed   • ZOSTER VACCINE  Completed        Subjective   History of Present Illness:  Luis Emery is a 77 y.o. male who presents for an Annual Wellness Visit.  Compared to one year ago, the patient feels his physical health is better.  Compared to one year ago, the patient feels his mental health is the same.  Hypertension  This is a chronic problem. The current episode started more than 1 year ago. The problem has been resolved since onset. The problem is controlled. Pertinent negatives include no chest pain, headaches, orthopnea, palpitations, PND or shortness of breath. Risk factors for coronary artery disease include dyslipidemia and male gender. Current antihypertension treatment includes calcium channel blockers, angiotensin blockers and beta blockers. The current treatment provides moderate improvement. There are no compliance problems.    Hyperlipidemia  This is a chronic problem. The current episode started more than 1 year ago. He has no history of diabetes or obesity. Pertinent negatives include no chest pain, leg pain or  shortness of breath. Current antihyperlipidemic treatment includes statins and herbal therapy. The current treatment provides mild improvement of lipids. There are no compliance problems.  Risk factors for coronary artery disease include male sex, dyslipidemia and hypertension.        Allergies:  Allergies   Allergen Reactions   • Nitroglycerin Unknown (See Comments)     chills       Social History:  Social History     Socioeconomic History   • Marital status:    • Number of children: 4   Tobacco Use   • Smoking status: Never   • Smokeless tobacco: Never   • Tobacco comments:     caffeine use   Vaping Use   • Vaping Use: Never used   Substance and Sexual Activity   • Alcohol use: No   • Drug use: Never   • Sexual activity: Defer       Family History:  Family History   Problem Relation Age of Onset   • Testicular cancer Father    • Coronary artery disease Father    • Hypertension Father    • Hypertension Other    • Cancer Mother    • Hypertension Sister    • Malig Hyperthermia Neg Hx        Past Medical History :  Active Ambulatory Problems     Diagnosis Date Noted   • Personal history of prostate cancer 09/12/2019   • Onychomycosis 09/12/2019   • Increased frequency of urination 09/12/2019   • Hyperlipidemia 09/12/2019   • History of measles 09/12/2019   • Gastroesophageal reflux disease without esophagitis 09/12/2019   • Essential hypertension 09/12/2019   • Medicare annual wellness visit, subsequent 09/12/2019   • Chronic angle-closure glaucoma 09/12/2019   • Anemia 09/12/2019   • Heart murmur 02/20/2020   • Aortic stenosis, severe 02/20/2020   • Full code status 12/13/2020   • Bradycardia 09/28/2021   • S/P TAVR (transcatheter aortic valve replacement) 11/10/2021   • Pacemaker 03/25/2022   • CKD (chronic kidney disease) stage 2, GFR 60-89 ml/min 06/13/2022   • Leg swelling 06/13/2022   • Overweight with body mass index (BMI) of 28 to 28.9 in adult 06/13/2022     Resolved Ambulatory Problems     Diagnosis  Date Noted   • Influenza vaccine administered 09/12/2019   • Screening PSA (prostate specific antigen) 09/12/2019   • DNR (do not resuscitate) 09/12/2019     Past Medical History:   Diagnosis Date   • Abdominal pain    • Acute recurrent frontal sinusitis    • Acute upper respiratory infection    • Aortic valve replaced 11/2/2021   • Cancer (HCC)    • Cholelithiasis 03/29/2010   • Overweight    • Personal history of malignant neoplasm of prostate    • Screening for depression    • Urinary frequency        Medication List:  Outpatient Encounter Medications as of 11/21/2022   Medication Sig Dispense Refill   • amLODIPine (NORVASC) 5 MG tablet TAKE 1 TABLET BY MOUTH DAILY 90 tablet 0   • aspirin 81 MG EC tablet Take 81 mg by mouth Daily.     • atorvastatin (LIPITOR) 20 MG tablet TAKE 1 TABLET BY MOUTH DAILY 90 tablet 2   • glucosamine-chondroitin 500-400 MG per tablet Take 1 tablet by mouth Daily.     • latanoprost (XALATAN) 0.005 % ophthalmic solution      • Loratadine 10 MG capsule Take 1 capsule by mouth Daily.     • losartan (COZAAR) 100 MG tablet TAKE 1 TABLET BY MOUTH DAILY 90 tablet 2   • methylcellulose, Laxative, (CITRUCEL) 500 MG tablet tablet Take 1 tablet by mouth 2 (two) times a day.     • metoprolol succinate XL (TOPROL-XL) 50 MG 24 hr tablet TAKE 1 TABLET BY MOUTH DAILY 90 tablet 0   • Multiple Vitamin (MULTIVITAMINS PO) Take 1 capsule by mouth Daily.     • Omega-3 Fatty Acids (FISH OIL CONCENTRATE) 300 MG capsule Take 1 capsule by mouth Daily.     • omeprazole (priLOSEC) 20 MG capsule TAKE 1 CAPSULE BY MOUTH DAILY 30 capsule 0   • Polyvinyl Alcohol-Povidone (REFRESH OP) Apply 1 drop to eye(s) as directed by provider 2 (Two) Times a Day.       No facility-administered encounter medications on file as of 11/21/2022.     Reviewed use of high risk medication in the elderly: yes  Reviewed for potential of harmful drug interactions in the elderly: yes    Past Surgical History:  Past Surgical History:    Procedure Laterality Date   • ANKLE TENDON REPAIR  July 5th, 2017    Dr. Armijo   • AORTIC VALVE REPAIR/REPLACEMENT N/A 11/02/2021    Procedure: TTE TRANSFEMORAL TRANSCATHETER AORTIC VALVE REPLACEMENT with IntraOp TTE and possible open surgical rescue.;  Surgeon: Preston Hager MD;  Location: [facility] OR 18/19;  Service: Cardiothoracic;  Laterality: N/A;   • AORTIC VALVE REPAIR/REPLACEMENT N/A 11/02/2021    Procedure: Transfemoral Transcatheter Aortic Valve Replacement with IntraOp TTE and possible open surgical rescue.;  Surgeon: Manpreet Vargas MD;  Location: [facility] OR 18/19;  Service: Cardiovascular;  Laterality: N/A;   • AORTIC VALVE SURGERY     • CARDIAC CATHETERIZATION  2005    no stents   • CARDIAC CATHETERIZATION N/A 10/01/2021    Procedure: Right and Left Heart Cath with Coronar Angiography;  Surgeon: Luis Angel Frederick MD;  Location: [facility] CATH INVASIVE LOCATION;  Service: Cardiovascular;  Laterality: N/A;   • CARDIAC CATHETERIZATION N/A 10/01/2021    Procedure: Aortic root aortogram;  Surgeon: Luis Angel Frederick MD;  Location: [facility] CATH INVASIVE LOCATION;  Service: Cardiovascular;  Laterality: N/A;   • CARDIAC VALVE REPLACEMENT  11/02/2021   • CHOLECYSTECTOMY  03/2010    [facility].   • PROSTATECTOMY  01/1997    [facility].        The following portions of the patient's history were reviewed and updated as appropriate: allergies, current medications, past family history, past medical history, past social history, past surgical history and problem list.    Review Of Systems:  Review of Systems   Respiratory: Negative for shortness of breath.    Cardiovascular: Negative for chest pain, palpitations, orthopnea and PND.     I have reviewed and confirmed the accuracy of the ROS as documented by the MA/LPN/RN Liv Pina MD    Objective     Physical Exam:  Vital Signs:  Visit Vitals  /84 (BP Location: Right arm, Patient Position: Sitting, Cuff  "Size: Adult)   Pulse 80   Temp 97.8 °F (36.6 °C) (Temporal)   Resp 16   Ht 170.2 cm (67\")   Wt 80.7 kg (178 lb)   SpO2 98% Comment: room air   BMI 27.88 kg/m²     BMI is >= 25 and <30. (Overweight) The following options were offered after discussion;: exercise counseling/recommendations and nutrition counseling/recommendations      Physical Exam  Constitutional:       Appearance: Normal appearance. He is obese.   HENT:      Head: Normocephalic and atraumatic.   Eyes:      General: No scleral icterus.     Extraocular Movements: Extraocular movements intact.      Conjunctiva/sclera: Conjunctivae normal.      Pupils: Pupils are equal, round, and reactive to light.   Cardiovascular:      Rate and Rhythm: Normal rate and regular rhythm.      Heart sounds:     No friction rub. No gallop.   Pulmonary:      Effort: Pulmonary effort is normal. No respiratory distress.      Breath sounds: Normal breath sounds. No wheezing.   Abdominal:      General: Bowel sounds are normal. There is no distension.      Palpations: Abdomen is soft.      Tenderness: There is no abdominal tenderness.   Musculoskeletal:         General: No swelling, tenderness or deformity. Normal range of motion.      Cervical back: Normal range of motion. No rigidity or tenderness.   Lymphadenopathy:      Cervical: No cervical adenopathy.   Skin:     General: Skin is warm.      Findings: No lesion or rash.   Neurological:      General: No focal deficit present.      Mental Status: He is alert and oriented to person, place, and time. Mental status is at baseline.      Gait: Gait normal.   Psychiatric:         Behavior: Behavior normal.         Thought Content: Thought content normal.         Assessment & Plan   Assessment and Plan:  Patient Self-Management and Personalized Health Advice  The patient has been provided with information about: diet and exercise and preventive services including:   · Diabetes Screening-Lab Order for either glucose quantitative blood " (except reagent strip), glucose;post glucose dose(includes glucose), or glucose tolerance test-3 specimens(includes glucose).    Advance Care Planning:  ACP discussion was held with the patient during this visit. Patient has an advance directive (not in EMR), copy requested.  Identification of Risk Factors:  Risk factors include: uptodate.    Diagnoses and all orders for this visit:    1. Medicare annual wellness visit, subsequent (Primary)    2. Essential hypertension    3. Mixed hyperlipidemia    4. Elevated serum glucose          Follow Up:  No follow-ups on file.   Recommend follow-up in 3 to 6 months or sooner if needed  He does have a history on his previous labs in September of an elevated glucose in the 120s, will consider adding on an A1c for next set of labs in 3 to 6 months  Patient does not have any acute complaints today and is not in need of any refills instructed him to call our office or contact our nurse if he needs me sooner than his upcoming scheduled appointment in the next few months.  He is otherwise up-to-date on his preventive screening recommendations    An After Visit Summary and PPPS with all of these plans were given to the patient.

## 2023-01-09 DIAGNOSIS — I10 ESSENTIAL HYPERTENSION: ICD-10-CM

## 2023-01-09 DIAGNOSIS — K21.9 GASTROESOPHAGEAL REFLUX DISEASE WITHOUT ESOPHAGITIS: ICD-10-CM

## 2023-01-09 DIAGNOSIS — E78.2 MIXED HYPERLIPIDEMIA: ICD-10-CM

## 2023-01-09 RX ORDER — OMEPRAZOLE 20 MG/1
20 CAPSULE, DELAYED RELEASE ORAL DAILY
Qty: 30 CAPSULE | Refills: 0 | Status: SHIPPED | OUTPATIENT
Start: 2023-01-09 | End: 2023-01-11 | Stop reason: SDUPTHER

## 2023-01-09 NOTE — TELEPHONE ENCOUNTER
Caller: Luis Emery    Relationship to patient: Self    Best call back number: [phone]    Patient is needing: PATIENT IS REQUESTING A PRESCRIPTION WITH A 90 DAY SUPPLY OF THE omeprazole (priLOSEC) 20 MG capsule.

## 2023-01-09 NOTE — TELEPHONE ENCOUNTER
Caller: Luis Emery    Relationship: Self    Best call back number: [phone]      Requested Prescriptions:   Requested Prescriptions     Pending Prescriptions Disp Refills   • metoprolol succinate XL (TOPROL-XL) 50 MG 24 hr tablet 90 tablet 0     Sig: Take 1 tablet by mouth Daily.   • amLODIPine (NORVASC) 5 MG tablet 90 tablet 0     Sig: Take 1 tablet by mouth Daily.   • losartan (COZAAR) 100 MG tablet 90 tablet 2     Sig: Take 1 tablet by mouth Daily.   • atorvastatin (LIPITOR) 20 MG tablet 90 tablet 2     Sig: Take 1 tablet by mouth Daily.        Pharmacy where request should be sent: [id number] - [facility] [place] [address] AT [facility] OF  [address] [facility] - [phone] [place] [phone] FX     Does the patient have less than a 3 day supply:  [] Yes  [x] No    Would you like a call back once the refill request has been completed: [] Yes [x] No    If the office needs to give you a call back, can they leave a voicemail: [] Yes [] No    Tona Oseguera Rep   01/09/23 16:36 EST

## 2023-01-11 DIAGNOSIS — K21.9 GASTROESOPHAGEAL REFLUX DISEASE WITHOUT ESOPHAGITIS: ICD-10-CM

## 2023-01-11 RX ORDER — ATORVASTATIN CALCIUM 20 MG/1
20 TABLET, FILM COATED ORAL DAILY
Qty: 90 TABLET | Refills: 2 | Status: SHIPPED | OUTPATIENT
Start: 2023-01-11

## 2023-01-11 RX ORDER — OMEPRAZOLE 20 MG/1
20 CAPSULE, DELAYED RELEASE ORAL DAILY
Qty: 90 CAPSULE | Refills: 2 | Status: SHIPPED | OUTPATIENT
Start: 2023-01-11

## 2023-01-11 RX ORDER — METOPROLOL SUCCINATE 50 MG/1
50 TABLET, EXTENDED RELEASE ORAL DAILY
Qty: 90 TABLET | Refills: 0 | Status: SHIPPED | OUTPATIENT
Start: 2023-01-11 | End: 2023-02-08

## 2023-01-11 RX ORDER — AMLODIPINE BESYLATE 5 MG/1
5 TABLET ORAL DAILY
Qty: 90 TABLET | Refills: 0 | Status: SHIPPED | OUTPATIENT
Start: 2023-01-11 | End: 2023-02-13 | Stop reason: SDUPTHER

## 2023-01-11 RX ORDER — OMEPRAZOLE 20 MG/1
20 CAPSULE, DELAYED RELEASE ORAL DAILY
Qty: 30 CAPSULE | Refills: 0 | Status: SHIPPED | OUTPATIENT
Start: 2023-01-11 | End: 2023-01-11

## 2023-01-11 RX ORDER — LOSARTAN POTASSIUM 100 MG/1
100 TABLET ORAL DAILY
Qty: 90 TABLET | Refills: 2 | Status: SHIPPED | OUTPATIENT
Start: 2023-01-11

## 2023-01-22 NOTE — PROGRESS NOTES
Encounter Date:01/31/2023  Last seen 6/9/2022      Patient ID: Luis MONGE Elder is a 77 y.o. male.    Chief Complaint    Aortic valve stenosis  Status post TAVR  Bradycardia  Hypertension  Dyslipidemia  Dizziness     History of Present Illness  Since I have last seen, the patient has been without any chest discomfort ,shortness of breath, palpitations, dizziness or syncope.  Denies having any headache ,abdominal pain ,nausea, vomiting , diarrhea constipation, loss of weight or loss of appetite.  Denies having any excessive bruising ,hematuria or blood in the stool.    Review of all systems negative except as indicated.    Reviewed ROS.    Assessment and Plan      ]]]]]]]]]]]]]]]]]]  Impression  ==========  Status post TAVR 11/3/2021-  (29 mm Evolut Pro self-expanding transcatheter aortic valve [employer])     Echocardiogram 4/25/2022 revealed normal left ventricular function with ejection fraction of 60%..  TAVR valve appears to be intact.  No evidence for aortic regurgitation is present.     30-day event monitor 4/7/2022  Basic rhythm is sinus.  Rate varied between /mt  No ectopy was noted.  No AV blocks or pauses were noted.     History of significant aortic valve stenosis with gradient of 66 (peak to peak) and 31 mean gradient and valve area of 1.2 cm²-dizziness and near syncopal feeling.  Echocardiogram 9/27/2021 revealed  Normal left ventricle function significant aortic valve stenosis with valve area of 0.4-0.5 cm².  Gradient 50/35 mmHg.     Cardiac catheterization 10/1/2021  No evidence for pulmonary hypertension is present.  Left ventricle angiogram was not performed due to inability to cross the aortic valve.  Aortogram revealed normal-sized aorta with significantly reduced aortic valve motion.  No evidence for aortic regurgitation is present.  Left main coronary artery is normal.  Left anterior descending artery normal.  Circumflex coronary artery is normal.  Right coronary artery is normal  (large and dominant)             Transesophageal echocardiogram 10/1/2021 revealed     Significant aortic valve stenosis with valve area of 0.8 cm².  Left ventricle is normal in size and contractility.  Ejection fraction 60%.  Mild mitral and aortic regurgitation is present.  No pericardial effusion or intracardiac thrombus is seen        • -Sinus bradycardia  •    • -Hypertension and dyslipidemia  •    • -History of prostate cancer.  •    • -Status post cholecystectomy  •    • - Allergic to nitroglycerin  •    • -Non-smoker  • =============  • Plan  • =========  Dizziness- better  Occasional double vision-primary care.     Sinus bradycardia.  Stable-asymptomatic    30-day event monitor 4/7/2022  Basic rhythm is sinus.  Rate varied between /mt  No ectopy was noted.  No AV blocks or pauses were noted.     • Status post TAVR  • 11/3/2021  •    • Hypertension- .  • 139/73  • Continue losartan metoprolol.  • Continue Norvasc 5 mg a day  •    • Dyslipidemia-continue atorvastatin.  •    • Follow-up in the office in  6 months.     • Further plan will depend on patient's progress.  [[[[[[[[[[[[[[[                                   Diagnosis Plan   1. S/p TAVR (transcatheter aortic valve replacement), bioprosthetic        2. Aortic stenosis, severe        3. S/P TAVR (transcatheter aortic valve replacement)        4. Chronic diastolic congestive heart failure (HCC)        5. Mixed hyperlipidemia        6. LBBB (left bundle branch block)        7. Essential hypertension        8. Near syncope        9. Postoperative hematoma involving circulatory system following other circulatory system procedure        10. Groin hematoma, initial encounter        LAB RESULTS (LAST 7 DAYS)    CBC        BMP        CMP         BNP        TROPONIN        CoAg        Creatinine Clearance  CrCl cannot be calculated (Patient's most recent lab result is older than the maximum 30 days allowed.).    ABG        Radiology  No radiology results  for the last day                The following portions of the patient's history were reviewed and updated as appropriate: allergies, current medications, past family history, past medical history, past social history, past surgical history and problem list.    Review of Systems   Constitutional: Negative for malaise/fatigue.   Cardiovascular: Negative for chest pain, leg swelling, palpitations and syncope.   Respiratory: Negative for shortness of breath.    Skin: Negative for rash.   Gastrointestinal: Negative for nausea and vomiting.   Neurological: Negative for dizziness, light-headedness and numbness.   All other systems reviewed and are negative.        Current Outpatient Medications:   •  amLODIPine (NORVASC) 5 MG tablet, Take 1 tablet by mouth Daily., Disp: 90 tablet, Rfl: 0  •  aspirin 81 MG EC tablet, Take 81 mg by mouth Daily., Disp: , Rfl:   •  atorvastatin (LIPITOR) 20 MG tablet, Take 1 tablet by mouth Daily., Disp: 90 tablet, Rfl: 2  •  glucosamine-chondroitin 500-400 MG per tablet, Take 1 tablet by mouth Daily., Disp: , Rfl:   •  latanoprost (XALATAN) 0.005 % ophthalmic solution, , Disp: , Rfl:   •  Loratadine 10 MG capsule, Take 1 capsule by mouth Daily., Disp: , Rfl:   •  losartan (COZAAR) 100 MG tablet, Take 1 tablet by mouth Daily., Disp: 90 tablet, Rfl: 2  •  methylcellulose, Laxative, (CITRUCEL) 500 MG tablet tablet, Take 1 tablet by mouth 2 (two) times a day., Disp: , Rfl:   •  metoprolol succinate XL (TOPROL-XL) 50 MG 24 hr tablet, Take 1 tablet by mouth Daily., Disp: 90 tablet, Rfl: 0  •  Multiple Vitamin (MULTIVITAMINS PO), Take 1 capsule by mouth Daily., Disp: , Rfl:   •  Omega-3 Fatty Acids (FISH OIL CONCENTRATE) 300 MG capsule, Take 1 capsule by mouth Daily., Disp: , Rfl:   •  omeprazole (priLOSEC) 20 MG capsule, TAKE 1 CAPSULE BY MOUTH DAILY, Disp: 90 capsule, Rfl: 2  •  omeprazole (priLOSEC) 20 MG capsule, TAKE 1 CAPSULE BY MOUTH DAILY, Disp: 90 capsule, Rfl: 2  •  Polyvinyl  Alcohol-Povidone (REFRESH OP), Apply 1 drop to eye(s) as directed by provider 2 (Two) Times a Day., Disp: , Rfl:     Allergies   Allergen Reactions   • Nitroglycerin Unknown (See Comments)     chills       Family History   Problem Relation Age of Onset   • Testicular cancer Father    • Coronary artery disease Father    • Hypertension Father    • Hypertension Other    • Cancer Mother    • Hypertension Sister    • Malig Hyperthermia Neg Hx        Past Surgical History:   Procedure Laterality Date   • ANKLE TENDON REPAIR  July 5th, 2017    Dr. Armijo   • AORTIC VALVE REPAIR/REPLACEMENT N/A 11/02/2021    Procedure: TTE TRANSFEMORAL TRANSCATHETER AORTIC VALVE REPLACEMENT with IntraOp TTE and possible open surgical rescue.;  Surgeon: Preston Hager MD;  Location: [facility] OR 18/19;  Service: Cardiothoracic;  Laterality: N/A;   • AORTIC VALVE REPAIR/REPLACEMENT N/A 11/02/2021    Procedure: Transfemoral Transcatheter Aortic Valve Replacement with IntraOp TTE and possible open surgical rescue.;  Surgeon: Manpreet Vargas MD;  Location: [facility] OR 18/19;  Service: Cardiovascular;  Laterality: N/A;   • AORTIC VALVE SURGERY     • CARDIAC CATHETERIZATION  2005    no stents   • CARDIAC CATHETERIZATION N/A 10/01/2021    Procedure: Right and Left Heart Cath with Coronar Angiography;  Surgeon: Luis Angel Frederick MD;  Location: [facility] INVASIVE LOCATION;  Service: Cardiovascular;  Laterality: N/A;   • CARDIAC CATHETERIZATION N/A 10/01/2021    Procedure: Aortic root aortogram;  Surgeon: Luis Angel Frederick MD;  Location: [facility] INVASIVE LOCATION;  Service: Cardiovascular;  Laterality: N/A;   • CARDIAC VALVE REPLACEMENT  11/02/2021   • CHOLECYSTECTOMY  03/2010    [facility].   • PROSTATECTOMY  01/1997    [facility].       Past Medical History:   Diagnosis Date   • Abdominal pain     Impression: s/p recent cholecystecomy clinically improved   • Acute recurrent frontal sinusitis   "  • Acute upper respiratory infection    • Anemia     Impression: stable, Hgb 12.1   • Aortic valve replaced 11/2/2021   • Cancer (HCC)     prostate- removed surgically   • Cholelithiasis 03/29/2010    Removed   • Chronic angle-closure glaucoma    • DNR (do not resuscitate)    • Essential hypertension    • Gastroesophageal reflux disease without esophagitis     Impression: Stable on current meds (alternating omeprazole and ranitidine). EGD 1/2017.   • Heart murmur    • History of measles    • Hyperlipidemia    • Medicare annual wellness visit, subsequent    • Onychomycosis     Impression: Treatment options discussed. Prescription(s) as below. Medication(s) usage and side effects discussed.   • Overweight    • Personal history of malignant neoplasm of prostate    • Screening for depression     Negative Depression Screening (4 or less) ()   • Screening PSA (prostate specific antigen)     Impression: With history of prostate cancer. New urinary symptoms. Recheck PSA.   • Urinary frequency     Impression: Prescription(s) as below. Medication(s) usage and side effects discussed.       Family History   Problem Relation Age of Onset   • Testicular cancer Father    • Coronary artery disease Father    • Hypertension Father    • Hypertension Other    • Cancer Mother    • Hypertension Sister    • Malig Hyperthermia Neg Hx        Social History     Socioeconomic History   • Marital status:    • Number of children: 4   Tobacco Use   • Smoking status: Never   • Smokeless tobacco: Never   • Tobacco comments:     caffeine use   Vaping Use   • Vaping Use: Never used   Substance and Sexual Activity   • Alcohol use: No   • Drug use: Never   • Sexual activity: Defer         Procedures      Objective:       Physical Exam    /73 (BP Location: Right arm, Patient Position: Sitting, Cuff Size: Large Adult)   Pulse 62   Ht 170.2 cm (67\")   Wt 80.7 kg (178 lb)   SpO2 100%   BMI 27.88 kg/m²   The patient is alert, " oriented and in no distress.    Vital signs as noted above.    Head and neck revealed no carotid bruits or jugular venous distension.  No thyromegaly or lymphadenopathy is present.    Lungs clear.  No wheezing.  Breath sounds are normal bilaterally.    Heart normal first and second heart sounds.  No murmur..  No pericardial rub is present.  No gallop is present.    Abdomen soft and nontender.  No organomegaly is present.    Extremities revealed good peripheral pulses without any pedal edema.    Skin warm and dry.    Musculoskeletal system is grossly normal.    CNS grossly normal.    Reviewed and updated.

## 2023-01-31 ENCOUNTER — OFFICE VISIT (OUTPATIENT)
Dept: CARDIOLOGY | Facility: CLINIC | Age: 78
End: 2023-01-31
Payer: MEDICARE

## 2023-01-31 VITALS
DIASTOLIC BLOOD PRESSURE: 73 MMHG | HEIGHT: 67 IN | BODY MASS INDEX: 27.94 KG/M2 | SYSTOLIC BLOOD PRESSURE: 139 MMHG | HEART RATE: 62 BPM | WEIGHT: 178 LBS | OXYGEN SATURATION: 100 %

## 2023-01-31 DIAGNOSIS — I35.0 AORTIC STENOSIS, SEVERE: ICD-10-CM

## 2023-01-31 DIAGNOSIS — I44.7 LBBB (LEFT BUNDLE BRANCH BLOCK): ICD-10-CM

## 2023-01-31 DIAGNOSIS — I50.32 CHRONIC DIASTOLIC CONGESTIVE HEART FAILURE: ICD-10-CM

## 2023-01-31 DIAGNOSIS — E78.2 MIXED HYPERLIPIDEMIA: ICD-10-CM

## 2023-01-31 DIAGNOSIS — I97.638 POSTOPERATIVE HEMATOMA INVOLVING CIRCULATORY SYSTEM FOLLOWING OTHER CIRCULATORY SYSTEM PROCEDURE: ICD-10-CM

## 2023-01-31 DIAGNOSIS — R55 NEAR SYNCOPE: ICD-10-CM

## 2023-01-31 DIAGNOSIS — I10 ESSENTIAL HYPERTENSION: ICD-10-CM

## 2023-01-31 DIAGNOSIS — Z95.3 S/P TAVR (TRANSCATHETER AORTIC VALVE REPLACEMENT), BIOPROSTHETIC: Primary | ICD-10-CM

## 2023-01-31 DIAGNOSIS — Z95.2 S/P TAVR (TRANSCATHETER AORTIC VALVE REPLACEMENT): ICD-10-CM

## 2023-01-31 DIAGNOSIS — S30.1XXA GROIN HEMATOMA, INITIAL ENCOUNTER: ICD-10-CM

## 2023-01-31 PROCEDURE — 99214 OFFICE O/P EST MOD 30 MIN: CPT | Performed by: INTERNAL MEDICINE

## 2023-02-06 DIAGNOSIS — I10 ESSENTIAL HYPERTENSION: ICD-10-CM

## 2023-02-08 RX ORDER — METOPROLOL SUCCINATE 50 MG/1
50 TABLET, EXTENDED RELEASE ORAL DAILY
Qty: 90 TABLET | Refills: 0 | Status: SHIPPED | OUTPATIENT
Start: 2023-02-08

## 2023-02-12 DIAGNOSIS — I10 ESSENTIAL HYPERTENSION: ICD-10-CM

## 2023-02-13 RX ORDER — AMLODIPINE BESYLATE 5 MG/1
5 TABLET ORAL DAILY
Qty: 90 TABLET | Refills: 0 | Status: SHIPPED | OUTPATIENT
Start: 2023-02-13

## 2023-05-05 ENCOUNTER — OFFICE VISIT (OUTPATIENT)
Dept: FAMILY MEDICINE CLINIC | Facility: CLINIC | Age: 78
End: 2023-05-05
Payer: MEDICARE

## 2023-05-05 ENCOUNTER — PATIENT ROUNDING (BHMG ONLY) (OUTPATIENT)
Dept: FAMILY MEDICINE CLINIC | Facility: CLINIC | Age: 78
End: 2023-05-05
Payer: MEDICARE

## 2023-05-05 VITALS
RESPIRATION RATE: 16 BRPM | HEART RATE: 74 BPM | TEMPERATURE: 97.3 F | HEIGHT: 67 IN | OXYGEN SATURATION: 97 % | SYSTOLIC BLOOD PRESSURE: 120 MMHG | BODY MASS INDEX: 27.56 KG/M2 | DIASTOLIC BLOOD PRESSURE: 60 MMHG | WEIGHT: 175.6 LBS

## 2023-05-05 DIAGNOSIS — Z91.09 ENVIRONMENTAL ALLERGIES: Primary | ICD-10-CM

## 2023-05-05 DIAGNOSIS — I10 ESSENTIAL HYPERTENSION: ICD-10-CM

## 2023-05-05 DIAGNOSIS — E66.3 OVERWEIGHT WITH BODY MASS INDEX (BMI) OF 28 TO 28.9 IN ADULT: ICD-10-CM

## 2023-05-05 RX ORDER — MONTELUKAST SODIUM 10 MG/1
10 TABLET ORAL NIGHTLY
Qty: 30 TABLET | Refills: 1 | Status: SHIPPED | OUTPATIENT
Start: 2023-05-05

## 2023-05-05 RX ORDER — TRIAMCINOLONE ACETONIDE 1 MG/G
CREAM TOPICAL
COMMUNITY
Start: 2023-02-21

## 2023-05-05 RX ORDER — AMLODIPINE BESYLATE 5 MG/1
5 TABLET ORAL DAILY
Qty: 90 TABLET | Refills: 3 | Status: SHIPPED | OUTPATIENT
Start: 2023-05-05

## 2023-05-05 RX ORDER — PROMETHAZINE HYDROCHLORIDE 25 MG/1
25 TABLET ORAL
COMMUNITY
Start: 2023-03-30

## 2023-05-05 NOTE — PROGRESS NOTES
May 5, 2023    Hello, may I speak with Luis Emery?    My name is Brandi      I am  with [address]  [facility]  [address] [place] [address] [address]  [place] [address].    Before we get started may I verify your date of birth? 1945    I am calling to officially welcome you to our practice and ask about your recent visit. Is this a good time to talk? yes    Tell me about your visit with us. What things went well?  doctor and staff are very nice       We're always looking for ways to make our patients' experiences even better. Do you have recommendations on ways we may improve?  no    Overall were you satisfied with your first visit to our practice? yes       I appreciate you taking the time to speak with me today. Is there anything else I can do for you? no      Thank you, and have a great day.

## 2023-05-05 NOTE — PROGRESS NOTES
Subjective   Luis Emery is a 77 y.o. male.   Chief Complaint   Patient presents with   • Establish Care     Pt transferring from Dr. Liv Pina   • Cough       History of Present Illness         Productive Cough:  -Started 1 month ago. Productive cough worst in the am and pm. Slight improvement over the course of the month  - denies: rhinorrhea or sinus pressure  - has allergies, has been taking claritin. Wears a mask when cutting grass    Hypertension  -Patient requesting refill of amlodipine 5 mg daily  -Blood pressure in office today is 120/60    The following portions of the patient's history were reviewed and updated as appropriate: allergies, current medications, past family history, past medical history, past social history, past surgical history and problem list.    Patient Active Problem List   Diagnosis   • Personal history of prostate cancer   • Onychomycosis   • Increased frequency of urination   • Hyperlipidemia   • History of measles   • Gastroesophageal reflux disease without esophagitis   • Essential hypertension   • Medicare annual wellness visit, subsequent   • Chronic angle-closure glaucoma   • Anemia   • Heart murmur   • Aortic stenosis, severe   • Full code status   • Bradycardia   • S/P TAVR (transcatheter aortic valve replacement)   • Pacemaker   • CKD (chronic kidney disease) stage 2, GFR 60-89 ml/min   • Leg swelling   • Overweight with body mass index (BMI) of 28 to 28.9 in adult       Current Outpatient Medications on File Prior to Visit   Medication Sig Dispense Refill   • aspirin 81 MG EC tablet Take 1 tablet by mouth Daily.     • atorvastatin (LIPITOR) 20 MG tablet Take 1 tablet by mouth Daily. 90 tablet 2   • glucosamine-chondroitin 500-400 MG per tablet Take 1 tablet by mouth Daily.     • latanoprost (XALATAN) 0.005 % ophthalmic solution      • Loratadine 10 MG capsule Take 1 capsule by mouth Daily.     • losartan (COZAAR) 100 MG tablet Take 1 tablet by mouth Daily. 90 tablet 2  "  • methylcellulose, Laxative, (CITRUCEL) 500 MG tablet tablet Take 1 tablet by mouth 2 (two) times a day.     • metoprolol succinate XL (TOPROL-XL) 50 MG 24 hr tablet TAKE 1 TABLET BY MOUTH DAILY 90 tablet 0   • Multiple Vitamin (MULTIVITAMINS PO) Take 1 capsule by mouth Daily.     • Omega-3 Fatty Acids (FISH OIL CONCENTRATE) 300 MG capsule Take 1 capsule by mouth Daily.     • omeprazole (priLOSEC) 20 MG capsule TAKE 1 CAPSULE BY MOUTH DAILY 90 capsule 2   • Polyvinyl Alcohol-Povidone (REFRESH OP) Apply 1 drop to eye(s) as directed by provider 2 (Two) Times a Day.     • promethazine (PHENERGAN) 25 MG tablet Take 1 tablet by mouth.     • [DISCONTINUED] amLODIPine (NORVASC) 5 MG tablet TAKE 1 TABLET BY MOUTH DAILY 90 tablet 0   • triamcinolone (KENALOG) 0.1 % cream APPLY CREAM EXTERNALLY TWICE DAILY AS NEEDED TO AFFECTED AREA DO NOT APPLY TO FACE OR FOLDS     • [DISCONTINUED] omeprazole (priLOSEC) 20 MG capsule TAKE 1 CAPSULE BY MOUTH DAILY 90 capsule 2     No current facility-administered medications on file prior to visit.     Current outpatient and discharge medications have been reconciled for the patient.  Reviewed by: Yasmine Oconnor DO      Allergies   Allergen Reactions   • Nitroglycerin Unknown (See Comments)     chills         Objective   Visit Vitals  /60 (BP Location: Right arm, Patient Position: Sitting, Cuff Size: Adult)   Pulse 74   Temp 97.3 °F (36.3 °C) (Skin)   Resp 16   Ht 170.2 cm (67\")   Wt 79.7 kg (175 lb 9.6 oz)   SpO2 97%   BMI 27.50 kg/m²       Physical Exam  HENT:      Head: Normocephalic.      Nose:      Comments: - Nasal conchae erythematous and swollen  Eyes:      Conjunctiva/sclera: Conjunctivae normal.   Cardiovascular:      Rate and Rhythm: Normal rate and regular rhythm.      Heart sounds: Normal heart sounds.   Pulmonary:      Effort: Pulmonary effort is normal. No respiratory distress.      Breath sounds: Normal breath sounds. No wheezing, rhonchi or rales.   Neurological:     " " Mental Status: He is alert.           Diagnoses and all orders for this visit:    1. Environmental allergies (Primary)  -Told patient that this sounds like allergies rather than being sick and that continuing Claritin is a good idea  -Adding to Claritin: montelukast (SINGULAIR) 10 MG tablet; Take 1 tablet by mouth Every Night.  Dispense: 30 tablet; Refill: 1  -Told patient that if he started to get headaches or nasal congestion or \"foggy brain\", he may try daily Flonase as well  -Patient verbalized understanding    2. Essential hypertension  -Patient is controlled on current regimen.  Continue current regimen  -    amLODIPine (NORVASC) 5 MG tablet; Take 1 tablet by mouth Daily.  Dispense: 90 tablet; Refill: 3    3. Overweight with body mass index (BMI) of 28 to 28.9 in adult  Assessment & Plan:  Patient's (Body mass index is 27.5 kg/m².) indicates that they are overweight with health conditions that include hypertension and dyslipidemias . Weight is unchanged. BMI is is above average; BMI management plan is completed. We discussed portion control and increasing exercise.                Follow Up  -After 11/21/2023 for annual Medicare wellness    Expected course, medications, and adverse effects discussed as appropriate.  Call or return if worsening or persistent symptoms.  I wore protective equipment throughout this patient encounter to include mask and eye protection. Hand hygiene was performed before donning protective equipment and after removal when leaving the room.    This document is intended for medical expert use only. Reading of this document by patients and/or patient's family without participating medical staff guidance may result in misinterpretation and unintended morbidity. Any interpretation of such data is the responsibility of the patient and/or family member responsible for the patient in concert with their primary or specialist providers, not to be left for sources of online searches such as " [employer], Doctor.[employer] or similar queries. Relying on these approaches to knowledge may result in misinterpretation, misguided goals of care and even death should patients or family members try recommendations outside of the realm of professional medical care.

## 2023-05-05 NOTE — ASSESSMENT & PLAN NOTE
Patient's (Body mass index is 27.5 kg/m².) indicates that they are overweight with health conditions that include hypertension and dyslipidemias . Weight is unchanged. BMI is is above average; BMI management plan is completed. We discussed portion control and increasing exercise.

## 2023-05-08 DIAGNOSIS — I10 ESSENTIAL HYPERTENSION: ICD-10-CM

## 2023-05-09 RX ORDER — METOPROLOL SUCCINATE 50 MG/1
50 TABLET, EXTENDED RELEASE ORAL DAILY
Qty: 90 TABLET | Refills: 3 | Status: SHIPPED | OUTPATIENT
Start: 2023-05-09

## 2023-06-01 DIAGNOSIS — Z91.09 ENVIRONMENTAL ALLERGIES: ICD-10-CM

## 2023-06-01 RX ORDER — MONTELUKAST SODIUM 10 MG/1
10 TABLET ORAL NIGHTLY
Qty: 90 TABLET | Refills: 3 | Status: SHIPPED | OUTPATIENT
Start: 2023-06-01

## 2023-08-01 NOTE — PROGRESS NOTES
Encounter Date:08/14/2023  Last seen 1/31/2023      Patient ID: Luis MONGE Elder is a 78 y.o. male.    Chief Complaint:  Aortic valve stenosis  Status post TAVR  Bradycardia  Hypertension  Dyslipidemia  Dizziness     History of Present Illness  Since I have last seen, the patient has been without any chest discomfort ,shortness of breath, palpitations, dizziness or syncope.  Denies having any headache ,abdominal pain ,nausea, vomiting , diarrhea constipation, loss of weight or loss of appetite.  Denies having any excessive bruising ,hematuria or blood in the stool.    Review of all systems negative except as indicated.    Reviewed ROS.    Assessment and Plan      ]]]]]]]]]]]]]]]]]]  History  ==========  Status post TAVR 11/3/2021-  (29 mm Evolut Pro self-expanding transcatheter aortic valve [employer])    Echocardiogram 8/14/2023   Mild mitral regurgitation.  Mild tricuspid regurgitation.  Aortic (TAVR) valve is structurally and functionally normal.  Concentric left ventricle hypertrophy.  Left ventricular size and contractility is normal with ejection fraction of 60%.  Echocardiogram 4/25/2022 revealed normal left ventricular function with ejection fraction of 60%..  TAVR valve appears to be intact.  No evidence for aortic regurgitation is present.     30-day event monitor 4/7/2022  Basic rhythm is sinus.  Rate varied between /mt  No ectopy was noted.  No AV blocks or pauses were noted.     History of significant aortic valve stenosis with gradient of 66 (peak to peak) and 31 mean gradient and valve area of 1.2 cmý-dizziness and near syncopal feeling.  Echocardiogram 9/27/2021 revealed  Normal left ventricle function significant aortic valve stenosis with valve area of 0.4-0.5 cmý.  Gradient 50/35 mmHg.     Cardiac catheterization 10/1/2021  No evidence for pulmonary hypertension is present.  Left ventricle angiogram was not performed due to inability to cross the aortic valve.  Aortogram revealed  normal-sized aorta with significantly reduced aortic valve motion.  No evidence for aortic regurgitation is present.  Left main coronary artery is normal.  Left anterior descending artery normal.  Circumflex coronary artery is normal.  Right coronary artery is normal (large and dominant)             Transesophageal echocardiogram 10/1/2021 revealed     Significant aortic valve stenosis with valve area of 0.8 cmý.  Left ventricle is normal in size and contractility.  Ejection fraction 60%.  Mild mitral and aortic regurgitation is present.  No pericardial effusion or intracardiac thrombus is seen        -Sinus bradycardia    Left bundle branch block-EKG 8/14/2023     -Hypertension and dyslipidemia     -History of prostate cancer.     -Status post cholecystectomy     - Allergic to nitroglycerin     -Non-smoker  =============  Plan  =========  Status post TAVR-11/3/2021  Echocardiogram-8/14/2023-as above.  Patient was educated regarding the results.    Dizziness- better  Occasional double vision-primary care.    Hypertension-well-controlled  134/75     Sinus bradycardia.  Stable-asymptomatic   Continue losartan metoprolol.  Continue Norvasc 5 mg a day    Sinus rhythm left bundle branch block 60/min nonspecific ST-T wave changes normal axis no ectopy compared to 9/8/2022 left bundle branch block is new.-8/14/2023  Left branch block-new compared to 9/8/2022.  Patient was educated regarding the results.    Dyslipidemia-continue atorvastatin.     Follow-up in the office in  6 months.     Further plan will depend on patient's progress.    Reviewed and updated-8/14/2023  [[[[[[[[[[[[[[[             Diagnosis Plan   1. S/p TAVR (transcatheter aortic valve replacement), bioprosthetic        2. Mixed hyperlipidemia        3. Aortic stenosis, severe        4. LBBB (left bundle branch block)        5. S/P TAVR (transcatheter aortic valve replacement)        6. Essential hypertension        7. Chronic diastolic congestive heart  "failure        8. Near syncope        LAB RESULTS (LAST 7 DAYS)    CBC        BMP        CMP         BNP        TROPONIN        CoAg        Creatinine Clearance  CrCl cannot be calculated (Patient's most recent lab result is older than the maximum 30 days allowed.).    ABG        Radiology  No radiology results for the last day                The following portions of the patient's history were reviewed and updated as appropriate: allergies, current medications, past family history, past medical history, past social history, past surgical history, and problem list.    Review of Systems   Constitutional: Negative for malaise/fatigue.   Cardiovascular:  Positive for leg swelling (WEARING COMPRESSION SOCKS). Negative for chest pain, palpitations and syncope.   Respiratory:  Negative for shortness of breath.    Skin:  Negative for rash.   Gastrointestinal:  Negative for nausea and vomiting.   Neurological:  Positive for light-headedness (\"A FEW\" SPELLS). Negative for dizziness and numbness.   All other systems reviewed and are negative.      Current Outpatient Medications:     amLODIPine (NORVASC) 5 MG tablet, TAKE 1 TABLET BY MOUTH DAILY, Disp: 90 tablet, Rfl: 3    amoxicillin (AMOXIL) 500 MG tablet, TAKE 4 TABLETS BY MOUTH 1 HOUR PRIOR TO DENTAL APPOINTMENT, Disp: , Rfl:     aspirin 81 MG EC tablet, Take 1 tablet by mouth Daily., Disp: , Rfl:     atorvastatin (LIPITOR) 20 MG tablet, Take 1 tablet by mouth Daily., Disp: 90 tablet, Rfl: 2    glucosamine-chondroitin 500-400 MG per tablet, Take 1 tablet by mouth Daily., Disp: , Rfl:     latanoprost (XALATAN) 0.005 % ophthalmic solution, , Disp: , Rfl:     Loratadine 10 MG capsule, Take 1 capsule by mouth Daily., Disp: , Rfl:     losartan (COZAAR) 100 MG tablet, Take 1 tablet by mouth Daily., Disp: 90 tablet, Rfl: 2    methylcellulose, Laxative, (CITRUCEL) 500 MG tablet tablet, Take 1 tablet by mouth 2 (two) times a day., Disp: , Rfl:     metoprolol succinate XL " (TOPROL-XL) 50 MG 24 hr tablet, TAKE 1 TABLET BY MOUTH DAILY, Disp: 90 tablet, Rfl: 3    montelukast (SINGULAIR) 10 MG tablet, TAKE 1 TABLET BY MOUTH EVERY NIGHT, Disp: 90 tablet, Rfl: 3    Multiple Vitamin (MULTIVITAMINS PO), Take 1 capsule by mouth Daily., Disp: , Rfl:     Omega-3 Fatty Acids (FISH OIL CONCENTRATE) 300 MG capsule, Take 1 capsule by mouth Daily., Disp: , Rfl:     omeprazole (priLOSEC) 20 MG capsule, TAKE 1 CAPSULE BY MOUTH DAILY, Disp: 90 capsule, Rfl: 2    Polyvinyl Alcohol-Povidone (REFRESH OP), Apply 1 drop to eye(s) as directed by provider 2 (Two) Times a Day., Disp: , Rfl:     promethazine (PHENERGAN) 25 MG tablet, Take 1 tablet by mouth., Disp: , Rfl:     triamcinolone (KENALOG) 0.1 % cream, APPLY CREAM EXTERNALLY TWICE DAILY AS NEEDED TO AFFECTED AREA DO NOT APPLY TO FACE OR FOLDS, Disp: , Rfl:     Allergies   Allergen Reactions    Nitroglycerin Unknown (See Comments)     chills       Family History   Problem Relation Age of Onset    Testicular cancer Father     Coronary artery disease Father     Hypertension Father     Hypertension Other     Cancer Mother     Hypertension Sister     Hypertension Sister     Malig Hyperthermia Neg Hx        Past Surgical History:   Procedure Laterality Date    ANKLE TENDON REPAIR  July 5th, 2017    Dr. Armijo    AORTIC VALVE REPAIR/REPLACEMENT N/A 11/02/2021    Procedure: TTE TRANSFEMORAL TRANSCATHETER AORTIC VALVE REPLACEMENT with IntraOp TTE and possible open surgical rescue.;  Surgeon: Preston Hager MD;  Location: [facility] OR 18/19;  Service: Cardiothoracic;  Laterality: N/A;    AORTIC VALVE REPAIR/REPLACEMENT N/A 11/02/2021    Procedure: Transfemoral Transcatheter Aortic Valve Replacement with IntraOp TTE and possible open surgical rescue.;  Surgeon: Manpreet Vargas MD;  Location: [facility] OR 18/19;  Service: Cardiovascular;  Laterality: N/A;    AORTIC VALVE SURGERY      CARDIAC CATHETERIZATION  2005    no stents    CARDIAC  CATHETERIZATION N/A 10/01/2021    Procedure: Right and Left Heart Cath with Coronar Angiography;  Surgeon: Luis Angel Frederick MD;  Location: [facility] CATH INVASIVE LOCATION;  Service: Cardiovascular;  Laterality: N/A;    CARDIAC CATHETERIZATION N/A 10/01/2021    Procedure: Aortic root aortogram;  Surgeon: Luis Angel Frederick MD;  Location: [facility] CATH INVASIVE LOCATION;  Service: Cardiovascular;  Laterality: N/A;    CARDIAC VALVE REPLACEMENT  11/02/2021    CHOLECYSTECTOMY  03/2010    [facility].    PROSTATECTOMY  01/1997    [facility].       Past Medical History:   Diagnosis Date    Abdominal pain     Impression: s/p recent cholecystecomy clinically improved    Acute recurrent frontal sinusitis     Acute upper respiratory infection     Anemia     Impression: stable, Hgb 12.1    Aortic valve replaced 11/2/2021    Cancer     prostate- removed surgically    Cholelithiasis 03/29/2010    Removed    Chronic angle-closure glaucoma     DNR (do not resuscitate)     Essential hypertension     Gastroesophageal reflux disease without esophagitis     Impression: Stable on current meds (alternating omeprazole and ranitidine). EGD 1/2017.    Heart murmur     History of measles     Hyperlipidemia     Medicare annual wellness visit, subsequent     Onychomycosis     Impression: Treatment options discussed. Prescription(s) as below. Medication(s) usage and side effects discussed.    Overweight     Personal history of malignant neoplasm of prostate     Screening for depression     Negative Depression Screening (4 or less) ()    Screening PSA (prostate specific antigen)     Impression: With history of prostate cancer. New urinary symptoms. Recheck PSA.    Urinary frequency     Impression: Prescription(s) as below. Medication(s) usage and side effects discussed.       Family History   Problem Relation Age of Onset    Testicular cancer Father     Coronary artery disease Father     Hypertension Father      "Hypertension Other     Cancer Mother     Hypertension Sister     Hypertension Sister     Malig Hyperthermia Neg Hx        Social History     Socioeconomic History    Marital status:     Number of children: 4   Tobacco Use    Smoking status: Never     Passive exposure: Past    Smokeless tobacco: Never    Tobacco comments:     caffeine use   Vaping Use    Vaping Use: Never used   Substance and Sexual Activity    Alcohol use: No    Drug use: Never    Sexual activity: Not Currently     Partners: Female           ECG 12 Lead    Date/Time: 8/14/2023 10:42 AM  Performed by: Luis Angel Frederick MD  Authorized by: Luis Angel Frederick MD   Comparison: compared with previous ECG   Comparison to previous ECG: Sinus rhythm left bundle branch block 60/min nonspecific ST-T wave changes normal axis no ectopy compared to 9/8/2022 left bundle branch block is new.        Objective:       Physical Exam    /75 (BP Location: Left arm, Patient Position: Sitting, Cuff Size: Large Adult)   Pulse 60   Ht 170.2 cm (67\")   Wt 80.3 kg (177 lb)   SpO2 100% Comment: RA  BMI 27.72 kg/mý   The patient is alert, oriented and in no distress.    Vital signs as noted above.    Head and neck revealed no carotid bruits or jugular venous distension.  No thyromegaly or lymphadenopathy is present.    Lungs clear.  No wheezing.  Breath sounds are normal bilaterally.    Heart normal first and second heart sounds.  No murmur..  No pericardial rub is present.  No gallop is present.    Abdomen soft and nontender.  No organomegaly is present.    Extremities revealed good peripheral pulses without any pedal edema.    Skin warm and dry.    Musculoskeletal system is grossly normal.    CNS grossly normal.    Reviewed and updated.        "

## 2023-08-14 ENCOUNTER — HOSPITAL ENCOUNTER (OUTPATIENT)
Dept: CARDIOLOGY | Facility: HOSPITAL | Age: 78
Discharge: HOME | End: 2023-08-14
Admitting: INTERNAL MEDICINE
Payer: MEDICARE

## 2023-08-14 ENCOUNTER — OFFICE VISIT (OUTPATIENT)
Dept: CARDIOLOGY | Facility: CLINIC | Age: 78
End: 2023-08-14
Payer: MEDICARE

## 2023-08-14 VITALS
HEART RATE: 60 BPM | DIASTOLIC BLOOD PRESSURE: 75 MMHG | OXYGEN SATURATION: 100 % | SYSTOLIC BLOOD PRESSURE: 134 MMHG | HEIGHT: 67 IN | BODY MASS INDEX: 27.78 KG/M2 | WEIGHT: 177 LBS

## 2023-08-14 VITALS
HEIGHT: 67 IN | SYSTOLIC BLOOD PRESSURE: 154 MMHG | HEART RATE: 65 BPM | WEIGHT: 175 LBS | BODY MASS INDEX: 27.47 KG/M2 | DIASTOLIC BLOOD PRESSURE: 76 MMHG

## 2023-08-14 DIAGNOSIS — Z95.2 S/P TAVR (TRANSCATHETER AORTIC VALVE REPLACEMENT): ICD-10-CM

## 2023-08-14 DIAGNOSIS — R55 NEAR SYNCOPE: ICD-10-CM

## 2023-08-14 DIAGNOSIS — I10 ESSENTIAL HYPERTENSION: ICD-10-CM

## 2023-08-14 DIAGNOSIS — I35.0 AORTIC STENOSIS, SEVERE: ICD-10-CM

## 2023-08-14 DIAGNOSIS — I50.32 CHRONIC DIASTOLIC CONGESTIVE HEART FAILURE: ICD-10-CM

## 2023-08-14 DIAGNOSIS — E78.2 MIXED HYPERLIPIDEMIA: ICD-10-CM

## 2023-08-14 DIAGNOSIS — I44.7 LBBB (LEFT BUNDLE BRANCH BLOCK): ICD-10-CM

## 2023-08-14 DIAGNOSIS — Z95.3 S/P TAVR (TRANSCATHETER AORTIC VALVE REPLACEMENT), BIOPROSTHETIC: ICD-10-CM

## 2023-08-14 DIAGNOSIS — Z95.3 S/P TAVR (TRANSCATHETER AORTIC VALVE REPLACEMENT), BIOPROSTHETIC: Primary | ICD-10-CM

## 2023-08-14 LAB
BH CV ECHO MEAS - AO MAX PG: 10 MMHG
BH CV ECHO MEAS - AO MEAN PG: 4.8 MMHG
BH CV ECHO MEAS - AO ROOT DIAM: 3.4 CM
BH CV ECHO MEAS - AO V2 MAX: 158.2 CM/SEC
BH CV ECHO MEAS - AO V2 VTI: 33.9 CM
BH CV ECHO MEAS - AVA(I,D): 1.55 CM2
BH CV ECHO MEAS - EDV(CUBED): 130.1 ML
BH CV ECHO MEAS - EDV(MOD-SP4): 72.4 ML
BH CV ECHO MEAS - EF(MOD-BP): 60 %
BH CV ECHO MEAS - EF(MOD-SP4): 60.4 %
BH CV ECHO MEAS - ESV(CUBED): 37.9 ML
BH CV ECHO MEAS - ESV(MOD-SP4): 28.6 ML
BH CV ECHO MEAS - FS: 33.7 %
BH CV ECHO MEAS - IVS/LVPW: 1.06 CM
BH CV ECHO MEAS - IVSD: 1.15 CM
BH CV ECHO MEAS - LA DIMENSION: 4 CM
BH CV ECHO MEAS - LV MASS(C)D: 216.8 GRAMS
BH CV ECHO MEAS - LV MAX PG: 3.3 MMHG
BH CV ECHO MEAS - LV MEAN PG: 1.75 MMHG
BH CV ECHO MEAS - LV V1 MAX: 91.1 CM/SEC
BH CV ECHO MEAS - LV V1 VTI: 18.7 CM
BH CV ECHO MEAS - LVIDD: 5.1 CM
BH CV ECHO MEAS - LVIDS: 3.4 CM
BH CV ECHO MEAS - LVOT AREA: 2.8 CM2
BH CV ECHO MEAS - LVOT DIAM: 1.89 CM
BH CV ECHO MEAS - LVPWD: 1.08 CM
BH CV ECHO MEAS - MR MAX PG: 85.8 MMHG
BH CV ECHO MEAS - MR MAX VEL: 463 CM/SEC
BH CV ECHO MEAS - MV A MAX VEL: 105.7 CM/SEC
BH CV ECHO MEAS - MV DEC SLOPE: 268.4 CM/SEC2
BH CV ECHO MEAS - MV DEC TIME: 0.29 MSEC
BH CV ECHO MEAS - MV E MAX VEL: 77.2 CM/SEC
BH CV ECHO MEAS - MV E/A: 0.73
BH CV ECHO MEAS - MV MAX PG: 4.7 MMHG
BH CV ECHO MEAS - MV MEAN PG: 1.79 MMHG
BH CV ECHO MEAS - MV V2 VTI: 38.8 CM
BH CV ECHO MEAS - MVA(VTI): 1.36 CM2
BH CV ECHO MEAS - PA V2 MAX: 108.1 CM/SEC
BH CV ECHO MEAS - PULM A REVS DUR: 0.12 SEC
BH CV ECHO MEAS - PULM A REVS VEL: 26.1 CM/SEC
BH CV ECHO MEAS - PULM DIAS VEL: 48.7 CM/SEC
BH CV ECHO MEAS - PULM S/D: 1.22
BH CV ECHO MEAS - PULM SYS VEL: 59.2 CM/SEC
BH CV ECHO MEAS - RAP SYSTOLE: 8 MMHG
BH CV ECHO MEAS - RV MAX PG: 1.68 MMHG
BH CV ECHO MEAS - RV V1 MAX: 64.8 CM/SEC
BH CV ECHO MEAS - RV V1 VTI: 12.3 CM
BH CV ECHO MEAS - RVSP: 24.4 MMHG
BH CV ECHO MEAS - SV(LVOT): 52.6 ML
BH CV ECHO MEAS - SV(MOD-SP4): 43.7 ML
BH CV ECHO MEAS - TR MAX PG: 16.4 MMHG
BH CV ECHO MEAS - TR MAX VEL: 202.2 CM/SEC

## 2023-08-14 PROCEDURE — 3075F SYST BP GE 130 - 139MM HG: CPT | Performed by: INTERNAL MEDICINE

## 2023-08-14 PROCEDURE — 99214 OFFICE O/P EST MOD 30 MIN: CPT | Performed by: INTERNAL MEDICINE

## 2023-08-14 PROCEDURE — 3078F DIAST BP <80 MM HG: CPT | Performed by: INTERNAL MEDICINE

## 2023-08-14 PROCEDURE — 1159F MED LIST DOCD IN RCRD: CPT | Performed by: INTERNAL MEDICINE

## 2023-08-14 PROCEDURE — 93306 TTE W/DOPPLER COMPLETE: CPT | Performed by: INTERNAL MEDICINE

## 2023-08-14 PROCEDURE — 93000 ELECTROCARDIOGRAM COMPLETE: CPT | Performed by: INTERNAL MEDICINE

## 2023-08-14 PROCEDURE — 93306 TTE W/DOPPLER COMPLETE: CPT

## 2023-08-14 PROCEDURE — 1160F RVW MEDS BY RX/DR IN RCRD: CPT | Performed by: INTERNAL MEDICINE

## 2023-08-14 RX ORDER — AMOXICILLIN 500 MG/1
TABLET, FILM COATED ORAL
COMMUNITY
Start: 2023-07-27

## 2023-10-05 DIAGNOSIS — I10 ESSENTIAL HYPERTENSION: ICD-10-CM

## 2023-10-05 DIAGNOSIS — K21.9 GASTROESOPHAGEAL REFLUX DISEASE WITHOUT ESOPHAGITIS: ICD-10-CM

## 2023-10-05 DIAGNOSIS — E78.2 MIXED HYPERLIPIDEMIA: ICD-10-CM

## 2023-10-06 ENCOUNTER — TELEPHONE (OUTPATIENT)
Dept: FAMILY MEDICINE CLINIC | Facility: CLINIC | Age: 78
End: 2023-10-06
Payer: MEDICARE

## 2023-10-06 RX ORDER — LOSARTAN POTASSIUM 100 MG/1
100 TABLET ORAL DAILY
Qty: 90 TABLET | Refills: 0 | Status: SHIPPED | OUTPATIENT
Start: 2023-10-06

## 2023-10-06 RX ORDER — OMEPRAZOLE 20 MG/1
20 CAPSULE, DELAYED RELEASE ORAL DAILY
Qty: 90 CAPSULE | Refills: 0 | Status: SHIPPED | OUTPATIENT
Start: 2023-10-06

## 2023-10-06 RX ORDER — ATORVASTATIN CALCIUM 20 MG/1
20 TABLET, FILM COATED ORAL DAILY
Qty: 90 TABLET | Refills: 0 | Status: SHIPPED | OUTPATIENT
Start: 2023-10-06

## 2023-10-06 NOTE — TELEPHONE ENCOUNTER
Caller: Luis Emery    Relationship: Self    Best call back number: [phone]    What medication are you requesting:     atorvastatin (LIPITOR) 20 MG tablet       omeprazole (priLOSEC) 20 MG capsule       losartan (COZAAR) 100 MG tablet       Have you had these symptoms before:    [x] Yes  [] No    Have you been treated for these symptoms before:   [x] Yes  [] No    If a prescription is needed, what is your preferred pharmacy and phone number: [id number] [address] AT [facility] OF  &  - [phone]  - [phone] FX     Additional notes:    THESE WERE LAST PRESCRIBED BY DOCTOR KALEIGH

## 2023-11-04 DIAGNOSIS — I10 ESSENTIAL HYPERTENSION: ICD-10-CM

## 2023-11-06 ENCOUNTER — TELEPHONE (OUTPATIENT)
Dept: FAMILY MEDICINE CLINIC | Facility: CLINIC | Age: 78
End: 2023-11-06

## 2023-11-06 RX ORDER — METOPROLOL SUCCINATE 50 MG/1
50 TABLET, EXTENDED RELEASE ORAL DAILY
Qty: 90 TABLET | Refills: 3 | Status: SHIPPED | OUTPATIENT
Start: 2023-11-06

## 2023-11-06 NOTE — TELEPHONE ENCOUNTER
Mr enoc came in today asking if we could fill his metoprolol he said he ran out and the last time only got a 30 day refill was hoping for a full 90 day

## 2023-11-27 ENCOUNTER — OFFICE VISIT (OUTPATIENT)
Dept: FAMILY MEDICINE CLINIC | Facility: CLINIC | Age: 78
End: 2023-11-27
Payer: MEDICARE

## 2023-11-27 ENCOUNTER — TELEPHONE (OUTPATIENT)
Dept: FAMILY MEDICINE CLINIC | Facility: CLINIC | Age: 78
End: 2023-11-27

## 2023-11-27 VITALS
OXYGEN SATURATION: 97 % | SYSTOLIC BLOOD PRESSURE: 110 MMHG | WEIGHT: 177.8 LBS | HEART RATE: 82 BPM | DIASTOLIC BLOOD PRESSURE: 60 MMHG | RESPIRATION RATE: 16 BRPM | BODY MASS INDEX: 27.91 KG/M2 | TEMPERATURE: 97.5 F | HEIGHT: 67 IN

## 2023-11-27 DIAGNOSIS — N63.0 BREAST MASS IN MALE: ICD-10-CM

## 2023-11-27 DIAGNOSIS — T88.7XXA DRUG SIDE EFFECTS: ICD-10-CM

## 2023-11-27 DIAGNOSIS — Z85.46 HISTORY OF PROSTATE CANCER: Primary | ICD-10-CM

## 2023-11-27 DIAGNOSIS — Z00.00 MEDICARE ANNUAL WELLNESS VISIT, SUBSEQUENT: Primary | ICD-10-CM

## 2023-11-27 DIAGNOSIS — E66.3 OVERWEIGHT WITH BODY MASS INDEX (BMI) OF 28 TO 28.9 IN ADULT: ICD-10-CM

## 2023-11-27 DIAGNOSIS — E78.2 MIXED HYPERLIPIDEMIA: ICD-10-CM

## 2023-11-27 DIAGNOSIS — N63.42 UNSPECIFIED LUMP IN LEFT BREAST, SUBAREOLAR: ICD-10-CM

## 2023-11-27 DIAGNOSIS — M25.562 ACUTE PAIN OF LEFT KNEE: ICD-10-CM

## 2023-11-27 DIAGNOSIS — Z91.09 ENVIRONMENTAL ALLERGIES: ICD-10-CM

## 2023-11-27 RX ORDER — DOXYCYCLINE 100 MG/1
1 TABLET ORAL EVERY 12 HOURS SCHEDULED
COMMUNITY
Start: 2023-11-18

## 2023-11-27 RX ORDER — ALBUTEROL SULFATE 90 UG/1
2 AEROSOL, METERED RESPIRATORY (INHALATION) EVERY 4 HOURS PRN
COMMUNITY
Start: 2023-11-18

## 2023-11-27 RX ORDER — BENZONATATE 100 MG/1
1 CAPSULE ORAL 3 TIMES DAILY
COMMUNITY
Start: 2023-11-18

## 2023-11-27 NOTE — TELEPHONE ENCOUNTER
Spoke to pt 11/27/2023, 3:51 pm advised pt per Dr. Oconnor a mammogram and US has been ordered (to be done at [facility])    Offered referral to urology, pt agrees, please order referral

## 2023-11-27 NOTE — PROGRESS NOTES
Persistent Cough  -Luis was seen at [facility] on 11/18/2023.  Patient had been mowing and working outside without a mask on then later developed hacking cough, fatigue,  rhinorrhea and nasal congestion that lasted for 5 days  -Patient was given course of doxycycline, benzonatate and albuterol inhaler  -Patient states symptoms have nearly resolved but he is still having a little bit of a cough  -States Mucinex is very helpful  -Stopped taking allergy pills in the last 4 days  -Asked if there is anything else that he needs to be prescribed considering he is about done with his doxycycline and benzonatate (did not get much benefit from benzonatate, stopped using albuterol inhaler on his own)      Left Knee popping:  -Started 2 months ago, denies pain  -Asked a friend who works in physical therapy who recommended ice, heat and a brace.  Patient has tried all this without any improvement  -Patient has been doing a lot of rowing on the rowing machine at the gym (though he has been doing rowing for a few years)      Left nipple pain:  - started 1-2 months ago  -Noticed his left nipple was very tender while toweling off after bathing  -Denies any discharge, rash or skin discoloration around the nipple

## 2023-11-27 NOTE — PROGRESS NOTES
The ABCs of the Annual Wellness Visit  Subsequent Medicare Wellness Visit    Subjective      Luis Emery is a 78 y.o. male who presents for a Subsequent Medicare Wellness Visit.    The following portions of the patient's history were reviewed and   updated as appropriate: allergies, current medications, past family history, past medical history, past social history, past surgical history, and problem list.    Compared to one year ago, the patient feels his physical   health is the same.    Compared to one year ago, the patient feels his mental   health is the same.    Recent Hospitalizations:  He was admitted within the past 365 days at [facility] 03/2023.       Current Medical Providers:  Patient Care Team:  Yasmine Oconnor DO as PCP - General (Family Medicine)  Luis Angel Frederick MD as Consulting Physician (Cardiology)  Luz Bernstein MD (Dermatology)    Outpatient Medications Prior to Visit   Medication Sig Dispense Refill    albuterol sulfate  (90 Base) MCG/ACT inhaler 2 puffs Every 4 (Four) Hours As Needed for Wheezing.      amLODIPine (NORVASC) 5 MG tablet TAKE 1 TABLET BY MOUTH DAILY 90 tablet 3    amoxicillin (AMOXIL) 500 MG tablet TAKE 4 TABLETS BY MOUTH 1 HOUR PRIOR TO DENTAL APPOINTMENT      aspirin 81 MG EC tablet Take 1 tablet by mouth Daily.      atorvastatin (LIPITOR) 20 MG tablet TAKE 1 TABLET BY MOUTH DAILY 90 tablet 0    benzonatate (TESSALON) 100 MG capsule Take 1 capsule by mouth 3 times a day.      doxycycline (ADOXA) 100 MG tablet Take 1 tablet by mouth Every 12 (Twelve) Hours.      glucosamine-chondroitin 500-400 MG per tablet Take 1 tablet by mouth Daily.      latanoprost (XALATAN) 0.005 % ophthalmic solution       losartan (COZAAR) 100 MG tablet TAKE 1 TABLET BY MOUTH DAILY 90 tablet 0    methylcellulose, Laxative, (CITRUCEL) 500 MG tablet tablet Take 1 tablet by mouth 2 (two) times a day.      metoprolol succinate XL (TOPROL-XL) 50 MG 24 hr tablet TAKE 1 TABLET BY MOUTH DAILY  90 tablet 3    Multiple Vitamin (MULTIVITAMINS PO) Take 1 capsule by mouth Daily.      Omega-3 Fatty Acids (FISH OIL CONCENTRATE) 300 MG capsule Take 1 capsule by mouth Daily.      omeprazole (priLOSEC) 20 MG capsule TAKE 1 CAPSULE BY MOUTH DAILY 90 capsule 0    Polyvinyl Alcohol-Povidone (REFRESH OP) Apply 1 drop to eye(s) as directed by provider 2 (Two) Times a Day.      promethazine (PHENERGAN) 25 MG tablet Take 1 tablet by mouth.      triamcinolone (KENALOG) 0.1 % cream APPLY CREAM EXTERNALLY TWICE DAILY AS NEEDED TO AFFECTED AREA DO NOT APPLY TO FACE OR FOLDS      Loratadine 10 MG capsule Take 1 capsule by mouth Daily. (Patient not taking: Reported on 11/27/2023)      montelukast (SINGULAIR) 10 MG tablet TAKE 1 TABLET BY MOUTH EVERY NIGHT (Patient not taking: Reported on 11/27/2023) 90 tablet 3     No facility-administered medications prior to visit.       No opioid medication identified on active medication list. I have reviewed chart for other potential  high risk medication/s and harmful drug interactions in the elderly.        Aspirin is on active medication list. Aspirin use is not indicated based on review of current medical condition/s. Risk of harm outweighs potential benefits. Patient instructed to discontinue this medication.  .      Patient Active Problem List   Diagnosis    Personal history of prostate cancer    Onychomycosis    Increased frequency of urination    Hyperlipidemia    History of measles    Gastroesophageal reflux disease without esophagitis    Essential hypertension    Chronic angle-closure glaucoma    Anemia    Aortic stenosis, severe    Full code status    Bradycardia    S/P TAVR (transcatheter aortic valve replacement)    Pacemaker    CKD (chronic kidney disease) stage 2, GFR 60-89 ml/min    Leg swelling    Overweight with body mass index (BMI) of 28 to 28.9 in adult     Advance Care Planning   Advance Care Planning     Advance Directive is not on file.  ACP discussion was held with  "the patient during this visit. Patient does not have an advance directive, information provided.  Spent 16 minutes in discussion with patient on ACP paperwork     Objective    Vitals:    23 1011   BP: 110/60   BP Location: Right arm   Patient Position: Sitting   Cuff Size: Adult   Pulse: 82   Resp: 16   Temp: 97.5 °F (36.4 °C)   TempSrc: Skin   SpO2: 97%   Weight: 80.6 kg (177 lb 12.8 oz)   Height: 170.2 cm (67\")     Estimated body mass index is 27.85 kg/m² as calculated from the following:    Height as of this encounter: 170.2 cm (67\").    Weight as of this encounter: 80.6 kg (177 lb 12.8 oz).           Does the patient have evidence of cognitive impairment?   No  - ACE III minico/30          HEALTH RISK ASSESSMENT    Smoking Status:  Social History     Tobacco Use   Smoking Status Never    Passive exposure: Past   Smokeless Tobacco Never   Tobacco Comments    caffeine use     Alcohol Consumption:  Social History     Substance and Sexual Activity   Alcohol Use No     Fall Risk Screen:    STEADI Fall Risk Assessment was completed, and patient is at LOW risk for falls.Assessment completed on:2023    Depression Screenin/27/2023    10:00 AM   PHQ-2/PHQ-9 Depression Screening   Little Interest or Pleasure in Doing Things 0-->not at all   Feeling Down, Depressed or Hopeless 0-->not at all   Trouble Falling or Staying Asleep, or Sleeping Too Much 0-->not at all   Feeling Tired or Having Little Energy 0-->not at all   Poor Appetite or Overeating 0-->not at all   Feeling Bad about Yourself - or that You are a Failure or Have Let Yourself or Your Family Down 0-->not at all   Trouble Concentrating on Things, Such as Reading the Newspaper or Watching Television 0-->not at all   Moving or Speaking So Slowly that Other People Could Have Noticed? Or the Opposite - Being So Fidgety 0-->not at all   Thoughts that You Would be Better Off Dead or of Hurting Yourself in Some Way 0-->not at all   PHQ-9: Brief " Depression Severity Measure Score 0   If You Checked Off Any Problems, How Difficult Have These Problems Made It For You to Do Your Work, Take Care of Things at Home, or Get Along with Other People? not difficult at all       Health Habits and Functional and Cognitive Screenin/27/2023    10:00 AM   Functional & Cognitive Status   Do you have difficulty preparing food and eating? No   Do you have difficulty bathing yourself, getting dressed or grooming yourself? No   Do you have difficulty using the toilet? No   Do you have difficulty moving around from place to place? No   Do you have trouble with steps or getting out of a bed or a chair? No   Current Diet Well Balanced Diet   Dental Exam Up to date   Eye Exam Up to date   Exercise (times per week) 2 times per week   Current Exercises Include Light Weights;Other;Walking   Do you need help using the phone?  No   Are you deaf or do you have serious difficulty hearing?  Yes   Do you need help to go to places out of walking distance? No   Do you need help shopping? No   Do you need help preparing meals?  No   Do you need help with housework?  No   Do you need help with laundry? No   Do you need help taking your medications? No   Do you need help managing money? No   Do you ever drive or ride in a car without wearing a seat belt? No   Have you felt unusual stress, anger or loneliness in the last month? No   Who do you live with? Spouse   If you need help, do you have trouble finding someone available to you? No   Have you been bothered in the last four weeks by sexual problems? No   Do you have difficulty concentrating, remembering or making decisions? No       Age-appropriate Screening Schedule:  Refer to the list below for future screening recommendations based on patient's age, sex and/or medical conditions. Orders for these recommended tests are listed in the plan section. The patient has been provided with a written plan.    Health Maintenance   Topic Date  Due    LIPID PANEL  05/23/2023    COVID-19 Vaccine (6 - 2023-24 season) 09/01/2023    ANNUAL WELLNESS VISIT  11/27/2024    BMI FOLLOWUP  11/27/2024    TDAP/TD VACCINES (3 - Td or Tdap) 04/18/2028    INFLUENZA VACCINE  Completed    Pneumococcal Vaccine 65+  Completed    ZOSTER VACCINE  Completed                Physical Exam  HENT:      Head: Normocephalic.    Eyes:      Conjunctiva/sclera: Conjunctivae normal.    Skin:     Comments: -Firm mobile mass about 1.5 cm in diameter, tender to palpation.  Located subareolar and slightly upper outer quadrant   Neurological:      Mental Status: He is alert.         CMS Preventative Services Quick Reference  Risk Factors Identified During Encounter:    None Identified    The above risks/problems have been discussed with the patient.  Pertinent information has been shared with the patient in the After Visit Summary.    Diagnoses and all orders for this visit:    1. Medicare annual wellness visit, subsequent (Primary)  -Patient took ACP paperwork home to fill out.  He will drop it off at the  once completed  -Pertinent screening labs ordered per below  -Patient has a personal history of prostate cancer and his father had testicular cancer.  Mother had cancer but he does not know what type as it is too far spread by the time it was found.  Patient states he is never talked with hematology oncologist to discuss his risk  -Patient states that he was evaluated and followed by urology for a while and then released.  He states this was a urologist in [place] and does not remember the exact provider  -For patient's history of excessive sun exposure, he sees a dermatologist every year for skin checks  -Patient asked if we would recommend RSV vaccination.  Told patient that this is now considered a recommended vaccination for those within his age range and if insurance covers it, would recommend getting it    -Told patient I would have him called for the above history.  Do feel  it may be best for patient least to check in with urology considering his risk and history of prostate cancer    Environmental allergies  -Recommended restarting allergy medication including Flonase as his symptoms sound allergic.  If no improvement after few weeks of this combination, to come back and we can talk about possibly starting Singulair    2. Acute pain of left knee  -Discussed the patient may be a good idea to go ahead and get some imaging on his left knee to make sure that he does not have developing arthritis and establish baseline.  Agree with patient that I think that the issue that he is having is more muscular or tendon related and physical therapy would be a great place for him to start for an evaluation and also working on rebalancing this muscles.  Patient is agreeable  -  Ambulatory Referral to Physical Therapy Evaluate and treat: Order printed, signed and handed to patient to take to facility of choice  -     XR Knee 3 View Left    3. Unspecified lump in left breast, subareolar/breast mass in male  -Firm, mobile mass was tender to palpation during physical exam.  No rash, discharge or fluctuance noted  -Per above, patient does have a history of prostate cancer  - Mammo Diagnostic Digital Tomosynthesis Left With CAD; Future  -     US Breast Left Limited; Future    5. Drug side effects  -     CBC & Differential  -     Comprehensive metabolic panel  -     Vitamin B12    6. Mixed hyperlipidemia  -     Lipid panel      8. Overweight with body mass index (BMI) of 28 to 28.9 in adult  -Information on standardize exercise recommendations and calorie counting added to patient's after visit summary        Follow Up:   Next Medicare Wellness visit to be scheduled in 1 year.      An After Visit Summary and PPPS were made available to the patient.

## 2023-11-27 NOTE — TELEPHONE ENCOUNTER
Told patient I will get back with him on this issue.  I did order mammogram and ultrasound of the area in question on his chest.    In addition, I do think it may be best for him just to touch base with a urologist considering his history to make sure there is nothing else that needs to be checked or monitored.  Would also like patient to tell the urologist that his father had testicular cancer.  Let me know if he is agreeable and I can place the referral

## 2023-11-27 NOTE — TELEPHONE ENCOUNTER
Placing referral for urology due to patient's history of prostate cancer and father's history of testicular cancer

## 2023-11-28 ENCOUNTER — HOSPITAL ENCOUNTER (OUTPATIENT)
Dept: GENERAL RADIOLOGY | Facility: HOSPITAL | Age: 78
Discharge: HOME | End: 2023-11-28
Admitting: STUDENT IN AN ORGANIZED HEALTH CARE EDUCATION/TRAINING PROGRAM
Payer: MEDICARE

## 2023-11-28 PROCEDURE — 73562 X-RAY EXAM OF KNEE 3: CPT

## 2023-12-01 LAB
ALBUMIN SERPL-MCNC: 4.1 G/DL (ref 3.8–4.8)
ALBUMIN/GLOB SERPL: 1.6 {RATIO} (ref 1.2–2.2)
ALP SERPL-CCNC: 70 IU/L (ref 44–121)
ALT SERPL-CCNC: 15 IU/L (ref 0–44)
AST SERPL-CCNC: 19 IU/L (ref 0–40)
BASOPHILS # BLD AUTO: 0.1 X10E3/UL (ref 0–0.2)
BASOPHILS NFR BLD AUTO: 1 %
BILIRUB SERPL-MCNC: 1.2 MG/DL (ref 0–1.2)
BUN SERPL-MCNC: 15 MG/DL (ref 8–27)
BUN/CREAT SERPL: 12 (ref 10–24)
CALCIUM SERPL-MCNC: 9.5 MG/DL (ref 8.6–10.2)
CHLORIDE SERPL-SCNC: 105 MMOL/L (ref 96–106)
CHOLEST SERPL-MCNC: 131 MG/DL (ref 100–199)
CO2 SERPL-SCNC: 22 MMOL/L (ref 20–29)
CREAT SERPL-MCNC: 1.22 MG/DL (ref 0.76–1.27)
EGFRCR SERPLBLD CKD-EPI 2021: 60 ML/MIN/1.73
EOSINOPHIL # BLD AUTO: 0.4 X10E3/UL (ref 0–0.4)
EOSINOPHIL NFR BLD AUTO: 6 %
ERYTHROCYTE [DISTWIDTH] IN BLOOD BY AUTOMATED COUNT: 12.7 % (ref 11.6–15.4)
GLOBULIN SER CALC-MCNC: 2.5 G/DL (ref 1.5–4.5)
GLUCOSE SERPL-MCNC: 102 MG/DL (ref 70–99)
HCT VFR BLD AUTO: 41.4 % (ref 37.5–51)
HDLC SERPL-MCNC: 43 MG/DL
HGB BLD-MCNC: 13.9 G/DL (ref 13–17.7)
IMM GRANULOCYTES # BLD AUTO: 0 X10E3/UL (ref 0–0.1)
IMM GRANULOCYTES NFR BLD AUTO: 0 %
LDLC SERPL CALC-MCNC: 68 MG/DL (ref 0–99)
LYMPHOCYTES # BLD AUTO: 1.7 X10E3/UL (ref 0.7–3.1)
LYMPHOCYTES NFR BLD AUTO: 29 %
MCH RBC QN AUTO: 31.2 PG (ref 26.6–33)
MCHC RBC AUTO-ENTMCNC: 33.6 G/DL (ref 31.5–35.7)
MCV RBC AUTO: 93 FL (ref 79–97)
MONOCYTES # BLD AUTO: 0.7 X10E3/UL (ref 0.1–0.9)
MONOCYTES NFR BLD AUTO: 11 %
NEUTROPHILS # BLD AUTO: 3.2 X10E3/UL (ref 1.4–7)
NEUTROPHILS NFR BLD AUTO: 53 %
PLATELET # BLD AUTO: 213 X10E3/UL (ref 150–450)
POTASSIUM SERPL-SCNC: 4.2 MMOL/L (ref 3.5–5.2)
PROT SERPL-MCNC: 6.6 G/DL (ref 6–8.5)
RBC # BLD AUTO: 4.45 X10E6/UL (ref 4.14–5.8)
SODIUM SERPL-SCNC: 140 MMOL/L (ref 134–144)
TRIGL SERPL-MCNC: 112 MG/DL (ref 0–149)
VIT B12 SERPL-MCNC: 410 PG/ML (ref 232–1245)
VLDLC SERPL CALC-MCNC: 20 MG/DL (ref 5–40)
WBC # BLD AUTO: 6 X10E3/UL (ref 3.4–10.8)

## 2023-12-06 ENCOUNTER — HOSPITAL ENCOUNTER (OUTPATIENT)
Dept: MAMMOGRAPHY | Facility: HOSPITAL | Age: 78
Discharge: HOME | End: 2023-12-06
Payer: MEDICARE

## 2023-12-06 ENCOUNTER — HOSPITAL ENCOUNTER (OUTPATIENT)
Dept: ULTRASOUND IMAGING | Facility: HOSPITAL | Age: 78
Discharge: HOME | End: 2023-12-06
Payer: MEDICARE

## 2023-12-06 DIAGNOSIS — N63.42 UNSPECIFIED LUMP IN LEFT BREAST, SUBAREOLAR: ICD-10-CM

## 2023-12-06 DIAGNOSIS — N63.0 BREAST MASS IN MALE: ICD-10-CM

## 2023-12-06 PROCEDURE — 77066 DX MAMMO INCL CAD BI: CPT

## 2023-12-06 PROCEDURE — G0279 TOMOSYNTHESIS, MAMMO: HCPCS

## 2023-12-06 PROCEDURE — 76642 ULTRASOUND BREAST LIMITED: CPT

## 2023-12-08 ENCOUNTER — TELEPHONE (OUTPATIENT)
Dept: FAMILY MEDICINE CLINIC | Facility: CLINIC | Age: 78
End: 2023-12-08
Payer: MEDICARE

## 2023-12-08 ENCOUNTER — PATIENT MESSAGE (OUTPATIENT)
Dept: FAMILY MEDICINE CLINIC | Facility: CLINIC | Age: 78
End: 2023-12-08
Payer: MEDICARE

## 2023-12-08 NOTE — TELEPHONE ENCOUNTER
If patient wants to trial off of montelukast, allergies or not dangerous so he is very free to try that and see if that helps with the tenderness.

## 2023-12-08 NOTE — TELEPHONE ENCOUNTER
----- Message from Huma Benitez MA sent at 12/8/2023 10:15 AM EST -----  Regarding: FW: test results  Contact: [phone]    ----- Message -----  From: Luis Emery  Sent: 12/8/2023   9:52 AM EST  To: Huma Benitez MA  Subject: test results                                     The radiologist said the tenderness could be hormonal and ask if I was taking any new drugs. The only new drug is the Montelukast that I have been taking for 3 months.  I am taking for allergies,  do I need to take it now since it’s cold and winter is almost here????  Thanks Luis

## 2024-01-12 DIAGNOSIS — E78.2 MIXED HYPERLIPIDEMIA: ICD-10-CM

## 2024-01-12 DIAGNOSIS — K21.9 GASTROESOPHAGEAL REFLUX DISEASE WITHOUT ESOPHAGITIS: ICD-10-CM

## 2024-01-12 DIAGNOSIS — I10 ESSENTIAL HYPERTENSION: ICD-10-CM

## 2024-01-12 RX ORDER — OMEPRAZOLE 20 MG/1
20 CAPSULE, DELAYED RELEASE ORAL DAILY
Qty: 90 CAPSULE | Refills: 3 | Status: SHIPPED | OUTPATIENT
Start: 2024-01-12

## 2024-01-12 RX ORDER — LOSARTAN POTASSIUM 100 MG/1
100 TABLET ORAL DAILY
Qty: 90 TABLET | Refills: 3 | Status: SHIPPED | OUTPATIENT
Start: 2024-01-12

## 2024-01-12 RX ORDER — ATORVASTATIN CALCIUM 20 MG/1
20 TABLET, FILM COATED ORAL DAILY
Qty: 90 TABLET | Refills: 3 | Status: SHIPPED | OUTPATIENT
Start: 2024-01-12

## 2024-02-03 NOTE — PROGRESS NOTES
Encounter Date:02/19/2024    Last seen in-8/14/2023      Patient ID: uLis MONGE Elder is a 78 y.o. male.      Chief Complaint:  Aortic valve stenosis  Status post TAVR  Bradycardia  Hypertension  Dyslipidemia  Dizziness     History of Present Illness  Occasional lightheadedness.  Since I have last seen, the patient has been without any chest discomfort ,shortness of breath, palpitations, dizziness or syncope.  Denies having any headache ,abdominal pain ,nausea, vomiting , diarrhea constipation, loss of weight or loss of appetite.  Denies having any excessive bruising ,hematuria or blood in the stool.    Review of all systems negative except as indicated.    Reviewed ROS.    Assessment and Plan      ]]]]]]]]]]]]]]]]]]  History  ==========  Status post TAVR 11/3/2021-  (29 mm Evolut Pro self-expanding transcatheter aortic valve [employer])     Echocardiogram 8/14/2023   Mild mitral regurgitation.  Mild tricuspid regurgitation.  Aortic (TAVR) valve is structurally and functionally normal.  Concentric left ventricle hypertrophy.  Left ventricular size and contractility is normal with ejection fraction of 60%.  Echocardiogram 4/25/2022 revealed normal left ventricular function with ejection fraction of 60%..  TAVR valve appears to be intact.  No evidence for aortic regurgitation is present.     30-day event monitor 4/7/2022  Basic rhythm is sinus.  Rate varied between /mt  No ectopy was noted.  No AV blocks or pauses were noted.     History of significant aortic valve stenosis with gradient of 66 (peak to peak) and 31 mean gradient and valve area of 1.2 cm²-dizziness and near syncopal feeling.  Echocardiogram 9/27/2021 revealed  Normal left ventricle function significant aortic valve stenosis with valve area of 0.4-0.5 cm².  Gradient 50/35 mmHg.     Cardiac catheterization 10/1/2021  No evidence for pulmonary hypertension is present.  Left ventricle angiogram was not performed due to inability to cross the  aortic valve.  Aortogram revealed normal-sized aorta with significantly reduced aortic valve motion.  No evidence for aortic regurgitation is present.  Left main coronary artery is normal.  Left anterior descending artery normal.  Circumflex coronary artery is normal.  Right coronary artery is normal (large and dominant)             Transesophageal echocardiogram 10/1/2021 revealed     Significant aortic valve stenosis with valve area of 0.8 cm².  Left ventricle is normal in size and contractility.  Ejection fraction 60%.  Mild mitral and aortic regurgitation is present.  No pericardial effusion or intracardiac thrombus is seen        -Sinus bradycardia     Chronic left bundle branch block-EKG 8/14/2023, 2/19/2024     -Hypertension and dyslipidemia     -History of prostate cancer.     -Status post cholecystectomy     - Allergic to nitroglycerin     -Non-smoker  =============  Plan  =========  Status post TAVR-11/3/2021  Echocardiogram-8/14/2023-as above.  Patient was educated regarding the results.     Dizziness- better  Occasional double vision-primary care.     Hypertension-elevated  160/73  Increase Norvasc to 10 mg a day.  Patient was given prescription for 10 mg tablet.     Sinus bradycardia.  Stable-asymptomatic   Continue losartan metoprolol.  Continue Norvasc 10 mg a day    Chronic left bundle branch block (new since 9/8/2022)  EKG 2/19/2024-sinus rhythm left bundle branch block      Dyslipidemia-continue atorvastatin.     Follow-up in the office in  6 months.     Further plan will depend on patient's progress.     Reviewed and updated-2/19/2024.  [[[[[[[[[[[[[[[             Diagnosis Plan   1. S/p TAVR (transcatheter aortic valve replacement), bioprosthetic        2. LBBB (left bundle branch block)        3. Chronic diastolic congestive heart failure        4. Mixed hyperlipidemia        5. S/P TAVR (transcatheter aortic valve replacement)        6. Near syncope        7. Aortic stenosis, severe        8.  Essential hypertension        9. Postoperative hematoma involving circulatory system following other circulatory system procedure        LAB RESULTS (LAST 7 DAYS)    CBC        BMP        CMP         BNP        TROPONIN        CoAg        Creatinine Clearance  CrCl cannot be calculated (Patient's most recent lab result is older than the maximum 30 days allowed.).    ABG        Radiology  No radiology results for the last day                The following portions of the patient's history were reviewed and updated as appropriate: allergies, current medications, past family history, past medical history, past social history, past surgical history, and problem list.    Review of Systems   Constitutional: Negative for malaise/fatigue.   Cardiovascular:  Negative for chest pain, dyspnea on exertion, leg swelling and palpitations.   Respiratory:  Negative for cough and shortness of breath.    Gastrointestinal:  Negative for abdominal pain, nausea and vomiting.   Neurological:  Positive for light-headedness. Negative for dizziness, focal weakness, headaches and numbness.   All other systems reviewed and are negative.      Current Outpatient Medications:     albuterol sulfate  (90 Base) MCG/ACT inhaler, 2 puffs Every 4 (Four) Hours As Needed for Wheezing., Disp: , Rfl:     amLODIPine (NORVASC) 5 MG tablet, TAKE 1 TABLET BY MOUTH DAILY, Disp: 90 tablet, Rfl: 3    amoxicillin (AMOXIL) 500 MG tablet, TAKE 4 TABLETS BY MOUTH 1 HOUR PRIOR TO DENTAL APPOINTMENT, Disp: , Rfl:     aspirin 81 MG EC tablet, Take 1 tablet by mouth Daily., Disp: , Rfl:     atorvastatin (LIPITOR) 20 MG tablet, TAKE 1 TABLET BY MOUTH DAILY, Disp: 90 tablet, Rfl: 3    benzonatate (TESSALON) 100 MG capsule, Take 1 capsule by mouth 3 times a day., Disp: , Rfl:     doxycycline (ADOXA) 100 MG tablet, Take 1 tablet by mouth Every 12 (Twelve) Hours., Disp: , Rfl:     glucosamine-chondroitin 500-400 MG per tablet, Take 1 tablet by mouth Daily., Disp: ,  Rfl:     latanoprost (XALATAN) 0.005 % ophthalmic solution, , Disp: , Rfl:     Loratadine 10 MG capsule, Take 1 capsule by mouth Daily. (Patient not taking: Reported on 11/27/2023), Disp: , Rfl:     losartan (COZAAR) 100 MG tablet, TAKE 1 TABLET BY MOUTH DAILY, Disp: 90 tablet, Rfl: 3    methylcellulose, Laxative, (CITRUCEL) 500 MG tablet tablet, Take 1 tablet by mouth 2 (two) times a day., Disp: , Rfl:     metoprolol succinate XL (TOPROL-XL) 50 MG 24 hr tablet, TAKE 1 TABLET BY MOUTH DAILY, Disp: 90 tablet, Rfl: 3    montelukast (SINGULAIR) 10 MG tablet, TAKE 1 TABLET BY MOUTH EVERY NIGHT (Patient not taking: Reported on 11/27/2023), Disp: 90 tablet, Rfl: 3    Multiple Vitamin (MULTIVITAMINS PO), Take 1 capsule by mouth Daily., Disp: , Rfl:     Omega-3 Fatty Acids (FISH OIL CONCENTRATE) 300 MG capsule, Take 1 capsule by mouth Daily., Disp: , Rfl:     omeprazole (priLOSEC) 20 MG capsule, TAKE 1 CAPSULE BY MOUTH DAILY, Disp: 90 capsule, Rfl: 3    Polyvinyl Alcohol-Povidone (REFRESH OP), Apply 1 drop to eye(s) as directed by provider 2 (Two) Times a Day., Disp: , Rfl:     promethazine (PHENERGAN) 25 MG tablet, Take 1 tablet by mouth., Disp: , Rfl:     triamcinolone (KENALOG) 0.1 % cream, APPLY CREAM EXTERNALLY TWICE DAILY AS NEEDED TO AFFECTED AREA DO NOT APPLY TO FACE OR FOLDS, Disp: , Rfl:     Allergies   Allergen Reactions    Nitroglycerin Unknown (See Comments)     chills       Family History   Problem Relation Age of Onset    Cancer Mother         was too far spread when found    Testicular cancer Father     Coronary artery disease Father     Hypertension Father     Hypertension Sister     Hypertension Other     Malig Hyperthermia Neg Hx        Past Surgical History:   Procedure Laterality Date    ANKLE TENDON REPAIR Left 07/05/2017    Dr. Armijo    AORTIC VALVE REPAIR/REPLACEMENT N/A 11/02/2021    Procedure: TTE TRANSFEMORAL TRANSCATHETER AORTIC VALVE REPLACEMENT with IntraOp TTE and possible open surgical  rescue.;  Surgeon: Preston Hager MD;  Location: [facility] OR 18/19;  Service: Cardiothoracic;  Laterality: N/A;    AORTIC VALVE REPAIR/REPLACEMENT N/A 11/02/2021    Procedure: Transfemoral Transcatheter Aortic Valve Replacement with IntraOp TTE and possible open surgical rescue.;  Surgeon: Manpreet Vargas MD;  Location: [facility] OR 18/19;  Service: Cardiovascular;  Laterality: N/A;    CARDIAC CATHETERIZATION  2005    no stents    CARDIAC CATHETERIZATION N/A 10/01/2021    Procedure: Right and Left Heart Cath with Coronar Angiography;  Surgeon: Luis Angel Frederick MD;  Location: [facility] CATH INVASIVE LOCATION;  Service: Cardiovascular;  Laterality: N/A;    CARDIAC CATHETERIZATION N/A 10/01/2021    Procedure: Aortic root aortogram;  Surgeon: Luis Angel Frederick MD;  Location: [facility] CATH INVASIVE LOCATION;  Service: Cardiovascular;  Laterality: N/A;    CHOLECYSTECTOMY  03/2010    [facility].    PROSTATECTOMY  01/1997    totally removed. [facility].       Past Medical History:   Diagnosis Date    Abdominal pain     Impression: s/p recent cholecystecomy clinically improved    Acute recurrent frontal sinusitis     Acute upper respiratory infection     Allergic ??    Anemia     Impression: stable, Hgb 12.1    Aortic valve replaced 11/2/2021    Cancer     prostate- removed surgically    Cholelithiasis 03/29/2010    Removed    Chronic angle-closure glaucoma     DNR (do not resuscitate)     Essential hypertension     Gastroesophageal reflux disease without esophagitis     Impression: Stable on current meds (alternating omeprazole and ranitidine). EGD 1/2017.    Heart murmur     History of measles     Hyperlipidemia     Medicare annual wellness visit, subsequent     Onychomycosis     Impression: Treatment options discussed. Prescription(s) as below. Medication(s) usage and side effects discussed.    Overweight     Personal history of malignant neoplasm of prostate     Screening for  depression     Negative Depression Screening (4 or less) ()    Screening PSA (prostate specific antigen)     Impression: With history of prostate cancer. New urinary symptoms. Recheck PSA.    Urinary frequency     Impression: Prescription(s) as below. Medication(s) usage and side effects discussed.       Family History   Problem Relation Age of Onset    Cancer Mother         was too far spread when found    Testicular cancer Father     Coronary artery disease Father     Hypertension Father     Hypertension Sister     Hypertension Other     Malig Hyperthermia Neg Hx        Social History     Socioeconomic History    Marital status:     Number of children: 4   Tobacco Use    Smoking status: Never     Passive exposure: Past    Smokeless tobacco: Never    Tobacco comments:     caffeine use   Vaping Use    Vaping Use: Never used   Substance and Sexual Activity    Alcohol use: No    Drug use: Never    Sexual activity: Not Currently     Partners: Female     Birth control/protection: None, Vasectomy     Comment: monogamous with wife           ECG 12 Lead    Date/Time: 2/19/2024 10:26 AM  Performed by: Luis Angel Frederick MD    Authorized by: Luis Angel Frederick MD  Comparison: compared with previous ECG   Similar to previous ECG  Comparison to previous ECG: Normal sinus rhythm left bundle branch block 63/min normal axis nonspecific ST changes 63/min no significant change from previous EKG.        Objective:       Physical Exam    There were no vitals taken for this visit.  The patient is alert, oriented and in no distress.    Vital signs as noted above.    Head and neck revealed no carotid bruits or jugular venous distension.  No thyromegaly or lymphadenopathy is present.    Lungs clear.  No wheezing.  Breath sounds are normal bilaterally.    Heart normal first and second heart sounds.  No murmur..  No pericardial rub is present.  No gallop is present.    Abdomen soft and nontender.  No organomegaly is  present.    Extremities revealed good peripheral pulses without any pedal edema.    Skin warm and dry.    Musculoskeletal system is grossly normal.    CNS grossly normal.    Reviewed and updated.

## 2024-02-19 ENCOUNTER — OFFICE VISIT (OUTPATIENT)
Dept: CARDIOLOGY | Facility: CLINIC | Age: 79
End: 2024-02-19
Payer: MEDICARE

## 2024-02-19 VITALS
OXYGEN SATURATION: 99 % | WEIGHT: 179 LBS | DIASTOLIC BLOOD PRESSURE: 73 MMHG | SYSTOLIC BLOOD PRESSURE: 160 MMHG | HEART RATE: 78 BPM | HEIGHT: 67 IN | BODY MASS INDEX: 28.09 KG/M2

## 2024-02-19 DIAGNOSIS — I35.0 AORTIC STENOSIS, SEVERE: ICD-10-CM

## 2024-02-19 DIAGNOSIS — I50.32 CHRONIC DIASTOLIC CONGESTIVE HEART FAILURE: ICD-10-CM

## 2024-02-19 DIAGNOSIS — R55 NEAR SYNCOPE: ICD-10-CM

## 2024-02-19 DIAGNOSIS — E78.2 MIXED HYPERLIPIDEMIA: ICD-10-CM

## 2024-02-19 DIAGNOSIS — I44.7 LBBB (LEFT BUNDLE BRANCH BLOCK): ICD-10-CM

## 2024-02-19 DIAGNOSIS — I97.638 POSTOPERATIVE HEMATOMA INVOLVING CIRCULATORY SYSTEM FOLLOWING OTHER CIRCULATORY SYSTEM PROCEDURE: ICD-10-CM

## 2024-02-19 DIAGNOSIS — Z95.2 S/P TAVR (TRANSCATHETER AORTIC VALVE REPLACEMENT): ICD-10-CM

## 2024-02-19 DIAGNOSIS — Z95.3 S/P TAVR (TRANSCATHETER AORTIC VALVE REPLACEMENT), BIOPROSTHETIC: Primary | ICD-10-CM

## 2024-02-19 DIAGNOSIS — I10 ESSENTIAL HYPERTENSION: ICD-10-CM

## 2024-02-19 PROCEDURE — 3078F DIAST BP <80 MM HG: CPT | Performed by: INTERNAL MEDICINE

## 2024-02-19 PROCEDURE — 93000 ELECTROCARDIOGRAM COMPLETE: CPT | Performed by: INTERNAL MEDICINE

## 2024-02-19 PROCEDURE — 1160F RVW MEDS BY RX/DR IN RCRD: CPT | Performed by: INTERNAL MEDICINE

## 2024-02-19 PROCEDURE — 1159F MED LIST DOCD IN RCRD: CPT | Performed by: INTERNAL MEDICINE

## 2024-02-19 PROCEDURE — 3077F SYST BP >= 140 MM HG: CPT | Performed by: INTERNAL MEDICINE

## 2024-02-19 PROCEDURE — 99214 OFFICE O/P EST MOD 30 MIN: CPT | Performed by: INTERNAL MEDICINE

## 2024-04-05 ENCOUNTER — HOSPITAL ENCOUNTER (OUTPATIENT)
Dept: CT IMAGING | Facility: HOSPITAL | Age: 79
Discharge: HOME | End: 2024-04-05
Payer: MEDICARE

## 2024-04-05 ENCOUNTER — OFFICE VISIT (OUTPATIENT)
Dept: FAMILY MEDICINE CLINIC | Facility: CLINIC | Age: 79
End: 2024-04-05
Payer: MEDICARE

## 2024-04-05 VITALS
WEIGHT: 180.6 LBS | HEART RATE: 94 BPM | TEMPERATURE: 98.2 F | DIASTOLIC BLOOD PRESSURE: 78 MMHG | RESPIRATION RATE: 18 BRPM | OXYGEN SATURATION: 98 % | BODY MASS INDEX: 27.37 KG/M2 | SYSTOLIC BLOOD PRESSURE: 130 MMHG | HEIGHT: 68 IN

## 2024-04-05 DIAGNOSIS — R42 EPISODE OF DIZZINESS: ICD-10-CM

## 2024-04-05 DIAGNOSIS — Z91.09 ENVIRONMENTAL ALLERGIES: ICD-10-CM

## 2024-04-05 DIAGNOSIS — R51.9 ACUTE NONINTRACTABLE HEADACHE, UNSPECIFIED HEADACHE TYPE: Primary | ICD-10-CM

## 2024-04-05 DIAGNOSIS — I10 ESSENTIAL HYPERTENSION: ICD-10-CM

## 2024-04-05 DIAGNOSIS — E66.3 OVERWEIGHT WITH BODY MASS INDEX (BMI) OF 27 TO 27.9 IN ADULT: ICD-10-CM

## 2024-04-05 DIAGNOSIS — R51.9 NONINTRACTABLE HEADACHE, UNSPECIFIED CHRONICITY PATTERN, UNSPECIFIED HEADACHE TYPE: ICD-10-CM

## 2024-04-05 PROCEDURE — 3075F SYST BP GE 130 - 139MM HG: CPT | Performed by: FAMILY MEDICINE

## 2024-04-05 PROCEDURE — 3078F DIAST BP <80 MM HG: CPT | Performed by: FAMILY MEDICINE

## 2024-04-05 PROCEDURE — 99214 OFFICE O/P EST MOD 30 MIN: CPT | Performed by: FAMILY MEDICINE

## 2024-04-05 PROCEDURE — 70450 CT HEAD/BRAIN W/O DYE: CPT

## 2024-04-05 NOTE — PROGRESS NOTES
"Chief Complaint  Headache    History of Present Illness  Luis Emery presents today, With his wife, for new concern of headache.     Headache: Patient complains of pressure type headache behind right ear. He does have a headache at this time. Feels like a golf ball inside his skull. Onset of symptoms were 1 week ago, it is worse in the mornings and associating symptoms include pain behind right ear. Treatments tried include acetaminophen.    Have noticed some dysequilebrium and unsteadiness especially with sudden turning especially with bowling.     History of ocular migraines with aura with jagged line in vision that will pass in few minutes. (Diagnosed by eye doctor) these migraines are not associated with headaches.    Sneeze all the time and a cough since last October.  Initially denied history of allergies and then remembers he is taking Claritin and montelukast for allergies.    Vitals:    04/05/24 1100 04/05/24 1103   BP: 150/74 130/78   BP Location: Right arm Right arm   Patient Position: Sitting Sitting   Cuff Size: Large Adult Large Adult   Pulse: 94    Resp: 18    Temp: 98.2 °F (36.8 °C)    TempSrc: Temporal    SpO2: 98%    Weight: 81.9 kg (180 lb 9.6 oz)    Height: 172.7 cm (68\")         Patient Care Team:  Yasmine Oconnor DO as PCP - General (Family Medicine)  Luis Angel Frederick MD as Consulting Physician (Cardiology)  Luz Bernstein MD (Dermatology)   Current Outpatient Medications on File Prior to Visit   Medication Sig    amLODIPine (NORVASC) 5 MG tablet TAKE 1 TABLET BY MOUTH DAILY (Patient taking differently: Take 2 tablets by mouth Daily.)    aspirin 81 MG EC tablet Take 1 tablet by mouth Daily.    atorvastatin (LIPITOR) 20 MG tablet TAKE 1 TABLET BY MOUTH DAILY    glucosamine-chondroitin 500-400 MG per tablet Take 1 tablet by mouth Daily.    latanoprost (XALATAN) 0.005 % ophthalmic solution     Loratadine 10 MG capsule Take 1 capsule by mouth Daily.    losartan (COZAAR) 100 MG tablet TAKE 1 " "TABLET BY MOUTH DAILY    methylcellulose, Laxative, (CITRUCEL) 500 MG tablet tablet Take 1 tablet by mouth 2 (two) times a day.    metoprolol succinate XL (TOPROL-XL) 50 MG 24 hr tablet TAKE 1 TABLET BY MOUTH DAILY    montelukast (SINGULAIR) 10 MG tablet TAKE 1 TABLET BY MOUTH EVERY NIGHT    Multiple Vitamin (MULTIVITAMINS PO) Take 1 capsule by mouth Daily.    omeprazole (priLOSEC) 20 MG capsule TAKE 1 CAPSULE BY MOUTH DAILY    Polyvinyl Alcohol-Povidone (REFRESH OP) Apply 1 drop to eye(s) as directed by provider 2 (Two) Times a Day.    promethazine (PHENERGAN) 25 MG tablet Take 1 tablet by mouth.    amoxicillin (AMOXIL) 500 MG tablet TAKE 4 TABLETS BY MOUTH 1 HOUR PRIOR TO DENTAL APPOINTMENT (Patient not taking: Reported on 4/5/2024)    benzonatate (TESSALON) 100 MG capsule Take 1 capsule by mouth 3 times a day. (Patient not taking: Reported on 4/5/2024)    doxycycline (ADOXA) 100 MG tablet Take 1 tablet by mouth Every 12 (Twelve) Hours. (Patient not taking: Reported on 4/5/2024)    Omega-3 Fatty Acids (FISH OIL CONCENTRATE) 300 MG capsule Take 1 capsule by mouth Daily. (Patient not taking: Reported on 4/5/2024)    triamcinolone (KENALOG) 0.1 % cream APPLY CREAM EXTERNALLY TWICE DAILY AS NEEDED TO AFFECTED AREA DO NOT APPLY TO FACE OR FOLDS (Patient not taking: Reported on 4/5/2024)     No current facility-administered medications on file prior to visit.       Objective   Vital Signs:   /78 (BP Location: Right arm, Patient Position: Sitting, Cuff Size: Large Adult)   Pulse 94   Temp 98.2 °F (36.8 °C) (Temporal)   Resp 18   Ht 172.7 cm (68\")   Wt 81.9 kg (180 lb 9.6 oz)   SpO2 98%   BMI 27.46 kg/m²    BP Readings from Last 3 Encounters:   04/05/24 130/78   02/19/24 160/73   11/27/23 110/60     Wt Readings from Last 3 Encounters:   04/05/24 81.9 kg (180 lb 9.6 oz)   02/19/24 81.2 kg (179 lb)   11/27/23 80.6 kg (177 lb 12.8 oz)         Physical Exam  Vitals and nursing note reviewed.   Constitutional:     "   General: He is not in acute distress.     Appearance: Normal appearance. He is well-developed.   HENT:      Head: Normocephalic and atraumatic.      Comments: There is some very mild tenderness to palpation of the right muscles at the base of the skull.  There is no pretragal tenderness.  Mild bogginess of nasal mucosa and of posterior pharynx cobblestoning     Right Ear: Tympanic membrane, ear canal and external ear normal. There is no impacted cerumen.      Left Ear: Tympanic membrane, ear canal and external ear normal. There is no impacted cerumen.      Nose: No rhinorrhea.   Eyes:      Conjunctiva/sclera: Conjunctivae normal.      Pupils: Pupils are equal, round, and reactive to light.   Neck:      Thyroid: No thyromegaly.      Vascular: No JVD.   Cardiovascular:      Rate and Rhythm: Normal rate and regular rhythm.      Heart sounds: Murmur heard.   Pulmonary:      Breath sounds: Normal breath sounds. No wheezing or rales.   Musculoskeletal:         General: Normal range of motion.      Cervical back: Normal range of motion and neck supple. No rigidity or tenderness.   Lymphadenopathy:      Cervical: No cervical adenopathy.   Skin:     General: Skin is warm and dry.      Findings: No rash.   Neurological:      Mental Status: He is alert and oriented to person, place, and time.   Psychiatric:         Mood and Affect: Mood normal.         Behavior: Behavior normal.            No visits with results within 1 Day(s) from this visit.   Latest known visit with results is:   Office Visit on 11/27/2023   Component Date Value Ref Range Status    WBC 11/30/2023 6.0  3.4 - 10.8 x10E3/uL Final    RBC 11/30/2023 4.45  4.14 - 5.80 x10E6/uL Final    Hemoglobin 11/30/2023 13.9  13.0 - 17.7 g/dL Final    Hematocrit 11/30/2023 41.4  37.5 - 51.0 % Final    MCV 11/30/2023 93  79 - 97 fL Final    MCH 11/30/2023 31.2  26.6 - 33.0 pg Final    MCHC 11/30/2023 33.6  31.5 - 35.7 g/dL Final    RDW 11/30/2023 12.7  11.6 - 15.4 % Final     Platelets 11/30/2023 213  150 - 450 x10E3/uL Final    Neutrophil Rel % 11/30/2023 53  Not Estab. % Final    Lymphocyte Rel % 11/30/2023 29  Not Estab. % Final    Monocyte Rel % 11/30/2023 11  Not Estab. % Final    Eosinophil Rel % 11/30/2023 6  Not Estab. % Final    Basophil Rel % 11/30/2023 1  Not Estab. % Final    Neutrophils Absolute 11/30/2023 3.2  1.4 - 7.0 x10E3/uL Final    Lymphocytes Absolute 11/30/2023 1.7  0.7 - 3.1 x10E3/uL Final    Monocytes Absolute 11/30/2023 0.7  0.1 - 0.9 x10E3/uL Final    Eosinophils Absolute 11/30/2023 0.4  0.0 - 0.4 x10E3/uL Final    Basophils Absolute 11/30/2023 0.1  0.0 - 0.2 x10E3/uL Final    Immature Granulocyte Rel % 11/30/2023 0  Not Estab. % Final    Immature Grans Absolute 11/30/2023 0.0  0.0 - 0.1 x10E3/uL Final    Glucose 11/30/2023 102 (H)  70 - 99 mg/dL Final    BUN 11/30/2023 15  8 - 27 mg/dL Final    Creatinine 11/30/2023 1.22  0.76 - 1.27 mg/dL Final    EGFR Result 11/30/2023 60  >59 mL/min/1.73 Final    BUN/Creatinine Ratio 11/30/2023 12  10 - 24 Final    Sodium 11/30/2023 140  134 - 144 mmol/L Final    Potassium 11/30/2023 4.2  3.5 - 5.2 mmol/L Final    Chloride 11/30/2023 105  96 - 106 mmol/L Final    Total CO2 11/30/2023 22  20 - 29 mmol/L Final    Calcium 11/30/2023 9.5  8.6 - 10.2 mg/dL Final    Total Protein 11/30/2023 6.6  6.0 - 8.5 g/dL Final    Albumin 11/30/2023 4.1  3.8 - 4.8 g/dL Final    Globulin 11/30/2023 2.5  1.5 - 4.5 g/dL Final    A/G Ratio 11/30/2023 1.6  1.2 - 2.2 Final    Total Bilirubin 11/30/2023 1.2  0.0 - 1.2 mg/dL Final    Alkaline Phosphatase 11/30/2023 70  44 - 121 IU/L Final    AST (SGOT) 11/30/2023 19  0 - 40 IU/L Final    ALT (SGPT) 11/30/2023 15  0 - 44 IU/L Final    Total Cholesterol 11/30/2023 131  100 - 199 mg/dL Final    Triglycerides 11/30/2023 112  0 - 149 mg/dL Final    HDL Cholesterol 11/30/2023 43  >39 mg/dL Final    VLDL Cholesterol Lai 11/30/2023 20  5 - 40 mg/dL Final    LDL Chol Calc ([facility]) 11/30/2023 68  0 - 99 mg/dL  Final    Vitamin B-12 11/30/2023 410  232 - 1,245 pg/mL Final       Lab Results   Component Value Date    CHOL 106 09/29/2021    CHLPL 131 11/30/2023    TRIG 112 11/30/2023    HDL 43 11/30/2023    LDL 68 11/30/2023     Lab Results   Component Value Date    TSH 2.300 02/22/2022     Lab Results   Component Value Date    GLUCOSE 102 (H) 11/30/2023    BUN 15 11/30/2023    CREATININE 1.22 11/30/2023    EGFRIFNONA 63 02/22/2022    EGFRIFAFRI 73 02/22/2022    BCR 12 11/30/2023    K 4.2 11/30/2023    CO2 22 11/30/2023    CALCIUM 9.5 11/30/2023    PROTENTOTREF 6.6 11/30/2023    ALBUMIN 4.1 11/30/2023    LABIL2 1.6 11/30/2023    AST 19 11/30/2023    ALT 15 11/30/2023     Lab Results   Component Value Date    WBC 6.0 11/30/2023    HGB 13.9 11/30/2023    HCT 41.4 11/30/2023    MCV 93 11/30/2023     11/30/2023                      Assessment and Plan    Diagnoses and all orders for this visit:    1. Acute nonintractable headache, unspecified headache type (Primary)  -     CT Head Without Contrast    2. Overweight with body mass index (BMI) of 27 to 27.9 in adult    3. Essential hypertension    4. Environmental allergies    5. Nonintractable headache, unspecified chronicity pattern, unspecified headache type    6. Episode of dizziness  -     CT Head Without Contrast      New onset right occipital headache behind the right ear persistent over the past week.  Patient denies changes with atmospheric pressure with recent storms.  He does report history of ocular migraines.  With new pressure and a sensation of foreign body and dizziness ordering stat CTA to rule out stroke or tumor.  Differential does include eustachian tube dysfunction, benign positional vertigo, muscle strain, blood pressure related etc.  Advised CT is negative he should be checking his blood pressure and doing neck stretches.  Could refer to vestibular rehab.  Make sure he is treating his allergies continuing his Claritin and Singulair adding nasal  fluticasone and or Astelin.      Hypertension, blood pressure was elevated at presentation improved on recheck.  He is taking losartan 100 mg, metoprolol XL 50 mg daily.  Advised to do home blood pressure readings and if remaining above 140/90 should contact/schedule appointment with Dr. Oconnor.  There are no discontinued medications.      Follow Up     Return in about 1 week (around 4/12/2024) for Recheck headache and dizziness.    Patient was given instructions and counseling regarding his condition or for health maintenance advice. Please see specific information pulled into the AVS if appropriate.

## 2024-05-29 DIAGNOSIS — Z91.09 ENVIRONMENTAL ALLERGIES: ICD-10-CM

## 2024-05-30 RX ORDER — MONTELUKAST SODIUM 10 MG/1
10 TABLET ORAL NIGHTLY
Qty: 90 TABLET | Refills: 3 | Status: SHIPPED | OUTPATIENT
Start: 2024-05-30

## 2024-08-25 NOTE — PROGRESS NOTES
Encounter Date:08/26/2024    Last seen-2/19/2024      Patient ID: Luis MONGE Elder is a 79 y.o. male.        Chief Complaint:  Aortic valve stenosis  Status post TAVR  Bradycardia  Hypertension  Dyslipidemia  Dizziness     History of Present Illness  Since I have last seen, the patient has been without any chest discomfort ,shortness of breath, palpitations, dizziness or syncope.  Denies having any headache ,abdominal pain ,nausea, vomiting , diarrhea constipation, loss of weight or loss of appetite.  Denies having any excessive bruising ,hematuria or blood in the stool.    Review of all systems negative except as indicated.    Reviewed ROS.    Assessment and Plan      ]]]]]]]]]]]]]]]]]]  History  ==========  Status post TAVR 11/3/2021-  (29 mm Evolut Pro self-expanding transcatheter aortic valve [employer])     Echocardiogram 8/14/2023   Mild mitral regurgitation.  Mild tricuspid regurgitation.  Aortic (TAVR) valve is structurally and functionally normal.  Concentric left ventricle hypertrophy.  Left ventricular size and contractility is normal with ejection fraction of 60%.  Echocardiogram 4/25/2022 revealed normal left ventricular function with ejection fraction of 60%..  TAVR valve appears to be intact.  No evidence for aortic regurgitation is present.     30-day event monitor 4/7/2022  Basic rhythm is sinus.  Rate varied between /mt  No ectopy was noted.  No AV blocks or pauses were noted.     History of significant aortic valve stenosis with gradient of 66 (peak to peak) and 31 mean gradient and valve area of 1.2 cm²-dizziness and near syncopal feeling.  Echocardiogram 9/27/2021 revealed  Normal left ventricle function significant aortic valve stenosis with valve area of 0.4-0.5 cm².  Gradient 50/35 mmHg.     Cardiac catheterization 10/1/2021  No evidence for pulmonary hypertension is present.  Left ventricle angiogram was not performed due to inability to cross the aortic valve.  Aortogram revealed  normal-sized aorta with significantly reduced aortic valve motion.  No evidence for aortic regurgitation is present.  Left main coronary artery is normal.  Left anterior descending artery normal.  Circumflex coronary artery is normal.  Right coronary artery is normal (large and dominant)             Transesophageal echocardiogram 10/1/2021 revealed     Significant aortic valve stenosis with valve area of 0.8 cm².  Left ventricle is normal in size and contractility.  Ejection fraction 60%.  Mild mitral and aortic regurgitation is present.  No pericardial effusion or intracardiac thrombus is seen        -Sinus bradycardia     Chronic left bundle branch block-EKG 8/14/2023, 2/19/2024     -Hypertension and dyslipidemia     -History of prostate cancer.     -Status post cholecystectomy     - Allergic to nitroglycerin     -Non-smoker  =============  Plan  =========  Status post TAVR-11/3/2021  Echocardiogram-8/14/2023-as above.     Dizziness- better  Occasional double vision-primary care.     Hypertension-elevated  153/73  Continue Norvasc to 10 mg a day.  Maintain blood pressure log.    Sinus bradycardia.  Stable-asymptomatic   Continue losartan metoprolol.  Continue Norvasc 10 mg a day     Chronic left bundle branch block (new since 9/8/2022)  EKG 2/19/2024-sinus rhythm left bundle branch block.  EKG 8/26/2024-sinus rhythm left bundle branch block.      Dyslipidemia-continue atorvastatin.     Follow-up in the office in  6 months with an echocardiogram.     Further plan will depend on patient's progress.     Reviewed and updated 8/26/2024.  [[[[[[[[[[[[[[[        Diagnosis Plan   1. S/p TAVR (transcatheter aortic valve replacement), bioprosthetic        2. Mixed hyperlipidemia        3. Aortic stenosis, severe        4. LBBB (left bundle branch block)        5. S/P TAVR (transcatheter aortic valve replacement)        6. Essential hypertension        7. Chronic diastolic congestive heart failure        8. Near syncope         LAB RESULTS (LAST 7 DAYS)    CBC        BMP        CMP         BNP        TROPONIN        CoAg        Creatinine Clearance  CrCl cannot be calculated (Patient's most recent lab result is older than the maximum 30 days allowed.).    ABG        Radiology  No radiology results for the last day                The following portions of the patient's history were reviewed and updated as appropriate: allergies, current medications, past family history, past medical history, past social history, past surgical history, and problem list.    Review of Systems   Constitutional: Negative for malaise/fatigue.   Cardiovascular:  Negative for chest pain, leg swelling, palpitations and syncope.   Respiratory:  Negative for shortness of breath.    Skin:  Negative for rash.   Gastrointestinal:  Negative for nausea and vomiting.   Neurological:  Negative for dizziness, light-headedness and numbness.   All other systems reviewed and are negative.      Current Outpatient Medications:     amLODIPine (NORVASC) 5 MG tablet, TAKE 1 TABLET BY MOUTH DAILY (Patient taking differently: Take 2 tablets by mouth Daily.), Disp: 90 tablet, Rfl: 3    aspirin 81 MG EC tablet, Take 1 tablet by mouth Daily., Disp: , Rfl:     atorvastatin (LIPITOR) 20 MG tablet, TAKE 1 TABLET BY MOUTH DAILY, Disp: 90 tablet, Rfl: 3    glucosamine-chondroitin 500-400 MG per tablet, Take 1 tablet by mouth Daily., Disp: , Rfl:     latanoprost (XALATAN) 0.005 % ophthalmic solution, , Disp: , Rfl:     Loratadine 10 MG capsule, Take 1 capsule by mouth Daily., Disp: , Rfl:     losartan (COZAAR) 100 MG tablet, TAKE 1 TABLET BY MOUTH DAILY, Disp: 90 tablet, Rfl: 3    methylcellulose, Laxative, (CITRUCEL) 500 MG tablet tablet, Take 1 tablet by mouth 2 (two) times a day., Disp: , Rfl:     metoprolol succinate XL (TOPROL-XL) 50 MG 24 hr tablet, TAKE 1 TABLET BY MOUTH DAILY, Disp: 90 tablet, Rfl: 3    montelukast (SINGULAIR) 10 MG tablet, TAKE 1 TABLET BY MOUTH EVERY NIGHT, Disp: 90  tablet, Rfl: 3    Multiple Vitamin (MULTIVITAMINS PO), Take 1 capsule by mouth Daily., Disp: , Rfl:     omeprazole (priLOSEC) 20 MG capsule, TAKE 1 CAPSULE BY MOUTH DAILY, Disp: 90 capsule, Rfl: 3    Polyvinyl Alcohol-Povidone (REFRESH OP), Apply 1 drop to eye(s) as directed by provider 2 (Two) Times a Day., Disp: , Rfl:     promethazine (PHENERGAN) 25 MG tablet, Take 1 tablet by mouth., Disp: , Rfl:     benzonatate (TESSALON) 100 MG capsule, Take 1 capsule by mouth 3 times a day. (Patient not taking: Reported on 4/5/2024), Disp: , Rfl:     doxycycline (ADOXA) 100 MG tablet, Take 1 tablet by mouth Every 12 (Twelve) Hours. (Patient not taking: Reported on 4/5/2024), Disp: , Rfl:     Omega-3 Fatty Acids (FISH OIL CONCENTRATE) 300 MG capsule, Take 1 capsule by mouth Daily. (Patient not taking: Reported on 4/5/2024), Disp: , Rfl:     triamcinolone (KENALOG) 0.1 % cream, APPLY CREAM EXTERNALLY TWICE DAILY AS NEEDED TO AFFECTED AREA DO NOT APPLY TO FACE OR FOLDS (Patient not taking: Reported on 4/5/2024), Disp: , Rfl:     Allergies   Allergen Reactions    Nitroglycerin Unknown (See Comments)     chills       Family History   Problem Relation Age of Onset    Cancer Mother         was too far spread when found    Testicular cancer Father     Coronary artery disease Father     Hypertension Father     Hypertension Sister     Hypertension Other     Malig Hyperthermia Neg Hx        Past Surgical History:   Procedure Laterality Date    ANKLE TENDON REPAIR Left 07/05/2017    Dr. Armijo    AORTIC VALVE REPAIR/REPLACEMENT N/A 11/02/2021    Procedure: TTE TRANSFEMORAL TRANSCATHETER AORTIC VALVE REPLACEMENT with IntraOp TTE and possible open surgical rescue.;  Surgeon: Preston Hager MD;  Location: [facility] OR 18/19;  Service: Cardiothoracic;  Laterality: N/A;    AORTIC VALVE REPAIR/REPLACEMENT N/A 11/02/2021    Procedure: Transfemoral Transcatheter Aortic Valve Replacement with IntraOp TTE and possible open surgical  rescue.;  Surgeon: Manpreet Vargas MD;  Location: [facility] HYBRID OR 18/19;  Service: Cardiovascular;  Laterality: N/A;    AORTIC VALVE SURGERY      CARDIAC CATHETERIZATION  2005    no stents    CARDIAC CATHETERIZATION N/A 10/01/2021    Procedure: Right and Left Heart Cath with Coronar Angiography;  Surgeon: Luis Angel Frederick MD;  Location: [facility] CATH INVASIVE LOCATION;  Service: Cardiovascular;  Laterality: N/A;    CARDIAC CATHETERIZATION N/A 10/01/2021    Procedure: Aortic root aortogram;  Surgeon: Luis Angel Frederick MD;  Location: [facility] CATH INVASIVE LOCATION;  Service: Cardiovascular;  Laterality: N/A;    CHOLECYSTECTOMY  03/2010    [facility].    PROSTATECTOMY  01/1997    totally removed. [facility].       Past Medical History:   Diagnosis Date    Abdominal pain     Impression: s/p recent cholecystecomy clinically improved    Acute recurrent frontal sinusitis     Acute upper respiratory infection     Allergic ??    Anemia     Impression: stable, Hgb 12.1    Aortic valve replaced 11/2/2021    Cancer     prostate- removed surgically    Cholelithiasis 03/29/2010    Removed    Chronic angle-closure glaucoma     DNR (do not resuscitate)     Essential hypertension     Gastroesophageal reflux disease without esophagitis     Impression: Stable on current meds (alternating omeprazole and ranitidine). EGD 1/2017.    Heart murmur     History of measles     Hyperlipidemia     Medicare annual wellness visit, subsequent     Onychomycosis     Impression: Treatment options discussed. Prescription(s) as below. Medication(s) usage and side effects discussed.    Overweight     Personal history of malignant neoplasm of prostate     Screening for depression     Negative Depression Screening (4 or less) ()    Screening PSA (prostate specific antigen)     Impression: With history of prostate cancer. New urinary symptoms. Recheck PSA.    Urinary frequency     Impression: Prescription(s) as below.  "Medication(s) usage and side effects discussed.       Family History   Problem Relation Age of Onset    Cancer Mother         was too far spread when found    Testicular cancer Father     Coronary artery disease Father     Hypertension Father     Hypertension Sister     Hypertension Other     Malig Hyperthermia Neg Hx        Social History     Socioeconomic History    Marital status:     Number of children: 4   Tobacco Use    Smoking status: Never     Passive exposure: Past    Smokeless tobacco: Never    Tobacco comments:     caffeine use   Vaping Use    Vaping status: Never Used   Substance and Sexual Activity    Alcohol use: No    Drug use: Never    Sexual activity: Not Currently     Partners: Female     Birth control/protection: None, Vasectomy     Comment: monogamous with wife           ECG 12 Lead    Date/Time: 8/26/2024 10:47 AM  Performed by: Luis Angel Frederick MD    Authorized by: Luis Angel Frederick MD  Comparison: compared with previous ECG   Similar to previous ECG  Comparison to previous ECG: Normal sinus rhythm left bundle branch block 62/min normal axis no ectopy no significant change from previous EKG.        Objective:       Physical Exam    /73 (BP Location: Left arm, Patient Position: Sitting, Cuff Size: Adult)   Pulse 62   Ht 172.7 cm (68\")   Wt 80.3 kg (177 lb)   SpO2 98% Comment: ra  BMI 26.91 kg/m²   The patient is alert, oriented and in no distress.    Vital signs as noted above.    Head and neck revealed no carotid bruits or jugular venous distension.  No thyromegaly or lymphadenopathy is present.    Lungs clear.  No wheezing.  Breath sounds are normal bilaterally.    Heart normal first and second heart sounds.  Grade 1/6 systolic murmur..  No pericardial rub is present.  No gallop is present.    Abdomen soft and nontender.  No organomegaly is present.    Extremities revealed good peripheral pulses without any pedal edema.    Skin warm and dry.    Musculoskeletal system is " grossly normal.    CNS grossly normal.    Reviewed and updated.

## 2024-08-26 ENCOUNTER — OFFICE VISIT (OUTPATIENT)
Dept: CARDIOLOGY | Facility: CLINIC | Age: 79
End: 2024-08-26
Payer: MEDICARE

## 2024-08-26 VITALS
OXYGEN SATURATION: 98 % | HEIGHT: 68 IN | WEIGHT: 177 LBS | HEART RATE: 62 BPM | SYSTOLIC BLOOD PRESSURE: 153 MMHG | BODY MASS INDEX: 26.83 KG/M2 | DIASTOLIC BLOOD PRESSURE: 73 MMHG

## 2024-08-26 DIAGNOSIS — Z95.3 S/P TAVR (TRANSCATHETER AORTIC VALVE REPLACEMENT), BIOPROSTHETIC: Primary | ICD-10-CM

## 2024-08-26 DIAGNOSIS — R55 NEAR SYNCOPE: ICD-10-CM

## 2024-08-26 DIAGNOSIS — I50.32 CHRONIC DIASTOLIC CONGESTIVE HEART FAILURE: ICD-10-CM

## 2024-08-26 DIAGNOSIS — I35.0 AORTIC STENOSIS, SEVERE: ICD-10-CM

## 2024-08-26 DIAGNOSIS — I10 ESSENTIAL HYPERTENSION: ICD-10-CM

## 2024-08-26 DIAGNOSIS — E78.2 MIXED HYPERLIPIDEMIA: ICD-10-CM

## 2024-08-26 DIAGNOSIS — I44.7 LBBB (LEFT BUNDLE BRANCH BLOCK): ICD-10-CM

## 2024-08-26 DIAGNOSIS — Z95.2 S/P TAVR (TRANSCATHETER AORTIC VALVE REPLACEMENT): ICD-10-CM

## 2024-08-26 PROCEDURE — 3078F DIAST BP <80 MM HG: CPT | Performed by: INTERNAL MEDICINE

## 2024-08-26 PROCEDURE — 99214 OFFICE O/P EST MOD 30 MIN: CPT | Performed by: INTERNAL MEDICINE

## 2024-08-26 PROCEDURE — 1160F RVW MEDS BY RX/DR IN RCRD: CPT | Performed by: INTERNAL MEDICINE

## 2024-08-26 PROCEDURE — 93000 ELECTROCARDIOGRAM COMPLETE: CPT | Performed by: INTERNAL MEDICINE

## 2024-08-26 PROCEDURE — 1159F MED LIST DOCD IN RCRD: CPT | Performed by: INTERNAL MEDICINE

## 2024-08-26 PROCEDURE — 3077F SYST BP >= 140 MM HG: CPT | Performed by: INTERNAL MEDICINE

## 2024-09-09 ENCOUNTER — OFFICE VISIT (OUTPATIENT)
Dept: FAMILY MEDICINE CLINIC | Facility: CLINIC | Age: 79
End: 2024-09-09
Payer: MEDICARE

## 2024-09-09 ENCOUNTER — HOSPITAL ENCOUNTER (OUTPATIENT)
Dept: GENERAL RADIOLOGY | Facility: HOSPITAL | Age: 79
Discharge: HOME | End: 2024-09-09
Admitting: FAMILY MEDICINE
Payer: MEDICARE

## 2024-09-09 VITALS
TEMPERATURE: 97.3 F | RESPIRATION RATE: 20 BRPM | BODY MASS INDEX: 27.13 KG/M2 | OXYGEN SATURATION: 98 % | WEIGHT: 179 LBS | HEART RATE: 78 BPM | DIASTOLIC BLOOD PRESSURE: 80 MMHG | HEIGHT: 68 IN | SYSTOLIC BLOOD PRESSURE: 138 MMHG

## 2024-09-09 DIAGNOSIS — R05.1 ACUTE COUGH: Primary | ICD-10-CM

## 2024-09-09 PROCEDURE — 1111F DSCHRG MED/CURRENT MED MERGE: CPT | Performed by: FAMILY MEDICINE

## 2024-09-09 PROCEDURE — 1126F AMNT PAIN NOTED NONE PRSNT: CPT | Performed by: FAMILY MEDICINE

## 2024-09-09 PROCEDURE — 71046 X-RAY EXAM CHEST 2 VIEWS: CPT

## 2024-09-09 PROCEDURE — 3075F SYST BP GE 130 - 139MM HG: CPT | Performed by: FAMILY MEDICINE

## 2024-09-09 PROCEDURE — 99213 OFFICE O/P EST LOW 20 MIN: CPT | Performed by: FAMILY MEDICINE

## 2024-09-09 PROCEDURE — 3079F DIAST BP 80-89 MM HG: CPT | Performed by: FAMILY MEDICINE

## 2024-09-09 RX ORDER — AZITHROMYCIN 250 MG/1
TABLET, FILM COATED ORAL
Qty: 6 TABLET | Refills: 0 | Status: SHIPPED | OUTPATIENT
Start: 2024-09-09

## 2024-09-09 NOTE — PROGRESS NOTES
Subjective   Luis Emery is a 79 y.o. male.   Chief Complaint   Patient presents with    URI       History of Present Illness  Patient treated for covid 6 weeks ago; still has persistent cough and congestion  URI   This is a recurrent problem. The current episode started more than 1 month ago. The problem has been unchanged. There has been no fever. Associated symptoms include congestion and coughing. Pertinent negatives include no abdominal pain, chest pain, diarrhea, dysuria, ear pain, nausea, neck pain, rash, swollen glands, vomiting or wheezing. He has tried acetaminophen, decongestant and antihistamine for the symptoms. The treatment provided mild relief.        The following portions of the patient's history were reviewed and updated as appropriate: allergies, current medications, past family history, past medical history, past social history, past surgical history, and problem list.    Patient Active Problem List   Diagnosis    Personal history of prostate cancer    Onychomycosis    Increased frequency of urination    Hyperlipidemia    History of measles    Gastroesophageal reflux disease without esophagitis    Essential hypertension    Chronic angle-closure glaucoma    Anemia    Aortic stenosis, severe    Full code status    Bradycardia    S/P TAVR (transcatheter aortic valve replacement)    Pacemaker    CKD (chronic kidney disease) stage 2, GFR 60-89 ml/min    Leg swelling    Overweight with body mass index (BMI) of 28 to 28.9 in adult    Environmental allergies       Current Outpatient Medications on File Prior to Visit   Medication Sig Dispense Refill    amLODIPine (NORVASC) 5 MG tablet TAKE 1 TABLET BY MOUTH DAILY (Patient taking differently: Take 2 tablets by mouth Daily.) 90 tablet 3    aspirin 81 MG EC tablet Take 1 tablet by mouth Daily.      atorvastatin (LIPITOR) 20 MG tablet TAKE 1 TABLET BY MOUTH DAILY 90 tablet 3    glucosamine-chondroitin 500-400 MG per tablet Take 1 tablet by mouth Daily.       latanoprost (XALATAN) 0.005 % ophthalmic solution       Loratadine 10 MG capsule Take 1 capsule by mouth Daily.      losartan (COZAAR) 100 MG tablet TAKE 1 TABLET BY MOUTH DAILY 90 tablet 3    methylcellulose, Laxative, (CITRUCEL) 500 MG tablet tablet Take 1 tablet by mouth 2 (two) times a day.      metoprolol succinate XL (TOPROL-XL) 50 MG 24 hr tablet TAKE 1 TABLET BY MOUTH DAILY 90 tablet 3    montelukast (SINGULAIR) 10 MG tablet TAKE 1 TABLET BY MOUTH EVERY NIGHT 90 tablet 3    Multiple Vitamin (MULTIVITAMINS PO) Take 1 capsule by mouth Daily.      omeprazole (priLOSEC) 20 MG capsule TAKE 1 CAPSULE BY MOUTH DAILY 90 capsule 3    Polyvinyl Alcohol-Povidone (REFRESH OP) Apply 1 drop to eye(s) as directed by provider 2 (Two) Times a Day.      promethazine (PHENERGAN) 25 MG tablet Take 1 tablet by mouth.      Omega-3 Fatty Acids (FISH OIL CONCENTRATE) 300 MG capsule Take 1 capsule by mouth Daily. (Patient not taking: Reported on 4/5/2024)      triamcinolone (KENALOG) 0.1 % cream APPLY CREAM EXTERNALLY TWICE DAILY AS NEEDED TO AFFECTED AREA DO NOT APPLY TO FACE OR FOLDS (Patient not taking: Reported on 4/5/2024)      [DISCONTINUED] benzonatate (TESSALON) 100 MG capsule Take 1 capsule by mouth 3 times a day. (Patient not taking: Reported on 4/5/2024)      [DISCONTINUED] doxycycline (ADOXA) 100 MG tablet Take 1 tablet by mouth Every 12 (Twelve) Hours. (Patient not taking: Reported on 4/5/2024)       No current facility-administered medications on file prior to visit.     Current outpatient and discharge medications have been reconciled for the patient.  Reviewed by: Remi Clinton MD      Allergies   Allergen Reactions    Nitroglycerin Unknown (See Comments)     chills       Review of Systems   Constitutional:  Negative for activity change, appetite change, fatigue and fever.   HENT:  Positive for congestion. Negative for ear pain, swollen glands and voice change.    Eyes:  Negative for visual disturbance.  "  Respiratory:  Positive for cough. Negative for shortness of breath and wheezing.    Cardiovascular:  Negative for chest pain and leg swelling.   Gastrointestinal:  Negative for abdominal pain, blood in stool, constipation, diarrhea, nausea and vomiting.   Endocrine: Negative for polydipsia and polyuria.   Genitourinary:  Negative for dysuria, frequency and hematuria.   Musculoskeletal:  Negative for joint swelling, neck pain and neck stiffness.   Skin:  Negative for rash and wound.   Neurological:  Negative for weakness, numbness and headache.   Psychiatric/Behavioral:  Negative for suicidal ideas and depressed mood.      I have reviewed and confirmed the accuracy of the ROS as documented by the MA/LPN/RN Remi Clinton MD    Objective   Visit Vitals  /80 (BP Location: Right arm, Patient Position: Sitting, Cuff Size: Large Adult)   Pulse 78   Temp 97.3 °F (36.3 °C) (Temporal)   Resp 20   Ht 172.7 cm (67.99\")   Wt 81.2 kg (179 lb)   SpO2 98%   BMI 27.22 kg/m²      **  Physical Exam  Constitutional:       Appearance: He is well-developed.   HENT:      Head: Normocephalic and atraumatic.      Right Ear: External ear normal.      Left Ear: External ear normal.      Nose: Nose normal.   Eyes:      Pupils: Pupils are equal, round, and reactive to light.   Cardiovascular:      Rate and Rhythm: Normal rate and regular rhythm.      Heart sounds: Normal heart sounds.   Pulmonary:      Effort: Pulmonary effort is normal.      Breath sounds: Normal breath sounds.   Abdominal:      General: Bowel sounds are normal.      Palpations: Abdomen is soft.   Musculoskeletal:         General: Normal range of motion.      Cervical back: Normal range of motion and neck supple.   Skin:     General: Skin is warm and dry.   Neurological:      Mental Status: He is alert and oriented to person, place, and time.   Psychiatric:         Behavior: Behavior normal.         Thought Content: Thought content normal.         Judgment: " Judgment normal.       Derm Physical Exam  Ortho Exam   Neurologic Exam     Mental Status   Oriented to person, place, and time.     Cranial Nerves     CN III, IV, VI   Pupils are equal, round, and reactive to light.         Diagnoses and all orders for this visit:    1. Acute cough (Primary)  Comments:  likely post covid. Check for secondary pneumonia. Tx empirically  Orders:  -     XR Chest PA & Lateral  -     azithromycin (ZITHROMAX) 250 MG tablet; Take 2 tablets the first day, then 1 tablet daily for 4 days.  Dispense: 6 tablet; Refill: 0    Findings discussed. All questions answered.  Differential diagnosis discussed.   Medication and medication adverse effects discussed.  Drug education given and explained to patient. Patient verbalized understanding.   Samples of trelegy 100 mcg 1 puff qd x 1 month fiven  Follow-up in 2 weeks with PCP if not better.  Follow-up sooner for worsening symptoms or for any concerns.             Expected course, medications, and adverse effects discussed as appropriate.  Call or return if worsening or persistent symptoms.     This document is intended for medical professional use only.   Electronically signed by Remi Clinton MD on 09/09/2024. This content may not have been proofread.

## 2024-10-25 ENCOUNTER — HOSPITAL ENCOUNTER (OUTPATIENT)
Dept: CARDIOLOGY | Facility: HOSPITAL | Age: 79
Discharge: HOME | End: 2024-10-25
Payer: MEDICARE

## 2024-10-25 ENCOUNTER — OFFICE VISIT (OUTPATIENT)
Dept: FAMILY MEDICINE CLINIC | Facility: CLINIC | Age: 79
End: 2024-10-25
Payer: MEDICARE

## 2024-10-25 VITALS
DIASTOLIC BLOOD PRESSURE: 62 MMHG | TEMPERATURE: 97.1 F | OXYGEN SATURATION: 99 % | HEART RATE: 81 BPM | WEIGHT: 181 LBS | BODY MASS INDEX: 27.43 KG/M2 | HEIGHT: 68 IN | SYSTOLIC BLOOD PRESSURE: 142 MMHG | RESPIRATION RATE: 18 BRPM

## 2024-10-25 DIAGNOSIS — E66.3 OVERWEIGHT WITH BODY MASS INDEX (BMI) OF 27 TO 27.9 IN ADULT: ICD-10-CM

## 2024-10-25 DIAGNOSIS — U09.9 POST-COVID CHRONIC COUGH: Primary | ICD-10-CM

## 2024-10-25 DIAGNOSIS — N52.9 ERECTILE DYSFUNCTION, UNSPECIFIED ERECTILE DYSFUNCTION TYPE: ICD-10-CM

## 2024-10-25 DIAGNOSIS — R42 DIZZINESS: ICD-10-CM

## 2024-10-25 DIAGNOSIS — R05.3 POST-COVID CHRONIC COUGH: Primary | ICD-10-CM

## 2024-10-25 PROCEDURE — 99214 OFFICE O/P EST MOD 30 MIN: CPT | Performed by: STUDENT IN AN ORGANIZED HEALTH CARE EDUCATION/TRAINING PROGRAM

## 2024-10-25 PROCEDURE — 93246 EXT ECG>7D<15D RECORDING: CPT

## 2024-10-25 RX ORDER — ALBUTEROL SULFATE 90 UG/1
2 INHALANT RESPIRATORY (INHALATION) EVERY 4 HOURS PRN
Qty: 18 G | Refills: 1 | Status: SHIPPED | OUTPATIENT
Start: 2024-10-25

## 2024-10-25 RX ORDER — AMLODIPINE BESYLATE 10 MG/1
10 TABLET ORAL EVERY MORNING
COMMUNITY
Start: 2024-10-06

## 2024-10-25 RX ORDER — MICONAZOLE NITRATE 20 MG/G
CREAM TOPICAL
COMMUNITY
Start: 2024-10-01

## 2024-10-25 RX ORDER — TADALAFIL 20 MG/1
20 TABLET ORAL DAILY PRN
Qty: 10 TABLET | Refills: 0 | Status: SHIPPED | OUTPATIENT
Start: 2024-10-25

## 2024-10-25 NOTE — PATIENT INSTRUCTIONS
-Start generic Flonase daily  -If the 2 medications above are not helpful, try nasal saline rinses/Miranda pot in the mornings before using Flonase

## 2024-10-25 NOTE — PROGRESS NOTES
Subjective   Luis Emery is a 79 y.o. male.   Chief Complaint   Patient presents with    Cough    Dizziness     Light headed       History of Present Illness     Chronic cough  -Follow up from 09/09/2024: Pt was evaluated by Dr Remi Clinton 09/09/2024 (likely post covid).  Chest x-ray was ordered, prescribed Azithromycin 250 mg.  Pt has also been using sample Trelegy inhaler   -Per pt cough has improved.  Has been using the Trelegy inhaler every other day and has been working less well  -Patient reports that he developed his chronic cough since 8/20/2024 when he caught COVID      Lightheadedness  -Started 7/2024  -Occurs 3 to 5 times per month.  Episodes will last 30 seconds.    -Denies: vision going dark and denies, trigger being from going from a lower to higher position, palpitations, shortness of breath , chest discomfort  -When lightheadedness initially started, he had double vision and this resolved last month  -Episodes can occur when he is sitting or doing activity  -Can get migraines on occasion (was told this by his eye doctor)  -Takes Claritin and Singulair      Erectile dysfunction  - was on cialis 20mg prn in the past and worked  -Would like to have a few generic Cialis tablets to have on hand  -Developed headaches with sildenafil/Viagra        The following portions of the patient's history were reviewed and updated as appropriate: allergies, current medications, past family history, past medical history, past social history, past surgical history, and problem list.    Patient Active Problem List   Diagnosis    Personal history of prostate cancer    Onychomycosis    Increased frequency of urination    Hyperlipidemia    History of measles    Gastroesophageal reflux disease without esophagitis    Essential hypertension    Chronic angle-closure glaucoma    Anemia    Aortic stenosis, severe    Full code status    Bradycardia    S/P TAVR (transcatheter aortic valve replacement)    Pacemaker    CKD  (chronic kidney disease) stage 2, GFR 60-89 ml/min    Leg swelling    Overweight with body mass index (BMI) of 28 to 28.9 in adult    Environmental allergies       Current Outpatient Medications on File Prior to Visit   Medication Sig Dispense Refill    amLODIPine (NORVASC) 10 MG tablet Take 1 tablet by mouth Every Morning.      aspirin 81 MG EC tablet Take 1 tablet by mouth Daily.      atorvastatin (LIPITOR) 20 MG tablet TAKE 1 TABLET BY MOUTH DAILY 90 tablet 3    glucosamine-chondroitin 500-400 MG per tablet Take 1 tablet by mouth Daily.      latanoprost (XALATAN) 0.005 % ophthalmic solution       Loratadine 10 MG capsule Take 1 capsule by mouth Daily.      losartan (COZAAR) 100 MG tablet TAKE 1 TABLET BY MOUTH DAILY 90 tablet 3    methylcellulose, Laxative, (CITRUCEL) 500 MG tablet tablet Take 1 tablet by mouth 2 (two) times a day.      metoprolol succinate XL (TOPROL-XL) 50 MG 24 hr tablet TAKE 1 TABLET BY MOUTH DAILY 90 tablet 3    miconazole (MICOTIN) 2 % cream APPLY 1 APPLICATION TOPICALLY TO THE AFFECTED AREA TWICE DAILY      montelukast (SINGULAIR) 10 MG tablet TAKE 1 TABLET BY MOUTH EVERY NIGHT 90 tablet 3    Multiple Vitamin (MULTIVITAMINS PO) Take 1 capsule by mouth Daily.      Omega-3 Fatty Acids (FISH OIL CONCENTRATE) 300 MG capsule Take 1 capsule by mouth Daily.      omeprazole (priLOSEC) 20 MG capsule TAKE 1 CAPSULE BY MOUTH DAILY 90 capsule 3    Polyvinyl Alcohol-Povidone (REFRESH OP) Apply 1 drop to eye(s) as directed by provider 2 (Two) Times a Day.      promethazine (PHENERGAN) 25 MG tablet Take 1 tablet by mouth. (Patient not taking: Reported on 10/25/2024)      triamcinolone (KENALOG) 0.1 % cream APPLY CREAM EXTERNALLY TWICE DAILY AS NEEDED TO AFFECTED AREA DO NOT APPLY TO FACE OR FOLDS (Patient not taking: Reported on 10/25/2024)      [DISCONTINUED] amLODIPine (NORVASC) 5 MG tablet TAKE 1 TABLET BY MOUTH DAILY (Patient taking differently: Take 2 tablets by mouth Daily.) 90 tablet 3     "[DISCONTINUED] azithromycin (ZITHROMAX) 250 MG tablet Take 2 tablets the first day, then 1 tablet daily for 4 days. 6 tablet 0     No current facility-administered medications on file prior to visit.     Current outpatient and discharge medications have been reconciled for the patient.  Reviewed by: Yasmine Oconnor DO      Allergies   Allergen Reactions    Nitroglycerin Unknown (See Comments)     chills         Objective   Visit Vitals  /62 (BP Location: Right arm, Patient Position: Sitting, Cuff Size: Adult)   Pulse 81   Temp 97.1 °F (36.2 °C) (Skin)   Resp 18   Ht 172.7 cm (67.99\")   Wt 82.1 kg (181 lb)   SpO2 99%   BMI 27.53 kg/m²       Physical Exam  HENT:      Head: Normocephalic and atraumatic.   Eyes:      Conjunctiva/sclera: Conjunctivae normal.   Cardiovascular:      Rate and Rhythm: Normal rate and regular rhythm.      Heart sounds: Murmur heard.      Comments: - 3/6 systolic crescendo-decrescendo murmur heard loudest on  right sternal border  Pulmonary:      Effort: Pulmonary effort is normal. No respiratory distress.      Breath sounds: Normal breath sounds. No wheezing, rhonchi or rales.   Neurological:      General: No focal deficit present.      Mental Status: He is alert and oriented to person, place, and time.   Psychiatric:         Mood and Affect: Mood normal.         Behavior: Behavior normal.           Diagnoses and all orders for this visit:    1. Post-COVID chronic cough (Primary)  -Starting   albuterol sulfate  (90 Base) MCG/ACT inhaler; Inhale 2 puffs Every 4 (Four) Hours As Needed for Shortness of Air.  Dispense: 18 g; Refill: 1  -     Fluticasone-Umeclidin-Vilant (TRELEGY) 100-62.5-25 MCG/ACT inhaler; Inhale 1 puff Daily.  Dispense: 28 each; Refill: 1  -Discussed with patient that it might be difficult to get Trelegy without a diagnosis of COPD.  Patient aware we may have to consider other alternatives  -Will check PFT to see if we can find any lung disease patterns  -     " Complete PFT - Pre & Post Bronchodilator    2. Dizziness  -     Holter Monitor - 72 Hour Up To 15 Days: To be placed at [facility] in [facility] for 14 days to evaluate for arrhythmia  -Patient states that his episodes are so brief and can be so mild that he often will forget about them.  Will check for arrhythmias at this point but do lab work on his upcoming annual visit  -Patient sees cardiology and was ordered an echo on 8/26/2024.  They are aware and watching his aortic valve stenosis, patient also has bradycardia and is S/P TAVR  -Suggested trying to add Flonase to his regimen to see if possibly allergies might be aggravating headache and causing lightheadedness    3. Erectile dysfunction, unspecified erectile dysfunction type  -     tadalafil (Cialis) 20 MG tablet; Take 1 tablet by mouth Daily As Needed for Erectile Dysfunction.  Dispense: 10 tablet; Refill: 0    4. Overweight with body mass index (BMI) of 27 to 27.9 in adult  BMI is >= 25 and <30. (Overweight) The following options were offered after discussion;: exercise counseling/recommendations and nutrition counseling/recommendations         Follow Up  -12/2/2024 for annual wellness exam    Expected course, medications, and adverse effects discussed as appropriate.  Call or return if worsening or persistent symptoms. Hand hygiene was performed before donning protective equipment and after removal when leaving the room.    This document is intended for medical expert use only. Reading of this document by patients and/or patient's family without participating medical staff guidance may result in misinterpretation and unintended morbidity. Any interpretation of such data is the responsibility of the patient and/or family member responsible for the patient in concert with their primary or specialist providers, not to be left for sources of online searches such as Phrazit, Entrecard or similar queries. Relying on these approaches to knowledge may result in  misinterpretation, misguided goals of care and even death should patients or family members try recommendations outside of the realm of professional medical care.

## 2024-11-11 ENCOUNTER — HOSPITAL ENCOUNTER (OUTPATIENT)
Dept: RESPIRATORY THERAPY | Facility: HOSPITAL | Age: 79
Discharge: HOME | End: 2024-11-11
Admitting: STUDENT IN AN ORGANIZED HEALTH CARE EDUCATION/TRAINING PROGRAM
Payer: MEDICARE

## 2024-11-11 VITALS — HEART RATE: 73 BPM | RESPIRATION RATE: 16 BRPM | OXYGEN SATURATION: 96 %

## 2024-11-11 PROCEDURE — 94060 EVALUATION OF WHEEZING: CPT

## 2024-11-11 PROCEDURE — 63710000001 ALBUTEROL SULFATE HFA 108 (90 BASE) MCG/ACT AEROSOL SOLUTION 6.7 G INHALER: Performed by: STUDENT IN AN ORGANIZED HEALTH CARE EDUCATION/TRAINING PROGRAM

## 2024-11-11 PROCEDURE — 94799 UNLISTED PULMONARY SVC/PX: CPT

## 2024-11-11 PROCEDURE — A9270 NON-COVERED ITEM OR SERVICE: HCPCS | Performed by: STUDENT IN AN ORGANIZED HEALTH CARE EDUCATION/TRAINING PROGRAM

## 2024-11-11 PROCEDURE — 94729 DIFFUSING CAPACITY: CPT

## 2024-11-11 PROCEDURE — 94664 DEMO&/EVAL PT USE INHALER: CPT

## 2024-11-11 PROCEDURE — 94727 GAS DIL/WSHOT DETER LNG VOL: CPT

## 2024-11-11 RX ORDER — ALBUTEROL SULFATE 90 UG/1
2 INHALANT RESPIRATORY (INHALATION) ONCE
Status: COMPLETED | OUTPATIENT
Start: 2024-11-11 | End: 2024-11-11

## 2024-11-11 RX ADMIN — ALBUTEROL SULFATE 2 PUFF: 108 AEROSOL, METERED RESPIRATORY (INHALATION) at 13:59

## 2024-11-12 DIAGNOSIS — I10 ESSENTIAL HYPERTENSION: ICD-10-CM

## 2024-11-13 RX ORDER — METOPROLOL SUCCINATE 50 MG/1
50 TABLET, EXTENDED RELEASE ORAL DAILY
Qty: 90 TABLET | Refills: 3 | Status: SHIPPED | OUTPATIENT
Start: 2024-11-13

## 2024-12-02 ENCOUNTER — OFFICE VISIT (OUTPATIENT)
Dept: FAMILY MEDICINE CLINIC | Facility: CLINIC | Age: 79
End: 2024-12-02
Payer: MEDICARE

## 2024-12-02 VITALS
DIASTOLIC BLOOD PRESSURE: 62 MMHG | SYSTOLIC BLOOD PRESSURE: 122 MMHG | TEMPERATURE: 97.1 F | RESPIRATION RATE: 18 BRPM | BODY MASS INDEX: 27.37 KG/M2 | HEART RATE: 91 BPM | WEIGHT: 180.6 LBS | OXYGEN SATURATION: 100 % | HEIGHT: 68 IN

## 2024-12-02 DIAGNOSIS — E66.3 OVERWEIGHT WITH BODY MASS INDEX (BMI) OF 27 TO 27.9 IN ADULT: ICD-10-CM

## 2024-12-02 DIAGNOSIS — R05.3 CHRONIC COUGH: ICD-10-CM

## 2024-12-02 DIAGNOSIS — R73.09 ELEVATED GLUCOSE: ICD-10-CM

## 2024-12-02 DIAGNOSIS — R42 DIZZINESS: ICD-10-CM

## 2024-12-02 DIAGNOSIS — E78.2 MIXED HYPERLIPIDEMIA: ICD-10-CM

## 2024-12-02 DIAGNOSIS — Z13.29 THYROID DISORDER SCREEN: ICD-10-CM

## 2024-12-02 DIAGNOSIS — Z00.00 MEDICARE ANNUAL WELLNESS VISIT, SUBSEQUENT: Primary | ICD-10-CM

## 2024-12-02 DIAGNOSIS — I47.10 SUPRAVENTRICULAR TACHYCARDIA: ICD-10-CM

## 2024-12-02 DIAGNOSIS — N52.9 ERECTILE DYSFUNCTION, UNSPECIFIED ERECTILE DYSFUNCTION TYPE: ICD-10-CM

## 2024-12-02 DIAGNOSIS — T88.7XXA DRUG SIDE EFFECTS: ICD-10-CM

## 2024-12-02 PROBLEM — D64.9 ANEMIA: Status: RESOLVED | Noted: 2019-09-12 | Resolved: 2024-12-02

## 2024-12-02 PROBLEM — I35.0 AORTIC STENOSIS, SEVERE: Status: RESOLVED | Noted: 2020-02-20 | Resolved: 2024-12-02

## 2024-12-02 PROBLEM — R00.1 BRADYCARDIA: Status: RESOLVED | Noted: 2021-09-28 | Resolved: 2024-12-02

## 2024-12-02 PROCEDURE — 3078F DIAST BP <80 MM HG: CPT | Performed by: STUDENT IN AN ORGANIZED HEALTH CARE EDUCATION/TRAINING PROGRAM

## 2024-12-02 PROCEDURE — 3074F SYST BP LT 130 MM HG: CPT | Performed by: STUDENT IN AN ORGANIZED HEALTH CARE EDUCATION/TRAINING PROGRAM

## 2024-12-02 PROCEDURE — 1126F AMNT PAIN NOTED NONE PRSNT: CPT | Performed by: STUDENT IN AN ORGANIZED HEALTH CARE EDUCATION/TRAINING PROGRAM

## 2024-12-02 PROCEDURE — 1111F DSCHRG MED/CURRENT MED MERGE: CPT | Performed by: STUDENT IN AN ORGANIZED HEALTH CARE EDUCATION/TRAINING PROGRAM

## 2024-12-02 PROCEDURE — 1170F FXNL STATUS ASSESSED: CPT | Performed by: STUDENT IN AN ORGANIZED HEALTH CARE EDUCATION/TRAINING PROGRAM

## 2024-12-02 PROCEDURE — G0439 PPPS, SUBSEQ VISIT: HCPCS | Performed by: STUDENT IN AN ORGANIZED HEALTH CARE EDUCATION/TRAINING PROGRAM

## 2024-12-02 PROCEDURE — 99214 OFFICE O/P EST MOD 30 MIN: CPT | Performed by: STUDENT IN AN ORGANIZED HEALTH CARE EDUCATION/TRAINING PROGRAM

## 2024-12-02 RX ORDER — METOPROLOL SUCCINATE 25 MG/1
TABLET, EXTENDED RELEASE ORAL
Qty: 30 TABLET | Refills: 1 | Status: SHIPPED | OUTPATIENT
Start: 2024-12-02

## 2024-12-02 NOTE — PROGRESS NOTES
Subjective   The ABCs of the Annual Wellness Visit  Medicare Wellness Visit      Luis Emery is a 79 y.o. patient who presents for a Medicare Wellness Visit.    The following portions of the patient's history were reviewed and   updated as appropriate: allergies, current medications, past family history, past medical history, past social history, past surgical history, and problem list.    Compared to one year ago, the patient's physical   health is slightly worse.  Compared to one year ago, the patient's mental   health is the same.        Recent Hospitalizations:  He was not admitted to the hospital during the last year.     Current Medical Providers:  Patient Care Team:  Yasmine Oconnor DO as PCP - General (Family Medicine)  Luis Angel Frederick MD as Consulting Physician (Cardiology)  Luz Bernstein MD (Dermatology)  Dr. Staci Lagos (Dental General Practice)  Dr. Puente (Optometry)    Outpatient Medications Prior to Visit   Medication Sig Dispense Refill    albuterol sulfate  (90 Base) MCG/ACT inhaler Inhale 2 puffs Every 4 (Four) Hours As Needed for Shortness of Air. 18 g 1    amLODIPine (NORVASC) 10 MG tablet Take 1 tablet by mouth Every Morning.      aspirin 81 MG EC tablet Take 1 tablet by mouth Daily.      atorvastatin (LIPITOR) 20 MG tablet TAKE 1 TABLET BY MOUTH DAILY 90 tablet 3    glucosamine-chondroitin 500-400 MG per tablet Take 1 tablet by mouth Daily.      latanoprost (XALATAN) 0.005 % ophthalmic solution       Loratadine 10 MG capsule Take 1 capsule by mouth Daily.      losartan (COZAAR) 100 MG tablet TAKE 1 TABLET BY MOUTH DAILY 90 tablet 3    methylcellulose, Laxative, (CITRUCEL) 500 MG tablet tablet Take 1 tablet by mouth 2 (two) times a day.      metoprolol succinate XL (TOPROL-XL) 50 MG 24 hr tablet TAKE 1 TABLET BY MOUTH DAILY 90 tablet 3    miconazole (MICOTIN) 2 % cream APPLY 1 APPLICATION TOPICALLY TO THE AFFECTED AREA TWICE DAILY      montelukast (SINGULAIR) 10 MG tablet TAKE 1  TABLET BY MOUTH EVERY NIGHT 90 tablet 3    Multiple Vitamin (MULTIVITAMINS PO) Take 1 capsule by mouth Daily.      Omega-3 Fatty Acids (FISH OIL CONCENTRATE) 300 MG capsule Take 1 capsule by mouth Daily.      omeprazole (priLOSEC) 20 MG capsule TAKE 1 CAPSULE BY MOUTH DAILY 90 capsule 3    Polyvinyl Alcohol-Povidone (REFRESH OP) Apply 1 drop to eye(s) as directed by provider 2 (Two) Times a Day.      Fluticasone-Umeclidin-Vilant (TRELEGY) 100-62.5-25 MCG/ACT inhaler Inhale 1 puff Daily. (Patient not taking: Reported on 12/2/2024) 28 each 1    promethazine (PHENERGAN) 25 MG tablet Take 1 tablet by mouth. (Patient not taking: Reported on 12/2/2024)      tadalafil (Cialis) 20 MG tablet Take 1 tablet by mouth Daily As Needed for Erectile Dysfunction. (Patient not taking: Reported on 12/2/2024) 10 tablet 0    triamcinolone (KENALOG) 0.1 % cream APPLY CREAM EXTERNALLY TWICE DAILY AS NEEDED TO AFFECTED AREA DO NOT APPLY TO FACE OR FOLDS (Patient not taking: Reported on 4/5/2024)       No facility-administered medications prior to visit.     No opioid medication identified on active medication list. I have reviewed chart for other potential  high risk medication/s and harmful drug interactions in the elderly.      Aspirin is on active medication list. Aspirin use is indicated based on review of current medical condition/s. Pros and cons of this therapy have been discussed today. Benefits of this medication outweigh potential harm.  Patient has been encouraged to continue taking this medication.  .      Patient Active Problem List   Diagnosis    Personal history of prostate cancer    Onychomycosis    Increased frequency of urination    Hyperlipidemia    History of measles    Gastroesophageal reflux disease without esophagitis    Essential hypertension    Chronic angle-closure glaucoma    Anemia    Aortic stenosis, severe    Full code status    Bradycardia    S/P TAVR (transcatheter aortic valve replacement)    Pacemaker     "CKD (chronic kidney disease) stage 2, GFR 60-89 ml/min    Leg swelling    Overweight with body mass index (BMI) of 28 to 28.9 in adult    Environmental allergies     Advance Care Planning Advance Directive is not on file.  ACP discussion was held with the patient during this visit. Patient has an advance directive (not in EMR), copy requested.            Objective   Vitals:    24 0816   BP: 122/62   BP Location: Left arm   Patient Position: Sitting   Cuff Size: Adult   Pulse: 91   Resp: 18   Temp: 97.1 °F (36.2 °C)   TempSrc: Skin   SpO2: 100%   Weight: 81.9 kg (180 lb 9.6 oz)   Height: 172.7 cm (67.99\")       Estimated body mass index is 27.47 kg/m² as calculated from the following:    Height as of this encounter: 172.7 cm (67.99\").    Weight as of this encounter: 81.9 kg (180 lb 9.6 oz).            Does the patient have evidence of cognitive impairment? No       -ACE 3 mini co/30                                                                                               Health  Risk Assessment    Smoking Status:  Social History     Tobacco Use   Smoking Status Never    Passive exposure: Past   Smokeless Tobacco Never   Tobacco Comments    caffeine use     Alcohol Consumption:  Social History     Substance and Sexual Activity   Alcohol Use No       Fall Risk Screen  STEADI Fall Risk Assessment was completed, and patient is at LOW risk for falls.Assessment completed on:2024    Depression Screening   Little interest or pleasure in doing things? Not at all   Feeling down, depressed, or hopeless? Not at all   PHQ-2 Total Score 0      Health Habits and Functional and Cognitive Screenin/2/2024     8:00 AM   Functional & Cognitive Status   Do you have difficulty preparing food and eating? No   Do you have difficulty bathing yourself, getting dressed or grooming yourself? No   Do you have difficulty using the toilet? No   Do you have difficulty moving around from place to place? No   Do you have " trouble with steps or getting out of a bed or a chair? No   Current Diet Well Balanced Diet   Dental Exam Up to date   Eye Exam Up to date   Exercise (times per week) 2 times per week   Current Exercises Include Cardiovascular Workout;Light Weights;Yard Work;House Cleaning;Gardening   Do you need help using the phone?  No   Are you deaf or do you have serious difficulty hearing?  Yes   Do you need help to go to places out of walking distance? No   Do you need help shopping? No   Do you need help preparing meals?  No   Do you need help with housework?  No   Do you need help with laundry? No   Do you need help taking your medications? No   Do you need help managing money? No   Do you ever drive or ride in a car without wearing a seat belt? No   Have you felt unusual stress, anger or loneliness in the last month? No   Who do you live with? Spouse   If you need help, do you have trouble finding someone available to you? No   Have you been bothered in the last four weeks by sexual problems? No   Do you have difficulty concentrating, remembering or making decisions? No           Age-appropriate Screening Schedule:  Refer to the list below for future screening recommendations based on patient's age, sex and/or medical conditions. Orders for these recommended tests are listed in the plan section. The patient has been provided with a written plan.    Health Maintenance List  Health Maintenance   Topic Date Due    COVID-19 Vaccine (10 - 2024-25 season) 09/01/2024    LIPID PANEL  11/30/2024    ANNUAL WELLNESS VISIT  12/02/2025    BMI FOLLOWUP  12/02/2025    TDAP/TD VACCINES (4 - Td or Tdap) 08/08/2034    RSV Vaccine - Adults  Completed    INFLUENZA VACCINE  Completed    Pneumococcal Vaccine 65+  Completed    ZOSTER VACCINE  Completed     Physical Exam  Constitutional:       General: He is not in acute distress.  HENT:      Head: Normocephalic and atraumatic.      Right Ear: Tympanic membrane normal.      Left Ear: Tympanic  membrane normal.      Nose: Nose normal.      Mouth/Throat:      Mouth: Mucous membranes are moist.      Pharynx: Oropharynx is clear. No posterior oropharyngeal erythema.   Eyes:      Extraocular Movements: Extraocular movements intact.      Conjunctiva/sclera: Conjunctivae normal.   Neck:      Comments: - Thyroid not enlarged  Cardiovascular:      Rate and Rhythm: Normal rate and regular rhythm.      Comments: - 3/6 crescendo murmur heard loudest on right sternal border  Pulmonary:      Effort: Pulmonary effort is normal.      Breath sounds: Normal breath sounds. No stridor. No wheezing.   Abdominal:      General: Abdomen is flat. Bowel sounds are normal.      Palpations: Abdomen is soft. There is no mass.      Tenderness: There is no abdominal tenderness. There is no guarding.   Musculoskeletal:         General: No swelling or deformity. Normal range of motion.      Cervical back: Normal range of motion and neck supple.   Skin:     General: Skin is warm and dry.      Capillary Refill: Capillary refill takes less than 2 seconds.      Coloration: Skin is not jaundiced.      Findings: No rash.   Neurological:      General: No focal deficit present.      Mental Status: He is alert and oriented to person, place, and time.      Cranial Nerves: No cranial nerve deficit.      Motor: No weakness.      Coordination: Coordination normal.      Gait: Gait normal.      Deep Tendon Reflexes: Reflexes normal.   Psychiatric:         Mood and Affect: Mood normal.         Behavior: Behavior normal.         Thought Content: Thought content normal.                                                                                                                                                  CMS Preventative Services Quick Reference  Risk Factors Identified During Encounter  Polypharmacy: Medication List reviewed    The above risks/problems have been discussed with the patient.  Pertinent information has been shared with the patient  "in the After Visit Summary.  An After Visit Summary and PPPS were made available to the patient.      1. Medicare annual wellness visit, subsequent (Primary)  -Patient will bring in a copy of power of  and living well  -Overall normal 79-year-old male exam  -Pertinent screening labs ordered per below    2. Supraventricular tachycardia/ Dizziness  -Discussed Holter monitor results with patient  - Patient is currently on metoprolol XL 50 mg daily.  His heart rate is 91 and blood pressure is controlled at 122/62  -Discussed with patient we could increase his metoprolol to 75 mg daily to see if he gets better control of his episodes of lightheadedness.  Patient was agreeable to try  -Adding metoprolol succinate XL (Toprol XL) 25 MG 24 hr tablet; Take 25mg with 50mg for total 75mg daily  Dispense: 30 tablet; Refill: 1  -Told patient should he feel unwell or very weak to let me know before he sees me next appointment as this can decrease blood pressure and heart rate    4. Chronic cough  -Discussed PFT results with patient.  Told him that if he wanted to further investigate potential causes for his restrictive pattern on his PFT that we could refer to pulmonology.  Patient considered but politely declined at this time  -He states that his chronic cough is more occasional and happens mainly in the morning in the evening when \"he slows down\"  -Currently taking Flonase, montelukast and over-the-counter antihistamine/claritin  -Patient plans to continue as he is for now    5. Erectile dysfunction, unspecified erectile dysfunction type  -Discussed with patient that if he gets side effects on Viagra and the Cialis is no longer helping him.  This is not an uncommon issue and the patient's age group.  Patient is due to see urology for surveillance of history of prostate cancer in January 2025 (reminded patient make an appoint with them).  Suggested he might bring this up to them to see if they may have other treatments " and what they might expect response wise for patient in his age group    6. Mixed hyperlipidemia  -     Lipid panel    7. Drug side effects  -     CBC & Differential  -     Comprehensive metabolic panel  -     Vitamin B12    8. Elevated glucose  -     Hemoglobin A1c    9. Thyroid disorder screen  -     TSH Rfx On Abnormal To Free T4    10. Overweight with body mass index (BMI) of 27 to 27.9 in adult  -Information about nutrition and standardized exercise recommendations added to patient's after visit summary        Follow-up  -1 year for annual wellness exam  -1 month for dizziness/BP check/SVT

## 2024-12-02 NOTE — PATIENT INSTRUCTIONS
Standardized Exercise Recommendations  - Work up to 150 minutes of aerobic, moderate intensity (you can talk but you can't sing) or 75 minutes of vigorous activity of dedicated exercise a week  - 2 days a week, include resistance/weight training    Light intensity   - Ex: casual walking, light housework, stretching  Moderate Intensity   - brisk walking, water aerobics, ballroom dancing   - breathing will be a little harder with a faster heartbeat  Vigorous Intensity   - jogging/running, aerobic dancing    What are the benefits of movement?  Moving your body has many benefits. It can:  ?Burn calories, which helps people control their weight  ?Help control blood sugar levels in people with diabetes  ?Lower blood pressure, especially in people with high blood pressure  ?Lower stress and help with depression and anxiety  ?Keep bones strong, so they don't get thin and break easily  ?Lower the chance of dying from heart disease  Adding even small amounts of physical activity to your daily routine can improve your health.    What are the main types of exercise?  There are 3 main types of exercise. They are:  ?Aerobic exercise - Aerobic exercise raises a person's heart rate. Examples of aerobic exercise are walking, running, dancing, riding a bike, or swimming.  ?Resistance training - Resistance training helps make your muscles stronger. People can do this type of exercise using weights, exercise bands, or weight machines. You can also do this type of exercise using your own body weight, as with push-ups, or by lifting items in your home, like jugs of water.   ?Stretching - Stretching exercises help your muscles and joints move more easily.  It's important to have all 3 types of exercise in your exercise program. That way, your body, muscles, and joints can be as healthy as possible.    Should I talk to my doctor or nurse before I start exercising?  If you have not exercised before or have not exercised in a long  time, talk with your doctor or nurse before you start a very active exercise program.  If you have heart disease or risk factors for heart disease (like high blood pressure or diabetes), your doctor or nurse might recommend that you have an exercise test before starting an exercise program.  When you start an exercise program, start slowly. For example, do the exercise at a slow pace or for a few minutes only. Over time, you can exercise faster and for longer periods of time.  What should I do when I exercise? -- Each time you exercise, you should:  ?Warm up - Warming up can help keep you from hurting your muscles when you exercise. To warm up, do a light aerobic exercise (such as walking slowly) or stretch for 5 to 10 minutes.  ?Work out - You should try to get a mix of aerobic exercise, resistance training, and stretching. During an aerobic workout, you can walk fast, swim, run, or use an exercise machine, for example. Other activities, like dancing or playing tennis, are also forms of aerobic exercise. You should also take time to stretch all of your joints, including your neck, shoulders, back, hips, and knees. At least 2 times a week, you can do resistance training exercises as part of your workout.  ?Cool down - Cooling down helps keep you from feeling dizzy after you exercise and helps prevent muscle cramps. To cool down, you can stretch or do a light aerobic exercise for 5 minutes.  Some people go to a gym or do group exercise classes. But you can exercise even without these things. Some exercises can be done even in a small space. You can also try online videos or smartphone apps to get ideas for different types of exercise.     How often should I exercise?   Doctors recommend that people exercise at least 30 minutes a day, on 5 or more days of the week.  If you can't exercise for 30 minutes straight, try to exercise for 10 minutes at a time, 3 or 4 times a day. Even exercising for shorter amounts of time  is good for you, especially if it means spending less time sitting.   When should I call my doctor or nurse? -- If you have any of the following symptoms when you exercise, stop exercising and call your doctor or nurse right away:  ?Pain or pressure in your chest, arms, throat, jaw, or back  ?Nausea or vomiting  ?Feeling like your heart is fluttering or racing very fast  ?Feeling dizzy or faint    What if I don't have time to exercise?   Many people have very busy lives and might not think that they have time to exercise. But it's important to try to find time to exercise, even if you are tired or work a lot. Exercise can increase your energy level, which can make you feel better and might even help you get more work done.  Even if it's hard to set aside a lot of time to exercise, you can still improve your health by moving your body more. There are many ways that you can be more active. For example, you can:  ?Take the stairs instead of the elevator  ?Park in a parking space that is farther away from the door  ?Take a longer route when you walk from one place to another  Spending a lot of time sitting still - for example, watching television or working on the computer - can be bad for your health. Try to get up and move around whenever you can. Even small amounts of movement, like taking short walks, doing household chores, or gardening, can help improve your health. Finding activities you enjoy, or doing them with other people, can help you add more movement into your daily life.    What else should I do when I exercise?   To exercise safely and avoid problems, it's important to:  ?Drink fluids during and after exercising (but avoid drinks with a lot of caffeine or sugar)  ?Avoid exercising outside if it is too hot or cold  ?Wear layers of clothes, so that you can take them off if you get too hot  ?Wear shoes that fit well and support your feet  ?Be aware of your surroundings if you exercise outside

## 2024-12-02 NOTE — PROGRESS NOTES
Subjective   Luis Emery is a 79 y.o. male.   Chief Complaint   Patient presents with    Medicare Wellness-subsequent       History of Present Illness     Lightheadedness  -Follow-up from 10/25/2024: Started 7/2024 and would occur 3-5 times per month  -Holter monitor came back showing left bundle branch block, 1 short episode of SVT (5 beats) and VT (5 beats) noted  -Patient states that the dizziness only lasts 15 to 30 seconds  -States after his Holter monitor, he is only had 1 episode  -Patient currently on metoprolol XL 50 mg daily      Chronic cough  -Follow-up from 10/25/2024: Patient developed a cough after being diagnosed with COVID on 8/20/2024.  Had also been using sample Trelegy inhaler given to him on a same-day visit  -Reviewed PFT results with patient  -Patient is currently taking Flonase, montelukast and an over-the-counter antihistamine      Erectile dysfunction  -Follow-up from 10/25/2024: Cialis worked well for patient and he got headaches on Viagra.  Restarted Cialis  -Patient states the Cialis 20 mg is not helping.        History of prostate cancer  -Patient initially stated that it had been 15 years since he last saw urology after his prostate removal  -On chart review, patient had been referred to urology last year and had seen urology on 1/10/2024.  They recommended to see him in a year where they would run a PSA    The following portions of the patient's history were reviewed and updated as appropriate: allergies, current medications, past family history, past medical history, past social history, past surgical history, and problem list.    Patient Active Problem List   Diagnosis    Personal history of prostate cancer    Onychomycosis    Increased frequency of urination    Hyperlipidemia    History of measles    Gastroesophageal reflux disease without esophagitis    Essential hypertension    Chronic angle-closure glaucoma    Full code status    S/P TAVR (transcatheter aortic valve  replacement)    CKD (chronic kidney disease) stage 2, GFR 60-89 ml/min    Leg swelling    Overweight with body mass index (BMI) of 28 to 28.9 in adult    Environmental allergies       Current Outpatient Medications on File Prior to Visit   Medication Sig Dispense Refill    albuterol sulfate  (90 Base) MCG/ACT inhaler Inhale 2 puffs Every 4 (Four) Hours As Needed for Shortness of Air. 18 g 1    amLODIPine (NORVASC) 10 MG tablet Take 1 tablet by mouth Every Morning.      aspirin 81 MG EC tablet Take 1 tablet by mouth Daily.      atorvastatin (LIPITOR) 20 MG tablet TAKE 1 TABLET BY MOUTH DAILY 90 tablet 3    glucosamine-chondroitin 500-400 MG per tablet Take 1 tablet by mouth Daily.      latanoprost (XALATAN) 0.005 % ophthalmic solution       Loratadine 10 MG capsule Take 1 capsule by mouth Daily.      losartan (COZAAR) 100 MG tablet TAKE 1 TABLET BY MOUTH DAILY 90 tablet 3    methylcellulose, Laxative, (CITRUCEL) 500 MG tablet tablet Take 1 tablet by mouth 2 (two) times a day.      metoprolol succinate XL (TOPROL-XL) 50 MG 24 hr tablet TAKE 1 TABLET BY MOUTH DAILY 90 tablet 3    miconazole (MICOTIN) 2 % cream APPLY 1 APPLICATION TOPICALLY TO THE AFFECTED AREA TWICE DAILY      montelukast (SINGULAIR) 10 MG tablet TAKE 1 TABLET BY MOUTH EVERY NIGHT 90 tablet 3    Multiple Vitamin (MULTIVITAMINS PO) Take 1 capsule by mouth Daily.      Omega-3 Fatty Acids (FISH OIL CONCENTRATE) 300 MG capsule Take 1 capsule by mouth Daily.      omeprazole (priLOSEC) 20 MG capsule TAKE 1 CAPSULE BY MOUTH DAILY 90 capsule 3    Polyvinyl Alcohol-Povidone (REFRESH OP) Apply 1 drop to eye(s) as directed by provider 2 (Two) Times a Day.      Fluticasone-Umeclidin-Vilant (TRELEGY) 100-62.5-25 MCG/ACT inhaler Inhale 1 puff Daily. (Patient not taking: Reported on 12/2/2024) 28 each 1    promethazine (PHENERGAN) 25 MG tablet Take 1 tablet by mouth. (Patient not taking: Reported on 12/2/2024)      tadalafil (Cialis) 20 MG tablet Take 1 tablet  "by mouth Daily As Needed for Erectile Dysfunction. (Patient not taking: Reported on 12/2/2024) 10 tablet 0    triamcinolone (KENALOG) 0.1 % cream APPLY CREAM EXTERNALLY TWICE DAILY AS NEEDED TO AFFECTED AREA DO NOT APPLY TO FACE OR FOLDS (Patient not taking: Reported on 4/5/2024)       No current facility-administered medications on file prior to visit.     Current outpatient and discharge medications have been reconciled for the patient.  Reviewed by: Yasmine Oconnor,       Allergies   Allergen Reactions    Nitroglycerin Unknown (See Comments)     chills         Objective   Visit Vitals  /62 (BP Location: Left arm, Patient Position: Sitting, Cuff Size: Adult)   Pulse 91   Temp 97.1 °F (36.2 °C) (Skin)   Resp 18   Ht 172.7 cm (67.99\")   Wt 81.9 kg (180 lb 9.6 oz)   SpO2 100%   BMI 27.47 kg/m²         Diagnoses and all orders for this visit:    2. Supraventricular tachycardia/ Dizziness  -Discussed Holter monitor results with patient  - Patient is currently on metoprolol XL 50 mg daily.  His heart rate is 91 and blood pressure is controlled at 122/62  -Discussed with patient we could increase his metoprolol to 75 mg daily to see if he gets better control of his episodes of lightheadedness.  Patient was agreeable to try  -Adding metoprolol succinate XL (Toprol XL) 25 MG 24 hr tablet; Take 25mg with 50mg for total 75mg daily  Dispense: 30 tablet; Refill: 1  -Told patient should he feel unwell or very weak to let me know before he sees me next appointment as this can decrease blood pressure and heart rate    4. Chronic cough  -Discussed PFT results with patient.  Told him that if he wanted to further investigate potential causes for his restrictive pattern on his PFT that we could refer to pulmonology.  Patient considered but politely declined at this time  -He states that his chronic cough is more occasional and happens mainly in the morning in the evening when \"he slows down\"  -Currently taking Flonase, " montelukast and over-the-counter antihistamine    5. Erectile dysfunction, unspecified erectile dysfunction type  -Discussed with patient that if he gets side effects on Viagra and the Cialis is no longer helping him.  This is not an uncommon issue and the patient's age group.  Patient is due to see urology for surveillance of history of prostate cancer in January 2025 (reminded patient make an appoint with them).  Suggested he might bring this up to them to see if they may have other treatments and what they might expect response wise for patient in his age group                 Expected course, medications, and adverse effects discussed as appropriate.  Call or return if worsening or persistent symptoms. Hand hygiene was performed before donning protective equipment and after removal when leaving the room.    This document is intended for medical expert use only. Reading of this document by patients and/or patient's family without participating medical staff guidance may result in misinterpretation and unintended morbidity. Any interpretation of such data is the responsibility of the patient and/or family member responsible for the patient in concert with their primary or specialist providers, not to be left for sources of online searches such as [employer], [employer] or similar queries. Relying on these approaches to knowledge may result in misinterpretation, misguided goals of care and even death should patients or family members try recommendations outside of the realm of professional medical care.

## 2024-12-03 RX ORDER — METOPROLOL SUCCINATE 25 MG/1
TABLET, EXTENDED RELEASE ORAL
Qty: 90 TABLET | OUTPATIENT
Start: 2024-12-03

## 2024-12-04 LAB
ALBUMIN SERPL-MCNC: 4.3 G/DL (ref 3.7–4.7)
ALP SERPL-CCNC: 77 IU/L (ref 44–121)
ALT SERPL-CCNC: 19 IU/L (ref 0–44)
AST SERPL-CCNC: 24 IU/L (ref 0–40)
BASOPHILS # BLD AUTO: 0 X10E3/UL (ref 0–0.2)
BASOPHILS NFR BLD AUTO: 1 %
BILIRUB SERPL-MCNC: 1.3 MG/DL (ref 0–1.2)
BUN SERPL-MCNC: 14 MG/DL (ref 8–27)
BUN/CREAT SERPL: 11 (ref 10–24)
CALCIUM SERPL-MCNC: 9.5 MG/DL (ref 8.6–10.2)
CHLORIDE SERPL-SCNC: 104 MMOL/L (ref 96–106)
CHOLEST SERPL-MCNC: 137 MG/DL (ref 100–199)
CO2 SERPL-SCNC: 23 MMOL/L (ref 20–29)
CREAT SERPL-MCNC: 1.22 MG/DL (ref 0.76–1.27)
EGFRCR SERPLBLD CKD-EPI 2021: 60 ML/MIN/1.73
EOSINOPHIL # BLD AUTO: 0.2 X10E3/UL (ref 0–0.4)
EOSINOPHIL NFR BLD AUTO: 3 %
ERYTHROCYTE [DISTWIDTH] IN BLOOD BY AUTOMATED COUNT: 12.5 % (ref 11.6–15.4)
GLOBULIN SER CALC-MCNC: 2.6 G/DL (ref 1.5–4.5)
GLUCOSE SERPL-MCNC: 106 MG/DL (ref 70–99)
HBA1C MFR BLD: 5.8 % (ref 4.8–5.6)
HCT VFR BLD AUTO: 42 % (ref 37.5–51)
HDLC SERPL-MCNC: 51 MG/DL
HGB BLD-MCNC: 14.2 G/DL (ref 13–17.7)
IMM GRANULOCYTES # BLD AUTO: 0 X10E3/UL (ref 0–0.1)
IMM GRANULOCYTES NFR BLD AUTO: 0 %
LDLC SERPL CALC-MCNC: 70 MG/DL (ref 0–99)
LYMPHOCYTES # BLD AUTO: 1.4 X10E3/UL (ref 0.7–3.1)
LYMPHOCYTES NFR BLD AUTO: 23 %
MCH RBC QN AUTO: 31.9 PG (ref 26.6–33)
MCHC RBC AUTO-ENTMCNC: 33.8 G/DL (ref 31.5–35.7)
MCV RBC AUTO: 94 FL (ref 79–97)
MONOCYTES # BLD AUTO: 0.7 X10E3/UL (ref 0.1–0.9)
MONOCYTES NFR BLD AUTO: 11 %
NEUTROPHILS # BLD AUTO: 3.7 X10E3/UL (ref 1.4–7)
NEUTROPHILS NFR BLD AUTO: 62 %
PLATELET # BLD AUTO: 214 X10E3/UL (ref 150–450)
POTASSIUM SERPL-SCNC: 4.4 MMOL/L (ref 3.5–5.2)
PROT SERPL-MCNC: 6.9 G/DL (ref 6–8.5)
RBC # BLD AUTO: 4.45 X10E6/UL (ref 4.14–5.8)
SODIUM SERPL-SCNC: 141 MMOL/L (ref 134–144)
TRIGL SERPL-MCNC: 83 MG/DL (ref 0–149)
TSH SERPL DL<=0.005 MIU/L-ACNC: 2.5 UIU/ML (ref 0.45–4.5)
VIT B12 SERPL-MCNC: 341 PG/ML (ref 232–1245)
VLDLC SERPL CALC-MCNC: 16 MG/DL (ref 5–40)
WBC # BLD AUTO: 6 X10E3/UL (ref 3.4–10.8)

## 2024-12-05 PROBLEM — R73.03 PREDIABETES: Status: ACTIVE | Noted: 2024-12-05

## 2025-01-02 DIAGNOSIS — K21.9 GASTROESOPHAGEAL REFLUX DISEASE WITHOUT ESOPHAGITIS: ICD-10-CM

## 2025-01-02 DIAGNOSIS — I10 ESSENTIAL HYPERTENSION: ICD-10-CM

## 2025-01-02 DIAGNOSIS — E78.2 MIXED HYPERLIPIDEMIA: ICD-10-CM

## 2025-01-02 RX ORDER — ATORVASTATIN CALCIUM 20 MG/1
20 TABLET, FILM COATED ORAL DAILY
Qty: 90 TABLET | Refills: 3 | Status: SHIPPED | OUTPATIENT
Start: 2025-01-02

## 2025-01-02 RX ORDER — LOSARTAN POTASSIUM 100 MG/1
100 TABLET ORAL DAILY
Qty: 90 TABLET | Refills: 3 | Status: SHIPPED | OUTPATIENT
Start: 2025-01-02

## 2025-01-13 ENCOUNTER — HOSPITAL ENCOUNTER (EMERGENCY)
Facility: HOSPITAL | Age: 80
Discharge: LEFT BEFORE DISCHARGE | End: 2025-01-13
Attending: EMERGENCY MEDICINE
Payer: MEDICARE

## 2025-01-13 PROCEDURE — 99211 OFF/OP EST MAY X REQ PHY/QHP: CPT | Performed by: EMERGENCY MEDICINE

## 2025-02-03 DIAGNOSIS — I47.10 SUPRAVENTRICULAR TACHYCARDIA: ICD-10-CM

## 2025-02-03 RX ORDER — METOPROLOL SUCCINATE 25 MG/1
TABLET, EXTENDED RELEASE ORAL
Qty: 30 TABLET | Refills: 1 | Status: SHIPPED | OUTPATIENT
Start: 2025-02-03

## 2025-02-03 NOTE — TELEPHONE ENCOUNTER
NANCY message:      I have been taking these with my 50 mg for 2 months now I have one more left.  I have an appointment Friday to see you and I should have seen you the day of the big snow but you canceled it.    Could you please give me a refill to last until I see you Friday?          Metoprolol succinate 25 mg

## 2025-02-17 NOTE — PROGRESS NOTES
Subjective   Luis Emery is a 79 y.o. male.   Chief Complaint   Patient presents with    Hypertension       History of Present Illness     Supraventricular tachycardia/ Dizziness  -Follow up from 12/02/2024: Discussed Holter monitor results with patient.  Patient was currently on metoprolol XL 50 mg daily.  His heart rate is 91 and blood pressure is controlled at 122/62.  Discussed with patient we could increase his metoprolol to 75 mg daily for better control.  Patient was agreeable to try.  Added Metoprolol succinate XL 25 mg  to his daily metoprolol succinate XL 50mg  Today  - BP today is: 152/78 at the start of appointment and 138/62 at the end of the appointment  -dizziness has subsided since increasing metoprolol except for 1 episode      Acute left knee pain  -Started last Thursday when he was bowling.  Landed on his left knee a few times and later jammed his knee on the floor in the restroom  -Has tried ice and heat without much improvement but today is slightly better      -Saw urology (D who told him only to come back if he is having issues as he does not have a prostate.           The following portions of the patient's history were reviewed and updated as appropriate: allergies, current medications, past family history, past medical history, past social history, past surgical history, and problem list.    Patient Active Problem List   Diagnosis    Personal history of prostate cancer    Onychomycosis    Increased frequency of urination    Hyperlipidemia    History of measles    Gastroesophageal reflux disease without esophagitis    Essential hypertension    Chronic angle-closure glaucoma    Full code status    S/P TAVR (transcatheter aortic valve replacement)    CKD (chronic kidney disease) stage 2, GFR 60-89 ml/min    Leg swelling    Overweight with body mass index (BMI) of 27 to 27.9 in adult    Environmental allergies    Prediabetes    Supraventricular tachycardia       Current Outpatient Medications  on File Prior to Visit   Medication Sig Dispense Refill    albuterol sulfate  (90 Base) MCG/ACT inhaler Inhale 2 puffs Every 4 (Four) Hours As Needed for Shortness of Air. 18 g 1    amLODIPine (NORVASC) 10 MG tablet Take 1 tablet by mouth Every Morning.      aspirin 81 MG EC tablet Take 1 tablet by mouth Daily.      atorvastatin (LIPITOR) 20 MG tablet TAKE 1 TABLET BY MOUTH EVERY DAY 90 tablet 3    Fluticasone-Umeclidin-Vilant (TRELEGY) 100-62.5-25 MCG/ACT inhaler Inhale 1 puff Daily. 28 each 1    glucosamine-chondroitin 500-400 MG per tablet Take 1 tablet by mouth Daily.      latanoprost (XALATAN) 0.005 % ophthalmic solution       Loratadine 10 MG capsule Take 1 capsule by mouth Daily.      losartan (COZAAR) 100 MG tablet TAKE 1 TABLET BY MOUTH EVERY DAY 90 tablet 3    methylcellulose, Laxative, (CITRUCEL) 500 MG tablet tablet Take 1 tablet by mouth 2 (two) times a day.      miconazole (MICOTIN) 2 % cream APPLY 1 APPLICATION TOPICALLY TO THE AFFECTED AREA TWICE DAILY      montelukast (SINGULAIR) 10 MG tablet TAKE 1 TABLET BY MOUTH EVERY NIGHT 90 tablet 3    Multiple Vitamin (MULTIVITAMINS PO) Take 1 capsule by mouth Daily.      Omega-3 Fatty Acids (FISH OIL CONCENTRATE) 300 MG capsule Take 1 capsule by mouth Daily.      omeprazole (priLOSEC) 20 MG capsule TAKE 1 CAPSULE BY MOUTH EVERY DAY 90 capsule 3    Polyvinyl Alcohol-Povidone (REFRESH OP) Apply 1 drop to eye(s) as directed by provider 2 (Two) Times a Day.      promethazine (PHENERGAN) 25 MG tablet Take 1 tablet by mouth.      tadalafil (Cialis) 20 MG tablet Take 1 tablet by mouth Daily As Needed for Erectile Dysfunction. 10 tablet 0    triamcinolone (KENALOG) 0.1 % cream       [DISCONTINUED] metoprolol succinate XL (Toprol XL) 25 MG 24 hr tablet Take 25mg with 50mg for total 75mg daily 30 tablet 1    [DISCONTINUED] metoprolol succinate XL (TOPROL-XL) 50 MG 24 hr tablet TAKE 1 TABLET BY MOUTH DAILY 90 tablet 3     No current facility-administered  "medications on file prior to visit.     Current outpatient and discharge medications have been reconciled for the patient.  Reviewed by: Yasmine Oconnor DO      Allergies   Allergen Reactions    Nitroglycerin Unknown (See Comments)     chills         Objective   Visit Vitals  /62 (BP Location: Right arm, Patient Position: Sitting, Cuff Size: Adult)   Pulse 87   Temp 97.1 °F (36.2 °C) (Temporal)   Resp 18   Ht 172.7 cm (67.99\")   Wt 82.1 kg (181 lb)   SpO2 99% Comment: ra   BMI 27.53 kg/m²       Physical Exam  HENT:      Head: Normocephalic and atraumatic.   Eyes:      Conjunctiva/sclera: Conjunctivae normal.   Neurological:      General: No focal deficit present.      Mental Status: He is alert and oriented to person, place, and time.   Psychiatric:         Mood and Affect: Mood normal.         Behavior: Behavior normal.           Diagnoses and all orders for this visit:    1. Supraventricular tachycardia (Primary)/ Essential hypertension  - Patient doing well/controlled on current regimen.  Will continue current regimen   - refilled  metoprolol succinate XL (Toprol XL) 25 MG 24 hr tablet; Take 25mg with 50mg for total 75mg daily  Dispense: 90 tablet; Refill: 3  -    refillled  metoprolol succinate XL (TOPROL-XL) 50 MG 24 hr tablet; Take 1 tablet by mouth Daily.  Dispense: 90 tablet; Refill: 3    3. Acute pain of left knee  -     XR Knee 3 View Left  -Recommended continued conservative measures.  Some suggestions added to after visit summary  -If after 1-2 more weeks and no improvement, asked patient to let us know and we will consider physical therapy.  Requested patient tell us which physical therapy facility he would like to go to    4. Overweight with body mass index (BMI) of 27 to 27.9 in adult  BMI is >= 25 and <30. (Overweight) The following options were offered after discussion;: exercise counseling/recommendations and nutrition counseling/recommendations         Follow Up  -12/4/2025 for annual " wellness exam    Expected course, medications, and adverse effects discussed as appropriate.  Call or return if worsening or persistent symptoms. Hand hygiene was performed before donning protective equipment and after removal when leaving the room.    This document is intended for medical expert use only. Reading of this document by patients and/or patient's family without participating medical staff guidance may result in misinterpretation and unintended morbidity. Any interpretation of such data is the responsibility of the patient and/or family member responsible for the patient in concert with their primary or specialist providers, not to be left for sources of online searches such as [employer], [employer] or similar queries. Relying on these approaches to knowledge may result in misinterpretation, misguided goals of care and even death should patients or family members try recommendations outside of the realm of professional medical care.

## 2025-02-18 ENCOUNTER — OFFICE VISIT (OUTPATIENT)
Dept: FAMILY MEDICINE CLINIC | Facility: CLINIC | Age: 80
End: 2025-02-18
Payer: MEDICARE

## 2025-02-18 ENCOUNTER — HOSPITAL ENCOUNTER (OUTPATIENT)
Dept: GENERAL RADIOLOGY | Facility: HOSPITAL | Age: 80
Discharge: HOME | End: 2025-02-18
Admitting: STUDENT IN AN ORGANIZED HEALTH CARE EDUCATION/TRAINING PROGRAM
Payer: MEDICARE

## 2025-02-18 VITALS
RESPIRATION RATE: 18 BRPM | OXYGEN SATURATION: 99 % | HEART RATE: 87 BPM | BODY MASS INDEX: 27.43 KG/M2 | SYSTOLIC BLOOD PRESSURE: 138 MMHG | TEMPERATURE: 97.1 F | WEIGHT: 181 LBS | DIASTOLIC BLOOD PRESSURE: 62 MMHG | HEIGHT: 68 IN

## 2025-02-18 DIAGNOSIS — I47.10 SUPRAVENTRICULAR TACHYCARDIA: Primary | ICD-10-CM

## 2025-02-18 DIAGNOSIS — M25.562 ACUTE PAIN OF LEFT KNEE: ICD-10-CM

## 2025-02-18 DIAGNOSIS — I10 ESSENTIAL HYPERTENSION: ICD-10-CM

## 2025-02-18 DIAGNOSIS — E66.3 OVERWEIGHT WITH BODY MASS INDEX (BMI) OF 27 TO 27.9 IN ADULT: ICD-10-CM

## 2025-02-18 PROCEDURE — 99213 OFFICE O/P EST LOW 20 MIN: CPT | Performed by: STUDENT IN AN ORGANIZED HEALTH CARE EDUCATION/TRAINING PROGRAM

## 2025-02-18 PROCEDURE — 73562 X-RAY EXAM OF KNEE 3: CPT

## 2025-02-18 RX ORDER — METOPROLOL SUCCINATE 25 MG/1
TABLET, EXTENDED RELEASE ORAL
Qty: 90 TABLET | Refills: 3 | Status: SHIPPED | OUTPATIENT
Start: 2025-02-18

## 2025-02-18 RX ORDER — METOPROLOL SUCCINATE 50 MG/1
50 TABLET, EXTENDED RELEASE ORAL DAILY
Qty: 90 TABLET | Refills: 3 | Status: SHIPPED | OUTPATIENT
Start: 2025-02-18

## 2025-02-18 NOTE — PATIENT INSTRUCTIONS
For Muscular or joint pains, use:  -  heat/ice, voltaren gel, icy/hot, biofreeze, lidocaine gel,   -  For muscular pain: TENS unit ($20-$30 off [employer]), bengay, gentle stretching

## 2025-02-26 NOTE — PROGRESS NOTES
Encounter Date:03/03/2025  Last seen 8/26/2024      Patient ID: Luis MONGE Elder is a 79 y.o. male.      Chief Complaint:  Aortic valve stenosis  Status post TAVR  Bradycardia  Hypertension  Dyslipidemia  Dizziness    History of present illness  Since I have last seen, the patient has been without any chest discomfort ,shortness of breath, palpitations, dizziness or syncope.  Denies having any headache ,abdominal pain ,nausea, vomiting , diarrhea constipation, loss of weight or loss of appetite.  Denies having any excessive bruising ,hematuria or blood in the stool.    Review of all systems negative except as indicated.    Reviewed ROS.    Assessment and Plan      ]]]]]]]]]]]]]]]]]]  History  ==========  Status post TAVR 11/3/2021-  (29 mm Evolut Pro self-expanding transcatheter aortic valve [employer])    Echocardiogram 3/3/2025   Mitral valve is structurally normal.  Mitral annular calcification is present.  Tricuspid valve is structurally normal.  TAVR valve is normal.  Gradient measured at 31/20 mmHg.  No evidence for pulmonary hypertension is present.  Left ventricular size and contractility is normal with ejection fraction of 60%.  Left ventricular peak systolic global longitudinal strain is -17%.  Concentric left ventricular hypertrophy is present.  Grade 1 left ventricular diastolic dysfunction is present.  Right ventricle is normal in size and TAPSE of 2 cm..    Echocardiogram 8/14/2023   Mild mitral regurgitation.  Mild tricuspid regurgitation.  Aortic (TAVR) valve is structurally and functionally normal.  Concentric left ventricle hypertrophy.  Left ventricular size and contractility is normal with ejection fraction of 60%.  Echocardiogram 4/25/2022 revealed normal left ventricular function with ejection fraction of 60%..  TAVR valve appears to be intact.  No evidence for aortic regurgitation is present.     30-day event monitor 4/7/2022  Basic rhythm is sinus.  Rate varied between /mt  No  ectopy was noted.  No AV blocks or pauses were noted.     History of significant aortic valve stenosis with gradient of 66 (peak to peak) and 31 mean gradient and valve area of 1.2 cm²-dizziness and near syncopal feeling.  Echocardiogram 9/27/2021 revealed  Normal left ventricle function significant aortic valve stenosis with valve area of 0.4-0.5 cm².  Gradient 50/35 mmHg.     Cardiac catheterization 10/1/2021  No evidence for pulmonary hypertension is present.  Left ventricle angiogram was not performed due to inability to cross the aortic valve.  Aortogram revealed normal-sized aorta with significantly reduced aortic valve motion.  No evidence for aortic regurgitation is present.  Left main coronary artery is normal.  Left anterior descending artery normal.  Circumflex coronary artery is normal.  Right coronary artery is normal (large and dominant)             Transesophageal echocardiogram 10/1/2021 revealed     Significant aortic valve stenosis with valve area of 0.8 cm².  Left ventricle is normal in size and contractility.  Ejection fraction 60%.  Mild mitral and aortic regurgitation is present.  No pericardial effusion or intracardiac thrombus is seen        -Sinus bradycardia     Chronic left bundle branch block-EKG 8/14/2023, 2/19/2024     -Hypertension and dyslipidemia     -History of prostate cancer.     -Status post cholecystectomy     - Allergic to nitroglycerin     -Non-smoker  =============  Plan  =========  Status post TAVR 11/3/2021.  Patient is not having any angina pectoris or congestive heart failure.    Echocardiogram 3/3/2025  Mitral valve is structurally normal.  Mitral annular calcification is present.  Tricuspid valve is structurally normal.  TAVR valve is normal.  Gradient measured at 31/20 mmHg.  No evidence for pulmonary hypertension is present.  Left ventricular size and contractility is normal with ejection fraction of 60%.  Left ventricular peak systolic global longitudinal strain is  -17%.  Concentric left ventricular hypertrophy is present.  Grade 1 left ventricular diastolic dysfunction is present.  Right ventricle is normal in size and TAPSE of 2 cm..    Have discussed and educated patient regarding the results of the testing.    Hypertension-154/69.  Maintain blood pressure log.  Continue Norvasc.    Dizziness-doing better.    Sinus bradycardia-stable and asymptomatic.  Patient is on losartan metoprolol and Norvasc.     Chronic left bundle branch block-asymptomatic.  EKG 2/19/2024-sinus rhythm left bundle branch block.  EKG 8/26/2024-sinus rhythm left bundle branch block.  EKG 3/3/2025-sinus rhythm left bundle branch block      Lipidemia-continue atorvastatin    Follow-up in the office in  6 months with an echocardiogram.     Further plan will depend on patient's progress.     Reviewed and updated 3/3/2025.  [[[[[[[[[[[[[[[           Diagnosis Plan   1. S/p TAVR (transcatheter aortic valve replacement), bioprosthetic        2. LBBB (left bundle branch block)        3. Chronic diastolic congestive heart failure        4. Mixed hyperlipidemia        5. S/P TAVR (transcatheter aortic valve replacement)        6. Near syncope        7. Aortic stenosis, severe        8. Essential hypertension        9. Postoperative hematoma involving circulatory system following other circulatory system procedure        LAB RESULTS (LAST 7 DAYS)    CBC        BMP        CMP         BNP        TROPONIN        CoAg        Creatinine Clearance  CrCl cannot be calculated (Patient's most recent lab result is older than the maximum 30 days allowed.).    ABG        Radiology  No radiology results for the last day                The following portions of the patient's history were reviewed and updated as appropriate: allergies, current medications, past family history, past medical history, past social history, past surgical history, and problem list.    Review of Systems   Constitutional: Negative for malaise/fatigue.    Cardiovascular:  Negative for chest pain, leg swelling, palpitations and syncope.   Respiratory:  Negative for shortness of breath.    Skin:  Negative for rash.   Gastrointestinal:  Negative for nausea and vomiting.   Neurological:  Negative for dizziness, light-headedness and numbness.   All other systems reviewed and are negative.      Current Outpatient Medications:     albuterol sulfate  (90 Base) MCG/ACT inhaler, Inhale 2 puffs Every 4 (Four) Hours As Needed for Shortness of Air., Disp: 18 g, Rfl: 1    amLODIPine (NORVASC) 10 MG tablet, Take 1 tablet by mouth Every Morning., Disp: , Rfl:     aspirin 81 MG EC tablet, Take 1 tablet by mouth Daily., Disp: , Rfl:     atorvastatin (LIPITOR) 20 MG tablet, TAKE 1 TABLET BY MOUTH EVERY DAY, Disp: 90 tablet, Rfl: 3    Fluticasone-Umeclidin-Vilant (TRELEGY) 100-62.5-25 MCG/ACT inhaler, Inhale 1 puff Daily., Disp: 28 each, Rfl: 1    glucosamine-chondroitin 500-400 MG per tablet, Take 1 tablet by mouth Daily., Disp: , Rfl:     latanoprost (XALATAN) 0.005 % ophthalmic solution, , Disp: , Rfl:     Loratadine 10 MG capsule, Take 1 capsule by mouth Daily., Disp: , Rfl:     losartan (COZAAR) 100 MG tablet, TAKE 1 TABLET BY MOUTH EVERY DAY, Disp: 90 tablet, Rfl: 3    methylcellulose, Laxative, (CITRUCEL) 500 MG tablet tablet, Take 1 tablet by mouth 2 (two) times a day., Disp: , Rfl:     metoprolol succinate XL (Toprol XL) 25 MG 24 hr tablet, Take 25mg with 50mg for total 75mg daily, Disp: 90 tablet, Rfl: 3    metoprolol succinate XL (TOPROL-XL) 50 MG 24 hr tablet, Take 1 tablet by mouth Daily., Disp: 90 tablet, Rfl: 3    miconazole (MICOTIN) 2 % cream, APPLY 1 APPLICATION TOPICALLY TO THE AFFECTED AREA TWICE DAILY, Disp: , Rfl:     montelukast (SINGULAIR) 10 MG tablet, TAKE 1 TABLET BY MOUTH EVERY NIGHT, Disp: 90 tablet, Rfl: 3    Multiple Vitamin (MULTIVITAMINS PO), Take 1 capsule by mouth Daily., Disp: , Rfl:     Omega-3 Fatty Acids (FISH OIL CONCENTRATE) 300 MG  capsule, Take 1 capsule by mouth Daily., Disp: , Rfl:     omeprazole (priLOSEC) 20 MG capsule, TAKE 1 CAPSULE BY MOUTH EVERY DAY, Disp: 90 capsule, Rfl: 3    Polyvinyl Alcohol-Povidone (REFRESH OP), Apply 1 drop to eye(s) as directed by provider 2 (Two) Times a Day., Disp: , Rfl:     promethazine (PHENERGAN) 25 MG tablet, Take 1 tablet by mouth., Disp: , Rfl:     tadalafil (Cialis) 20 MG tablet, Take 1 tablet by mouth Daily As Needed for Erectile Dysfunction., Disp: 10 tablet, Rfl: 0    triamcinolone (KENALOG) 0.1 % cream, , Disp: , Rfl:     Allergies   Allergen Reactions    Nitroglycerin Unknown (See Comments)     chills       Family History   Problem Relation Age of Onset    Cancer Mother         was too far spread when found    Testicular cancer Father     Coronary artery disease Father     Hypertension Father     Hypertension Sister     Hypertension Other     Malig Hyperthermia Neg Hx        Past Surgical History:   Procedure Laterality Date    ANKLE TENDON REPAIR Left 07/05/2017    Dr. Armijo    AORTIC VALVE REPAIR/REPLACEMENT N/A 11/02/2021    Procedure: TTE TRANSFEMORAL TRANSCATHETER AORTIC VALVE REPLACEMENT with IntraOp TTE and possible open surgical rescue.;  Surgeon: Preston Hager MD;  Location: [facility] OR 18/19;  Service: Cardiothoracic;  Laterality: N/A;    AORTIC VALVE REPAIR/REPLACEMENT N/A 11/02/2021    Procedure: Transfemoral Transcatheter Aortic Valve Replacement with IntraOp TTE and possible open surgical rescue.;  Surgeon: Manpreet Vargas MD;  Location: [facility] OR 18/19;  Service: Cardiovascular;  Laterality: N/A;    CARDIAC CATHETERIZATION  2005    no stents    CARDIAC CATHETERIZATION N/A 10/01/2021    Procedure: Right and Left Heart Cath with Coronar Angiography;  Surgeon: Luis Angel Frederick MD;  Location: [facility] CATH INVASIVE LOCATION;  Service: Cardiovascular;  Laterality: N/A;    CARDIAC CATHETERIZATION N/A 10/01/2021    Procedure: Aortic root aortogram;  Surgeon:  Luis Angel Frederick MD;  Location: [facility] INVASIVE LOCATION;  Service: Cardiovascular;  Laterality: N/A;    CHOLECYSTECTOMY  03/2010    [facility].    PROSTATECTOMY  01/1997    totally removed. [facility].       Past Medical History:   Diagnosis Date    Abdominal pain     Impression: s/p recent cholecystecomy clinically improved    Acute recurrent frontal sinusitis     Acute upper respiratory infection     Allergic ??    Anemia     Impression: stable, Hgb 12.1    Aortic stenosis, severe 02/20/2020    Aortic valve replaced 11/2/2021    Cancer     prostate- removed surgically    Cholelithiasis 03/29/2010    Removed    Chronic angle-closure glaucoma     DNR (do not resuscitate)     Essential hypertension     Gastroesophageal reflux disease without esophagitis     Impression: Stable on current meds (alternating omeprazole and ranitidine). EGD 1/2017.    Heart murmur     Heart valve disease     History of measles     Hyperlipidemia     Medicare annual wellness visit, subsequent     Onychomycosis     Impression: Treatment options discussed. Prescription(s) as below. Medication(s) usage and side effects discussed.    Overweight     Personal history of malignant neoplasm of prostate     Screening for depression     Negative Depression Screening (4 or less) ()    Screening PSA (prostate specific antigen)     Impression: With history of prostate cancer. New urinary symptoms. Recheck PSA.    Supraventricular tachycardia 02/18/2025    Urinary frequency     Impression: Prescription(s) as below. Medication(s) usage and side effects discussed.       Family History   Problem Relation Age of Onset    Cancer Mother         was too far spread when found    Testicular cancer Father     Coronary artery disease Father     Hypertension Father     Hypertension Sister     Hypertension Other     Malig Hyperthermia Neg Hx        Social History     Socioeconomic History    Marital status:     Number of  "children: 4   Tobacco Use    Smoking status: Never     Passive exposure: Past    Smokeless tobacco: Never   Vaping Use    Vaping status: Never Used   Substance and Sexual Activity    Alcohol use: No    Drug use: Never    Sexual activity: Not Currently     Partners: Female     Birth control/protection: None, Vasectomy     Comment: monogamous with wife           ECG 12 Lead    Date/Time: 3/3/2025 10:07 AM  Performed by: Luis Angel Frederick MD    Authorized by: Luis Angel Frederick MD  Comparison: compared with previous ECG   Similar to previous ECG  Comparison to previous ECG: Normal sinus rhythm left bundle branch block 66/min normal axis no ectopy no significant change from previous EKG.        Objective:       Physical Exam    /69 (BP Location: Right arm, Patient Position: Sitting, Cuff Size: Adult)   Pulse 66   Ht 172.7 cm (68\")   Wt 82.6 kg (182 lb)   SpO2 100%   BMI 27.67 kg/m²   The patient is alert, oriented and in no distress.    Vital signs as noted above.    Head and neck revealed no carotid bruits or jugular venous distension.  No thyromegaly or lymphadenopathy is present.    Lungs clear.  No wheezing.  Breath sounds are normal bilaterally.    Heart normal first and second heart sounds.  Grade 2/6 systolic murmur..  No pericardial rub is present.  No gallop is present.    Abdomen soft and nontender.  No organomegaly is present.    Extremities revealed good peripheral pulses without any pedal edema.    Skin warm and dry.    Musculoskeletal system is grossly normal.    CNS grossly normal.    Reviewed and updated.          "

## 2025-03-03 ENCOUNTER — HOSPITAL ENCOUNTER (OUTPATIENT)
Dept: CARDIOLOGY | Facility: HOSPITAL | Age: 80
Discharge: HOME | End: 2025-03-03
Admitting: INTERNAL MEDICINE
Payer: MEDICARE

## 2025-03-03 ENCOUNTER — OFFICE VISIT (OUTPATIENT)
Dept: CARDIOLOGY | Facility: CLINIC | Age: 80
End: 2025-03-03
Payer: MEDICARE

## 2025-03-03 VITALS
OXYGEN SATURATION: 100 % | HEIGHT: 68 IN | HEART RATE: 66 BPM | DIASTOLIC BLOOD PRESSURE: 69 MMHG | BODY MASS INDEX: 27.58 KG/M2 | WEIGHT: 182 LBS | SYSTOLIC BLOOD PRESSURE: 154 MMHG

## 2025-03-03 VITALS
BODY MASS INDEX: 27.43 KG/M2 | HEART RATE: 79 BPM | WEIGHT: 181 LBS | DIASTOLIC BLOOD PRESSURE: 72 MMHG | HEIGHT: 68 IN | SYSTOLIC BLOOD PRESSURE: 149 MMHG

## 2025-03-03 DIAGNOSIS — Z95.2 S/P TAVR (TRANSCATHETER AORTIC VALVE REPLACEMENT): ICD-10-CM

## 2025-03-03 DIAGNOSIS — I10 ESSENTIAL HYPERTENSION: ICD-10-CM

## 2025-03-03 DIAGNOSIS — I44.7 LBBB (LEFT BUNDLE BRANCH BLOCK): ICD-10-CM

## 2025-03-03 DIAGNOSIS — I35.0 AORTIC STENOSIS, SEVERE: ICD-10-CM

## 2025-03-03 DIAGNOSIS — Z95.3 S/P TAVR (TRANSCATHETER AORTIC VALVE REPLACEMENT), BIOPROSTHETIC: Primary | ICD-10-CM

## 2025-03-03 DIAGNOSIS — Z95.3 S/P TAVR (TRANSCATHETER AORTIC VALVE REPLACEMENT), BIOPROSTHETIC: ICD-10-CM

## 2025-03-03 DIAGNOSIS — I50.32 CHRONIC DIASTOLIC CONGESTIVE HEART FAILURE: ICD-10-CM

## 2025-03-03 DIAGNOSIS — E78.2 MIXED HYPERLIPIDEMIA: ICD-10-CM

## 2025-03-03 DIAGNOSIS — I97.638 POSTOPERATIVE HEMATOMA INVOLVING CIRCULATORY SYSTEM FOLLOWING OTHER CIRCULATORY SYSTEM PROCEDURE: ICD-10-CM

## 2025-03-03 DIAGNOSIS — R55 NEAR SYNCOPE: ICD-10-CM

## 2025-03-03 LAB
AV MEAN PRESS GRAD SYS DOP V1V2: 20.2 MMHG
AV VMAX SYS DOP: 282 CM/SEC
BH CV ECHO LEFT VENTRICLE GLOBAL LONGITUDINAL STRAIN: -16.6 %
BH CV ECHO MEAS - AO MAX PG: 31.9 MMHG
BH CV ECHO MEAS - AO ROOT DIAM: 2.8 CM
BH CV ECHO MEAS - AO V2 VTI: 60.8 CM
BH CV ECHO MEAS - AVA(I,D): 1.06 CM2
BH CV ECHO MEAS - EDV(CUBED): 60 ML
BH CV ECHO MEAS - EDV(MOD-SP2): 73 ML
BH CV ECHO MEAS - EDV(MOD-SP4): 67.6 ML
BH CV ECHO MEAS - EF(MOD-SP2): 64.8 %
BH CV ECHO MEAS - EF(MOD-SP4): 61.4 %
BH CV ECHO MEAS - ESV(CUBED): 16.9 ML
BH CV ECHO MEAS - ESV(MOD-SP2): 25.7 ML
BH CV ECHO MEAS - ESV(MOD-SP4): 26.1 ML
BH CV ECHO MEAS - FS: 34.5 %
BH CV ECHO MEAS - IVS/LVPW: 0.97 CM
BH CV ECHO MEAS - IVSD: 1.23 CM
BH CV ECHO MEAS - LA DIMENSION: 3.8 CM
BH CV ECHO MEAS - LAT PEAK E' VEL: 6.2 CM/SEC
BH CV ECHO MEAS - LV MASS(C)D: 170.4 GRAMS
BH CV ECHO MEAS - LV MAX PG: 5.2 MMHG
BH CV ECHO MEAS - LV MEAN PG: 3.2 MMHG
BH CV ECHO MEAS - LV V1 MAX: 113.9 CM/SEC
BH CV ECHO MEAS - LV V1 VTI: 23.1 CM
BH CV ECHO MEAS - LVIDD: 3.9 CM
BH CV ECHO MEAS - LVIDS: 2.6 CM
BH CV ECHO MEAS - LVOT AREA: 2.8 CM2
BH CV ECHO MEAS - LVOT DIAM: 1.88 CM
BH CV ECHO MEAS - LVPWD: 1.27 CM
BH CV ECHO MEAS - MED PEAK E' VEL: 5.1 CM/SEC
BH CV ECHO MEAS - MR MAX PG: 73.4 MMHG
BH CV ECHO MEAS - MR MAX VEL: 426.2 CM/SEC
BH CV ECHO MEAS - MR MEAN PG: 35.1 MMHG
BH CV ECHO MEAS - MR MEAN VEL: 266 CM/SEC
BH CV ECHO MEAS - MR VTI: 104 CM
BH CV ECHO MEAS - MV A MAX VEL: 123 CM/SEC
BH CV ECHO MEAS - MV DEC SLOPE: 402.5 CM/SEC2
BH CV ECHO MEAS - MV DEC TIME: 0.21 SEC
BH CV ECHO MEAS - MV E MAX VEL: 82.6 CM/SEC
BH CV ECHO MEAS - MV E/A: 0.67
BH CV ECHO MEAS - MV MAX PG: 5.7 MMHG
BH CV ECHO MEAS - MV MEAN PG: 2.7 MMHG
BH CV ECHO MEAS - MV V2 VTI: 35.2 CM
BH CV ECHO MEAS - MVA(VTI): 1.83 CM2
BH CV ECHO MEAS - PA ACC TIME: 0.12 SEC
BH CV ECHO MEAS - PA V2 MAX: 112 CM/SEC
BH CV ECHO MEAS - RAP SYSTOLE: 3 MMHG
BH CV ECHO MEAS - RV MAX PG: 4.1 MMHG
BH CV ECHO MEAS - RV V1 MAX: 101.8 CM/SEC
BH CV ECHO MEAS - RV V1 VTI: 21.2 CM
BH CV ECHO MEAS - RVDD: 4.1 CM
BH CV ECHO MEAS - RVSP: 32.1 MMHG
BH CV ECHO MEAS - SV(LVOT): 64.5 ML
BH CV ECHO MEAS - SV(MOD-SP2): 47.2 ML
BH CV ECHO MEAS - SV(MOD-SP4): 41.5 ML
BH CV ECHO MEAS - TAPSE (>1.6): 2.03 CM
BH CV ECHO MEAS - TR MAX PG: 29.1 MMHG
BH CV ECHO MEAS - TR MAX VEL: 269.6 CM/SEC
BH CV ECHO MEASUREMENTS AVERAGE E/E' RATIO: 14.62
LV EF BIPLANE MOD: 64 %

## 2025-03-03 PROCEDURE — 93306 TTE W/DOPPLER COMPLETE: CPT

## 2025-03-03 PROCEDURE — 1160F RVW MEDS BY RX/DR IN RCRD: CPT | Performed by: INTERNAL MEDICINE

## 2025-03-03 PROCEDURE — 93356 MYOCRD STRAIN IMG SPCKL TRCK: CPT | Performed by: INTERNAL MEDICINE

## 2025-03-03 PROCEDURE — 93000 ELECTROCARDIOGRAM COMPLETE: CPT | Performed by: INTERNAL MEDICINE

## 2025-03-03 PROCEDURE — 93356 MYOCRD STRAIN IMG SPCKL TRCK: CPT

## 2025-03-03 PROCEDURE — 3077F SYST BP >= 140 MM HG: CPT | Performed by: INTERNAL MEDICINE

## 2025-03-03 PROCEDURE — 1159F MED LIST DOCD IN RCRD: CPT | Performed by: INTERNAL MEDICINE

## 2025-03-03 PROCEDURE — 93306 TTE W/DOPPLER COMPLETE: CPT | Performed by: INTERNAL MEDICINE

## 2025-03-03 PROCEDURE — 3078F DIAST BP <80 MM HG: CPT | Performed by: INTERNAL MEDICINE

## 2025-03-03 PROCEDURE — 99214 OFFICE O/P EST MOD 30 MIN: CPT | Performed by: INTERNAL MEDICINE

## 2025-03-06 DIAGNOSIS — Z91.09 ENVIRONMENTAL ALLERGIES: ICD-10-CM

## 2025-03-07 RX ORDER — MONTELUKAST SODIUM 10 MG/1
10 TABLET ORAL NIGHTLY
Qty: 90 TABLET | Refills: 3 | Status: SHIPPED | OUTPATIENT
Start: 2025-03-07

## 2025-06-30 RX ORDER — AMLODIPINE BESYLATE 10 MG/1
10 TABLET ORAL EVERY MORNING
Qty: 90 TABLET | Refills: 3 | Status: SHIPPED | OUTPATIENT
Start: 2025-06-30

## 2025-06-30 NOTE — TELEPHONE ENCOUNTER
Rx Refill Note  Requested Prescriptions     Pending Prescriptions Disp Refills    amLODIPine (NORVASC) 10 MG tablet [Pharmacy Med Name: AMLODIPINE BESYLATE 10MG TABLETS] 90 tablet 3     Sig: TAKE 1 TABLET BY MOUTH EVERY MORNING      Last office visit with prescribing clinician: 3/3/2025   Last telemedicine visit with prescribing clinician: Visit date not found   Next office visit with prescribing clinician: 9/2/2025                         Would you like a call back once the refill request has been completed: [] Yes [] No    If the office needs to give you a call back, can they leave a voicemail: [] Yes [] No    Buffy Le MA  06/30/25, 08:41 EDT

## (undated) DEVICE — CATH ELECTRD PACE TEMP BI NONHEP 5F110CM

## (undated) DEVICE — SENSR CERBRL O2 PK/2

## (undated) DEVICE — SUT SILK 2 SUTUPAK TIE 60IN SA8H 2STRAND

## (undated) DEVICE — SOL NS 500ML

## (undated) DEVICE — CATH DIAG IMPULSE FL4 5F 100CM

## (undated) DEVICE — SOL IRR NACL 0.9PCT BT 1000ML

## (undated) DEVICE — DELIV SYS D-EVPROP2329US: Brand: EVOLUT™ PRO+

## (undated) DEVICE — TRY INTRO PERC 6F

## (undated) DEVICE — TAVR: Brand: MEDLINE INDUSTRIES, INC.

## (undated) DEVICE — CATH DIAG IMPULSE FR4 6F 100CM

## (undated) DEVICE — SOL IRR H2O BTL 1000ML STRL

## (undated) DEVICE — INTRO SHEATH ART/FEM ENGAGE .035 6F12CM

## (undated) DEVICE — INTRO SHEATH ART/FEM ENGAGE .035 8F12CM

## (undated) DEVICE — TBG INJ CONTRL PVCCLR RIGD RA 1200PSI 10

## (undated) DEVICE — STPCK 1WY STD/PRESS SWIVELNUT

## (undated) DEVICE — SPNG LAP 18X18IN LF STRL PK/5

## (undated) DEVICE — DISPOSABLE ADAPTER

## (undated) DEVICE — GW EMR FIX EXCHG J STD .035 3MM 260CM

## (undated) DEVICE — Device

## (undated) DEVICE — PK TRY HEART CATH 50

## (undated) DEVICE — TRANSD PRESS STOPCOCK1WAY CABL48IN

## (undated) DEVICE — CONTAINER,SPECIMEN,OR STERILE,4OZ: Brand: MEDLINE

## (undated) DEVICE — INTRO SHEATH ART/FEM ENGAGE .035 5F12CM

## (undated) DEVICE — DRSNG SURESITE WNDW 2.38X2.75

## (undated) DEVICE — 3M™ IOBAN™ 2 ANTIMICROBIAL INCISE DRAPE 6650EZ: Brand: IOBAN™ 2

## (undated) DEVICE — SUT GORE TT13 CV5 5N02A

## (undated) DEVICE — ST. SORBAVIEW ULTIMATE IJ SYSTEM A,C: Brand: CENTURION

## (undated) DEVICE — GLV SURG BIOGEL LTX PF 7 1/2

## (undated) DEVICE — GW JB PTFE .035IN 150CM STR

## (undated) DEVICE — DECANT BG O JET

## (undated) DEVICE — CATH DIAG IMPULSE PIG 5F 100CM

## (undated) DEVICE — TOWEL,OR,DSP,ST,BLUE,STD,4/PK,20PK/CS: Brand: MEDLINE

## (undated) DEVICE — LOADING SYS L-EVPROP2329US: Brand: EVOLUT™ PRO+

## (undated) DEVICE — TBG PRESS EXCITE INJ HPF1200 CLR 100IN

## (undated) DEVICE — DRAPE,REIN 53X77,STERILE: Brand: MEDLINE

## (undated) DEVICE — GLV SURG BIOGEL LTX PF 8

## (undated) DEVICE — CVR HNDL LT SURG ACCSSRY BLU STRL

## (undated) DEVICE — KT MANIFLD CARDIAC

## (undated) DEVICE — BIOPATCH™ ANTIMICROBIAL DRESSING WITH CHLORHEXIDINE GLUCONATE IS A HYDROPHILLIC POLYURETHANE ABSORPTIVE FOAM WITH CHLORHEXIDINE GLUCONATE (CHG) WHICH INHIBITS BACTERIAL GROWTH UNDER THE DRESSING. THE DRESSING IS INTENDED TO BE USED TO ABSORB EXUDATE, COVER A WOUND CAUSED BY VASCULAR AND NONVASCULAR PERCUTANEOUS MEDICAL DEVICES DURING SURGERY, AS WELL AS REDUCE LOCAL INFECTION AND COLONIZATION OF MICROORGANISMS.: Brand: BIOPATCH

## (undated) DEVICE — HI-TORQUE SUPRA CORE .035 PERIPHERAL GUIDE WIRE .035 X 300 CM: Brand: HI-TORQUE SUPRA CORE

## (undated) DEVICE — SOL ISO/ALC RUB 70PCT 4OZ

## (undated) DEVICE — CVR PROB 96IN LF STRL

## (undated) DEVICE — PINNACLE INTRODUCER SHEATH: Brand: PINNACLE

## (undated) DEVICE — CATH DIAG IMPULSE MPA2 SH 5F 100CM

## (undated) DEVICE — SWAN-GANZ TRUE SIZE THERMODILUTION "S" TIP: Brand: SWAN-GANZ TRUE SIZE

## (undated) DEVICE — CATH DIAG IMPULSE AL1 6F 100CM

## (undated) DEVICE — GW INQWIRE FC PTFE STR .035IN 150

## (undated) DEVICE — SPK CONTRST MISER

## (undated) DEVICE — ANGIO-SEAL VIP VASCULAR CLOSURE DEVICE: Brand: ANGIO-SEAL

## (undated) DEVICE — STPCK 3WY STD/PRESS SWIVELNUT

## (undated) DEVICE — CATH DIAG IMPULSE FR4 5F 100CM

## (undated) DEVICE — GW XCHG AMPLTZ XSTIF PTFE CRV .035 3X260

## (undated) DEVICE — SOL NACL 0.9PCT 1000ML

## (undated) DEVICE — PERCLOSE PROGLIDE™ SUTURE-MEDIATED CLOSURE SYSTEM: Brand: PERCLOSE PROGLIDE™

## (undated) DEVICE — 3M™ BAIR HUGGER® UNDERBODY BLANKET, FULL ACCESS, 10 PER CASE 63500: Brand: BAIR HUGGER™

## (undated) DEVICE — RADIFOCUS GLIDEWIRE: Brand: GLIDEWIRE

## (undated) DEVICE — ELECTRD DEFIB M/FUNC PROPADZ RADIOL 2PK

## (undated) DEVICE — SPNG GZ WOVN 4X4IN 12PLY 10/BX STRL

## (undated) DEVICE — HI-TORQUE SUPRA CORE .035 PERIPHERAL GUIDE WIRE .035 X 190 CM: Brand: HI-TORQUE SUPRA CORE

## (undated) DEVICE — FR5 INFINITI MULTIPAC: Brand: INFINITI

## (undated) DEVICE — TBG ART PRESS 24 IN

## (undated) DEVICE — CATH DIAG IMPULSE PIG145 6F 110CM